# Patient Record
Sex: FEMALE | Race: BLACK OR AFRICAN AMERICAN | NOT HISPANIC OR LATINO | Employment: OTHER | URBAN - METROPOLITAN AREA
[De-identification: names, ages, dates, MRNs, and addresses within clinical notes are randomized per-mention and may not be internally consistent; named-entity substitution may affect disease eponyms.]

---

## 2023-04-05 ENCOUNTER — OFFICE VISIT (OUTPATIENT)
Dept: PRIMARY CARE | Facility: CLINIC | Age: 73
End: 2023-04-05
Payer: MEDICARE

## 2023-04-05 VITALS
OXYGEN SATURATION: 98 % | DIASTOLIC BLOOD PRESSURE: 75 MMHG | SYSTOLIC BLOOD PRESSURE: 126 MMHG | TEMPERATURE: 96.3 F | BODY MASS INDEX: 28.42 KG/M2 | HEART RATE: 84 BPM | WEIGHT: 150.4 LBS

## 2023-04-05 DIAGNOSIS — E11.9 TYPE 2 DIABETES MELLITUS WITHOUT COMPLICATION, WITHOUT LONG-TERM CURRENT USE OF INSULIN (MULTI): Primary | ICD-10-CM

## 2023-04-05 DIAGNOSIS — I10 BENIGN ESSENTIAL HYPERTENSION: ICD-10-CM

## 2023-04-05 DIAGNOSIS — I25.10 CAD S/P PERCUTANEOUS CORONARY ANGIOPLASTY: ICD-10-CM

## 2023-04-05 DIAGNOSIS — M25.562 ACUTE PAIN OF LEFT KNEE: ICD-10-CM

## 2023-04-05 DIAGNOSIS — Z98.61 CAD S/P PERCUTANEOUS CORONARY ANGIOPLASTY: ICD-10-CM

## 2023-04-05 PROBLEM — R49.0 DYSPHONIA: Status: ACTIVE | Noted: 2023-04-05

## 2023-04-05 PROBLEM — M81.0 POSTMENOPAUSAL BONE LOSS: Status: ACTIVE | Noted: 2023-04-05

## 2023-04-05 PROBLEM — R20.2 NUMBNESS AND TINGLING OF BOTH FEET: Status: ACTIVE | Noted: 2023-04-05

## 2023-04-05 PROBLEM — N95.2 POSTMENOPAUSAL ATROPHIC VAGINITIS: Status: ACTIVE | Noted: 2023-04-05

## 2023-04-05 PROBLEM — Z86.19 HEPATITIS C VIRUS INFECTION CURED AFTER ANTIVIRAL DRUG THERAPY: Status: ACTIVE | Noted: 2023-04-05

## 2023-04-05 PROBLEM — I73.00 RAYNAUDS PHENOMENON: Status: ACTIVE | Noted: 2023-04-05

## 2023-04-05 PROBLEM — H43.811 PVD (POSTERIOR VITREOUS DETACHMENT), RIGHT EYE: Status: ACTIVE | Noted: 2023-04-05

## 2023-04-05 PROBLEM — D68.61 ANTI-PHOSPHOLIPID ANTIBODY SYNDROME (MULTI): Status: ACTIVE | Noted: 2023-04-05

## 2023-04-05 PROBLEM — E78.5 HLD (HYPERLIPIDEMIA): Status: ACTIVE | Noted: 2023-04-05

## 2023-04-05 PROBLEM — M54.2 CERVICALGIA: Status: ACTIVE | Noted: 2023-04-05

## 2023-04-05 PROBLEM — H04.129 DRY EYE: Status: ACTIVE | Noted: 2023-04-05

## 2023-04-05 PROBLEM — J30.9 ALLERGIC RHINITIS: Status: ACTIVE | Noted: 2023-04-05

## 2023-04-05 PROBLEM — K76.9 CHRONIC LIVER DISEASE: Status: ACTIVE | Noted: 2023-04-05

## 2023-04-05 PROBLEM — I70.0 ATHEROSCLEROSIS OF AORTA (CMS-HCC): Status: ACTIVE | Noted: 2023-04-05

## 2023-04-05 PROBLEM — H25.10 NUCLEAR SCLEROSIS: Status: ACTIVE | Noted: 2023-04-05

## 2023-04-05 PROBLEM — K21.9 GERD (GASTROESOPHAGEAL REFLUX DISEASE): Status: ACTIVE | Noted: 2023-04-05

## 2023-04-05 PROBLEM — H52.4 PRESBYOPIA OF BOTH EYES: Status: ACTIVE | Noted: 2023-04-05

## 2023-04-05 PROBLEM — H25.812 COMBINED FORM OF AGE-RELATED CATARACT, LEFT EYE: Status: ACTIVE | Noted: 2023-04-05

## 2023-04-05 PROBLEM — H01.006 BLEPHARITIS OF BOTH EYES: Status: ACTIVE | Noted: 2023-04-05

## 2023-04-05 PROBLEM — H52.13 BILATERAL MYOPIA: Status: ACTIVE | Noted: 2023-04-05

## 2023-04-05 PROBLEM — R20.0 NUMBNESS AND TINGLING OF BOTH FEET: Status: ACTIVE | Noted: 2023-04-05

## 2023-04-05 PROBLEM — M05.79 RHEUMATOID ARTHRITIS INVOLVING MULTIPLE SITES WITH POSITIVE RHEUMATOID FACTOR (MULTI): Status: ACTIVE | Noted: 2023-04-05

## 2023-04-05 PROBLEM — K40.90 HERNIA, INGUINAL, RIGHT: Status: ACTIVE | Noted: 2023-04-05

## 2023-04-05 PROBLEM — M47.816 DEGENERATIVE ARTHRITIS OF LUMBAR SPINE: Status: ACTIVE | Noted: 2023-04-05

## 2023-04-05 PROBLEM — H01.003 BLEPHARITIS OF BOTH EYES: Status: ACTIVE | Noted: 2023-04-05

## 2023-04-05 PROBLEM — H53.30 BINOCULAR VISUAL DISTURBANCE: Status: ACTIVE | Noted: 2023-04-05

## 2023-04-05 PROBLEM — H40.1190 POAG (PRIMARY OPEN-ANGLE GLAUCOMA): Status: ACTIVE | Noted: 2023-04-05

## 2023-04-05 LAB — POC HEMOGLOBIN A1C: 6.6 % (ref 4.2–6.5)

## 2023-04-05 PROCEDURE — 1159F MED LIST DOCD IN RCRD: CPT | Performed by: NURSE PRACTITIONER

## 2023-04-05 PROCEDURE — 99214 OFFICE O/P EST MOD 30 MIN: CPT | Performed by: NURSE PRACTITIONER

## 2023-04-05 PROCEDURE — 1036F TOBACCO NON-USER: CPT | Performed by: NURSE PRACTITIONER

## 2023-04-05 PROCEDURE — 83036 HEMOGLOBIN GLYCOSYLATED A1C: CPT | Performed by: NURSE PRACTITIONER

## 2023-04-05 PROCEDURE — 3078F DIAST BP <80 MM HG: CPT | Performed by: NURSE PRACTITIONER

## 2023-04-05 PROCEDURE — 3074F SYST BP LT 130 MM HG: CPT | Performed by: NURSE PRACTITIONER

## 2023-04-05 RX ORDER — ASPIRIN 81 MG/1
81 TABLET ORAL DAILY
COMMUNITY

## 2023-04-05 RX ORDER — NITROGLYCERIN 0.4 MG/1
0.4 TABLET SUBLINGUAL EVERY 5 MIN PRN
COMMUNITY
Start: 2020-04-13

## 2023-04-05 RX ORDER — TRANDOLAPRIL 1 MG/1
1 TABLET ORAL DAILY
COMMUNITY
Start: 2023-02-08 | End: 2023-04-05 | Stop reason: ALTCHOICE

## 2023-04-05 RX ORDER — OMEPRAZOLE 20 MG/1
20 CAPSULE, DELAYED RELEASE ORAL
COMMUNITY
Start: 2023-03-16 | End: 2023-08-27

## 2023-04-05 RX ORDER — EZETIMIBE 10 MG/1
10 TABLET ORAL DAILY
COMMUNITY
Start: 2022-03-28 | End: 2023-10-13

## 2023-04-05 RX ORDER — METFORMIN HYDROCHLORIDE 500 MG/1
500 TABLET ORAL
COMMUNITY
Start: 2020-07-14 | End: 2023-04-24

## 2023-04-05 RX ORDER — BLOOD SUGAR DIAGNOSTIC
STRIP MISCELLANEOUS
COMMUNITY
Start: 2020-07-09 | End: 2023-07-05 | Stop reason: SDUPTHER

## 2023-04-05 RX ORDER — LATANOPROST 50 UG/ML
1 SOLUTION/ DROPS OPHTHALMIC NIGHTLY
COMMUNITY
Start: 2016-01-26 | End: 2023-10-30

## 2023-04-05 RX ORDER — QUINAPRIL 10 MG/1
10 TABLET ORAL DAILY
COMMUNITY
Start: 2020-06-30 | End: 2023-04-05 | Stop reason: ALTCHOICE

## 2023-04-05 RX ORDER — LIDOCAINE 50 MG/G
1 OINTMENT TOPICAL 3 TIMES DAILY
COMMUNITY
Start: 2019-10-11

## 2023-04-05 ASSESSMENT — ENCOUNTER SYMPTOMS
LOSS OF SENSATION IN FEET: 0
DEPRESSION: 0
OCCASIONAL FEELINGS OF UNSTEADINESS: 0

## 2023-04-05 NOTE — PROGRESS NOTES
Subjective   Patient ID: Taylor Richard is a 72 y.o. female who presents for a1c (fuv).    HPI     Reports left knee pain that has been has present for 2 weeks. Denies any injury or trauma. The pain only hurts with certain movements, such as bending down or getting into bed. Rates the pain 9/10. Has been applying a topical pain cream with relief.     T2DM  Last HgA1c: 6% on 10/25/22  Current meds: metformin 500 mg BID   Home glucose monitoring: every day, running 130s  Diet: fair but getting better, seeing a dietician   Exercise: going on walks daily  Statin: none, pravastatin caused chest pain - on Zetia  ACEI: none  ASA: yes, 81 mg QD  Last urine albumin: 1/4/23  Diabetic foot exam: declined   Vision exam: 2/6/23  Dental cleaning: Wears dentures   Pneumovax: Declined    Denies polyuria, polydipsia, vision changes, yeast infections, neuropathy, or hypoglycemic episodes.     HTN/CAD  Current meds: Zetia 10 mg every day, nitroglycerin prn  Home blood pressure monitoring: every day, running 120s/70s  Salt intake: limits   Caffeine intake: occasional tea  Diet: as noted above  Exercise: as noted above    Alcohol use: 1 pint of wine every other day  Tobacco use: none  Illicit drug use: none    Denies vision changes, headaches, dizziness, chest pain, palpitations, or edema.      Review of Systems  ROS negative except as noted above in HPI.       Objective   /75   Pulse 84   Temp 35.7 °C (96.3 °F)   Wt 68.2 kg (150 lb 6.4 oz)   SpO2 98%   BMI 28.42 kg/m²     Physical Exam  General: Alert and oriented, in no acute distress. Appears stated age, well-nourished, and well hydrated  HEENT:  - Head: Normocephalic and atraumatic   - Eyes: EOMI, PERRLA  - ENT: Hearing grossly intact  Heart: RRR. No murmurs, clicks, or rubs  Lungs: Unlabored breathing. CTAB with no crackles, wheezes, or rhonchi  Abdomen: Normal BS in all 4 quadrants. Soft, non-tender, non-distended, with no masses  Extremities: Warm and well perfused.  No edema. Normal peripheral pulses  Musculoskeletal: No TTP or pain with flexion and extension of L knee. Normal gait and station  Neurological: Alert and oriented. No gross neurological deficits  Psychological: Appropriate mood and affect  Skin: No rash, abnormal lesions, cyanosis, or erythema      Assessment/Plan     T2DM  - IO HgA1c: 6.6%  - Continue metformin 500 mg BID  - Continue to check glucose at home every day  - Urine albumin up to date  - PPSV23 up to date  - Monofilament exam  - Encouraged yearly retinal eye exams and twice yearly dental cleanings  - Counseled on lifestyle management    HTN/CAD  - BP well controlled, states quinapril was stopped by cardiologist  - Continue ASA 81 mg every day, Zetia 10 mg every day, and nitroglycerin prn  - Continue to check BP at home every day  - Counseled on lifestyle management    Acute left knee pain  - Continue topical pain cream  - Recommend ice application, wrapping  - Consider x-ray if no improvement    RTC in 4 months for Medicare Wellness Exam or sooner prn    Reviewed and approved by ELSY WASHINGTON on 4/5/23 at 11:46 AM.

## 2023-04-11 ENCOUNTER — TELEPHONE (OUTPATIENT)
Dept: PRIMARY CARE | Facility: CLINIC | Age: 73
End: 2023-04-11
Payer: MEDICARE

## 2023-04-11 DIAGNOSIS — E11.9 TYPE 2 DIABETES MELLITUS WITHOUT COMPLICATION, WITHOUT LONG-TERM CURRENT USE OF INSULIN (MULTI): Primary | ICD-10-CM

## 2023-04-11 NOTE — TELEPHONE ENCOUNTER
Pt called stating since her last appt with you, you mentioned changing her blood sugar medication.   Pt reported: 181 - 4/10 (morning)  221 - 4/10 (afternoon)  199 - 4/11 (morning)    Pt also stated she also now has had a headache for two days. She wants to know what should she do? She requested a call back . She can be reached at [phone]

## 2023-04-14 NOTE — TELEPHONE ENCOUNTER
Patient called yarelis is not covered under her insurance, she would for you to prescribe something that is.  She is being charged 47.00 for a 30 day day supply

## 2023-04-19 NOTE — PROGRESS NOTES
Rybelsus was $47/month so unable to afford. Blood sugars have been runnin  156  156  131  128  144  Will continue on metformin 500 mg BID only. Patient will keep me updated if blood sugars start to run higher.

## 2023-04-22 DIAGNOSIS — E11.9 TYPE 2 DIABETES MELLITUS WITHOUT COMPLICATION, WITHOUT LONG-TERM CURRENT USE OF INSULIN (MULTI): Primary | ICD-10-CM

## 2023-04-24 RX ORDER — METFORMIN HYDROCHLORIDE 500 MG/1
TABLET ORAL
Qty: 180 TABLET | Refills: 3 | Status: SHIPPED | OUTPATIENT
Start: 2023-04-24 | End: 2023-08-03 | Stop reason: SDUPTHER

## 2023-05-26 ENCOUNTER — OFFICE VISIT (OUTPATIENT)
Dept: PRIMARY CARE | Facility: CLINIC | Age: 73
End: 2023-05-26
Payer: MEDICARE

## 2023-05-26 VITALS
HEART RATE: 87 BPM | SYSTOLIC BLOOD PRESSURE: 139 MMHG | OXYGEN SATURATION: 99 % | DIASTOLIC BLOOD PRESSURE: 85 MMHG | BODY MASS INDEX: 28.89 KG/M2 | HEIGHT: 61 IN | WEIGHT: 153 LBS

## 2023-05-26 DIAGNOSIS — E78.5 HYPERLIPIDEMIA, UNSPECIFIED HYPERLIPIDEMIA TYPE: ICD-10-CM

## 2023-05-26 DIAGNOSIS — R39.9 UTI SYMPTOMS: ICD-10-CM

## 2023-05-26 DIAGNOSIS — Z12.31 ENCOUNTER FOR SCREENING MAMMOGRAM FOR BREAST CANCER: ICD-10-CM

## 2023-05-26 DIAGNOSIS — Z00.00 MEDICARE ANNUAL WELLNESS VISIT, SUBSEQUENT: Primary | ICD-10-CM

## 2023-05-26 DIAGNOSIS — Z00.00 ROUTINE GENERAL MEDICAL EXAMINATION AT HEALTH CARE FACILITY: ICD-10-CM

## 2023-05-26 LAB
POC APPEARANCE, URINE: CLEAR
POC BILIRUBIN, URINE: NEGATIVE
POC BLOOD, URINE: ABNORMAL
POC COLOR, URINE: YELLOW
POC GLUCOSE, URINE: NEGATIVE MG/DL
POC KETONES, URINE: NEGATIVE MG/DL
POC LEUKOCYTES, URINE: NEGATIVE
POC NITRITE,URINE: NEGATIVE
POC PH, URINE: 6 PH
POC PROTEIN, URINE: ABNORMAL MG/DL
POC SPECIFIC GRAVITY, URINE: >=1.03
POC UROBILINOGEN, URINE: 0.2 EU/DL

## 2023-05-26 PROCEDURE — G0439 PPPS, SUBSEQ VISIT: HCPCS | Performed by: NURSE PRACTITIONER

## 2023-05-26 PROCEDURE — 99212 OFFICE O/P EST SF 10 MIN: CPT | Performed by: NURSE PRACTITIONER

## 2023-05-26 PROCEDURE — 81003 URINALYSIS AUTO W/O SCOPE: CPT | Performed by: NURSE PRACTITIONER

## 2023-05-26 PROCEDURE — 1170F FXNL STATUS ASSESSED: CPT | Performed by: NURSE PRACTITIONER

## 2023-05-26 PROCEDURE — 3075F SYST BP GE 130 - 139MM HG: CPT | Performed by: NURSE PRACTITIONER

## 2023-05-26 PROCEDURE — 1036F TOBACCO NON-USER: CPT | Performed by: NURSE PRACTITIONER

## 2023-05-26 PROCEDURE — 1160F RVW MEDS BY RX/DR IN RCRD: CPT | Performed by: NURSE PRACTITIONER

## 2023-05-26 PROCEDURE — 3079F DIAST BP 80-89 MM HG: CPT | Performed by: NURSE PRACTITIONER

## 2023-05-26 PROCEDURE — 1159F MED LIST DOCD IN RCRD: CPT | Performed by: NURSE PRACTITIONER

## 2023-05-26 ASSESSMENT — ACTIVITIES OF DAILY LIVING (ADL)
DOING_HOUSEWORK: INDEPENDENT
DRESSING: INDEPENDENT
BATHING: INDEPENDENT
TAKING_MEDICATION: INDEPENDENT
GROCERY_SHOPPING: INDEPENDENT
MANAGING_FINANCES: INDEPENDENT

## 2023-05-26 ASSESSMENT — PATIENT HEALTH QUESTIONNAIRE - PHQ9
2. FEELING DOWN, DEPRESSED OR HOPELESS: NOT AT ALL
1. LITTLE INTEREST OR PLEASURE IN DOING THINGS: SEVERAL DAYS
10. IF YOU CHECKED OFF ANY PROBLEMS, HOW DIFFICULT HAVE THESE PROBLEMS MADE IT FOR YOU TO DO YOUR WORK, TAKE CARE OF THINGS AT HOME, OR GET ALONG WITH OTHER PEOPLE: NOT DIFFICULT AT ALL
1. LITTLE INTEREST OR PLEASURE IN DOING THINGS: SEVERAL DAYS
SUM OF ALL RESPONSES TO PHQ9 QUESTIONS 1 AND 2: 1
SUM OF ALL RESPONSES TO PHQ9 QUESTIONS 1 AND 2: 1
10. IF YOU CHECKED OFF ANY PROBLEMS, HOW DIFFICULT HAVE THESE PROBLEMS MADE IT FOR YOU TO DO YOUR WORK, TAKE CARE OF THINGS AT HOME, OR GET ALONG WITH OTHER PEOPLE: NOT DIFFICULT AT ALL
2. FEELING DOWN, DEPRESSED OR HOPELESS: NOT AT ALL

## 2023-05-26 ASSESSMENT — ENCOUNTER SYMPTOMS
LOSS OF SENSATION IN FEET: 0
DEPRESSION: 0
OCCASIONAL FEELINGS OF UNSTEADINESS: 0

## 2023-05-26 NOTE — PATIENT INSTRUCTIONS

## 2023-05-26 NOTE — PROGRESS NOTES
"Subjective   Reason for Visit: Taylor Richard is an 72 y.o. female here for a Medicare Wellness visit.     She reports urinary urgency and a slight odor with urinating for weeks. Has noticed that she is voiding more. Denies fevers, chills, body aches, pelvic pain, dysuria, hematuria, or incomplete bladder emptying.          Reviewed all medications by prescribing practitioner or clinical pharmacist (such as prescriptions, OTCs, herbal therapies and supplements) and documented in the medical record.    HPI    Patient Care Team:  MIGUEL ANGEL Rosales as PCP - General  MIGUEL ANGEL Rosales as PCP - [employer] PCP  Aung Blandon MD as Consulting Physician (Cardiology)  Deejay Terrell DPM as Consulting Physician (Podiatry)  Zehra Goldstein MD as Surgeon (Ophthalmology)     Review of Systems  ROS negative except as noted above in HPI.       Objective   Vitals:  /85   Pulse 87   Ht 1.549 m (5' 1\")   Wt 69.4 kg (153 lb)   SpO2 99%   BMI 28.91 kg/m²       Physical Exam  General: Alert and oriented, in no acute distress. Appears stated age, well-nourished, and well hydrated  HEENT:  - Head: Normocephalic and atraumatic   - Eyes: EOMI, PERRLA  - ENT: Hearing grossly intact  Heart: RRR. No murmurs, clicks, or rubs  Lungs: Unlabored breathing. CTAB with no crackles, wheezes, or rhonchi  Abdomen: Normal BS in all 4 quadrants. Soft, non-tender, non-distended, with no masses  Extremities: Warm and well perfused. No edema. Normal peripheral pulses  Musculoskeletal: Normal gait and station  Neurological: Alert and oriented. No gross neurological deficits  Psychological: Appropriate mood and affect  Skin: No rash, abnormal lesions, cyanosis, or erythema      Assessment/Plan   UTI symptoms  - IO UA not concerning for UTI    HLD  - Check lipid panel, BMP    Medicare Wellness Exam  - Mammogram ordered  - Up to date on colonoscopy  - Up to date on DEXA scan  - Due for Shingrix vaccine, advised to " go to pharmacy for this  - Up to date on other vaccines    RTC in 3 months or sooner prn    Reviewed and approved by ELSY WASHINGTON on 5/26/23 at 4:34 PM.            Patient was identified as a fall risk. Risk prevention instructions provided.

## 2023-05-30 ENCOUNTER — TELEPHONE (OUTPATIENT)
Dept: PRIMARY CARE | Facility: CLINIC | Age: 73
End: 2023-05-30

## 2023-05-30 ENCOUNTER — LAB (OUTPATIENT)
Dept: LAB | Facility: LAB | Age: 73
End: 2023-05-30
Payer: MEDICARE

## 2023-05-30 DIAGNOSIS — E78.5 HYPERLIPIDEMIA, UNSPECIFIED HYPERLIPIDEMIA TYPE: ICD-10-CM

## 2023-05-30 LAB
ANION GAP IN SER/PLAS: 14 MMOL/L (ref 10–20)
ANION GAP IN SER/PLAS: NORMAL
CALCIUM (MG/DL) IN SER/PLAS: 9.7 MG/DL (ref 8.6–10.3)
CALCIUM (MG/DL) IN SER/PLAS: NORMAL
CARBON DIOXIDE, TOTAL (MMOL/L) IN SER/PLAS: 27 MMOL/L (ref 21–32)
CARBON DIOXIDE, TOTAL (MMOL/L) IN SER/PLAS: NORMAL
CHLORIDE (MMOL/L) IN SER/PLAS: 103 MMOL/L (ref 98–107)
CHLORIDE (MMOL/L) IN SER/PLAS: NORMAL
CHOLESTEROL (MG/DL) IN SER/PLAS: 153 MG/DL (ref 0–199)
CHOLESTEROL (MG/DL) IN SER/PLAS: NORMAL
CHOLESTEROL IN HDL (MG/DL) IN SER/PLAS: 44.2 MG/DL
CHOLESTEROL IN HDL (MG/DL) IN SER/PLAS: NORMAL
CHOLESTEROL/HDL RATIO: 3.5
CHOLESTEROL/HDL RATIO: NORMAL
CREATININE (MG/DL) IN SER/PLAS: 0.77 MG/DL (ref 0.5–1.05)
CREATININE (MG/DL) IN SER/PLAS: NORMAL
GFR FEMALE: 82 ML/MIN/1.73M2
GFR FEMALE: NORMAL
GFR MALE: NORMAL
GLUCOSE (MG/DL) IN SER/PLAS: 122 MG/DL (ref 74–99)
GLUCOSE (MG/DL) IN SER/PLAS: NORMAL
LDL: 86 MG/DL (ref 0–99)
LDL: NORMAL
NON HDL CHOLESTEROL: NORMAL
POTASSIUM (MMOL/L) IN SER/PLAS: 4.4 MMOL/L (ref 3.5–5.3)
POTASSIUM (MMOL/L) IN SER/PLAS: NORMAL
SODIUM (MMOL/L) IN SER/PLAS: 140 MMOL/L (ref 136–145)
SODIUM (MMOL/L) IN SER/PLAS: NORMAL
TRIGLYCERIDE (MG/DL) IN SER/PLAS: 115 MG/DL (ref 0–149)
TRIGLYCERIDE (MG/DL) IN SER/PLAS: NORMAL
UREA NITROGEN (MG/DL) IN SER/PLAS: 12 MG/DL (ref 6–23)
UREA NITROGEN (MG/DL) IN SER/PLAS: NORMAL
VLDL: 23 MG/DL (ref 0–40)
VLDL: NORMAL

## 2023-07-05 DIAGNOSIS — E11.9 TYPE 2 DIABETES MELLITUS WITHOUT COMPLICATION, WITHOUT LONG-TERM CURRENT USE OF INSULIN (MULTI): Primary | ICD-10-CM

## 2023-07-05 RX ORDER — BLOOD SUGAR DIAGNOSTIC
STRIP MISCELLANEOUS
Qty: 200 STRIP | Refills: 3 | Status: SHIPPED | OUTPATIENT
Start: 2023-07-05 | End: 2023-11-20

## 2023-07-27 ENCOUNTER — TELEPHONE (OUTPATIENT)
Dept: PRIMARY CARE | Facility: CLINIC | Age: 73
End: 2023-07-27
Payer: MEDICARE

## 2023-07-28 PROBLEM — L65.9 HAIR THINNING: Status: ACTIVE | Noted: 2023-07-28

## 2023-07-28 PROBLEM — R07.9 CHEST PAIN: Status: ACTIVE | Noted: 2023-07-28

## 2023-07-28 PROBLEM — M75.01 ADHESIVE CAPSULITIS OF RIGHT SHOULDER: Status: ACTIVE | Noted: 2023-07-28

## 2023-07-28 PROBLEM — M25.551 RIGHT HIP PAIN: Status: ACTIVE | Noted: 2023-07-28

## 2023-07-28 PROBLEM — R20.9 SENSATION OF COLD IN LOWER EXTREMITY: Status: ACTIVE | Noted: 2023-07-28

## 2023-07-28 PROBLEM — H69.90 EUSTACHIAN TUBE DYSFUNCTION: Status: ACTIVE | Noted: 2023-07-28

## 2023-07-28 PROBLEM — M62.830 SPASM OF BACK MUSCLES: Status: ACTIVE | Noted: 2023-07-28

## 2023-07-28 PROBLEM — M25.511 ACUTE PAIN OF RIGHT SHOULDER: Status: ACTIVE | Noted: 2023-07-28

## 2023-07-28 PROBLEM — M79.672 LEFT FOOT PAIN: Status: ACTIVE | Noted: 2023-07-28

## 2023-07-28 PROBLEM — R79.89 ELEVATED SERUM CREATININE: Status: ACTIVE | Noted: 2023-07-28

## 2023-07-28 PROBLEM — D64.9 ANEMIA: Status: ACTIVE | Noted: 2023-07-28

## 2023-07-28 PROBLEM — E11.9 DIABETES MELLITUS (MULTI): Status: ACTIVE | Noted: 2023-07-28

## 2023-07-28 PROBLEM — M19.90 OSTEOARTHRITIS: Status: ACTIVE | Noted: 2023-07-28

## 2023-07-28 PROBLEM — K80.20 CHOLELITHIASIS: Status: ACTIVE | Noted: 2023-07-28

## 2023-07-28 PROBLEM — M54.50 ACUTE MIDLINE LOW BACK PAIN: Status: ACTIVE | Noted: 2023-07-28

## 2023-07-28 PROBLEM — R09.A2 GLOBUS SENSATION: Status: ACTIVE | Noted: 2023-07-28

## 2023-07-28 PROBLEM — H90.3 HEARING LOSS SENSORY, BILATERAL: Status: ACTIVE | Noted: 2023-07-28

## 2023-08-03 ENCOUNTER — OFFICE VISIT (OUTPATIENT)
Dept: PRIMARY CARE | Facility: CLINIC | Age: 73
End: 2023-08-03
Payer: MEDICARE

## 2023-08-03 ENCOUNTER — APPOINTMENT (OUTPATIENT)
Dept: PRIMARY CARE | Facility: CLINIC | Age: 73
End: 2023-08-03
Payer: MEDICARE

## 2023-08-03 VITALS
WEIGHT: 153.8 LBS | DIASTOLIC BLOOD PRESSURE: 72 MMHG | RESPIRATION RATE: 16 BRPM | OXYGEN SATURATION: 94 % | BODY MASS INDEX: 29.04 KG/M2 | HEART RATE: 75 BPM | SYSTOLIC BLOOD PRESSURE: 146 MMHG | HEIGHT: 61 IN

## 2023-08-03 DIAGNOSIS — R31.21 ASYMPTOMATIC MICROSCOPIC HEMATURIA: ICD-10-CM

## 2023-08-03 DIAGNOSIS — I25.10 CAD S/P PERCUTANEOUS CORONARY ANGIOPLASTY: ICD-10-CM

## 2023-08-03 DIAGNOSIS — Z98.61 CAD S/P PERCUTANEOUS CORONARY ANGIOPLASTY: ICD-10-CM

## 2023-08-03 DIAGNOSIS — I10 BENIGN ESSENTIAL HYPERTENSION: ICD-10-CM

## 2023-08-03 DIAGNOSIS — E11.9 TYPE 2 DIABETES MELLITUS WITHOUT COMPLICATION, WITHOUT LONG-TERM CURRENT USE OF INSULIN (MULTI): Primary | ICD-10-CM

## 2023-08-03 DIAGNOSIS — B37.31 YEAST VAGINITIS: ICD-10-CM

## 2023-08-03 LAB
POC APPEARANCE, URINE: CLEAR
POC BILIRUBIN, URINE: NEGATIVE
POC BLOOD, URINE: ABNORMAL
POC COLOR, URINE: YELLOW
POC GLUCOSE, URINE: NEGATIVE MG/DL
POC HEMOGLOBIN A1C: 6.9 % (ref 4.2–6.5)
POC KETONES, URINE: NEGATIVE MG/DL
POC LEUKOCYTES, URINE: ABNORMAL
POC NITRITE,URINE: NEGATIVE
POC PH, URINE: 6 PH
POC PROTEIN, URINE: ABNORMAL MG/DL
POC SPECIFIC GRAVITY, URINE: >=1.03
POC UROBILINOGEN, URINE: 0.2 EU/DL

## 2023-08-03 PROCEDURE — 83036 HEMOGLOBIN GLYCOSYLATED A1C: CPT | Performed by: NURSE PRACTITIONER

## 2023-08-03 PROCEDURE — 1126F AMNT PAIN NOTED NONE PRSNT: CPT | Performed by: NURSE PRACTITIONER

## 2023-08-03 PROCEDURE — 4010F ACE/ARB THERAPY RXD/TAKEN: CPT | Performed by: NURSE PRACTITIONER

## 2023-08-03 PROCEDURE — 1036F TOBACCO NON-USER: CPT | Performed by: NURSE PRACTITIONER

## 2023-08-03 PROCEDURE — 1160F RVW MEDS BY RX/DR IN RCRD: CPT | Performed by: NURSE PRACTITIONER

## 2023-08-03 PROCEDURE — 1159F MED LIST DOCD IN RCRD: CPT | Performed by: NURSE PRACTITIONER

## 2023-08-03 PROCEDURE — 3078F DIAST BP <80 MM HG: CPT | Performed by: NURSE PRACTITIONER

## 2023-08-03 PROCEDURE — 87086 URINE CULTURE/COLONY COUNT: CPT

## 2023-08-03 PROCEDURE — 3077F SYST BP >= 140 MM HG: CPT | Performed by: NURSE PRACTITIONER

## 2023-08-03 PROCEDURE — 99214 OFFICE O/P EST MOD 30 MIN: CPT | Performed by: NURSE PRACTITIONER

## 2023-08-03 PROCEDURE — 81003 URINALYSIS AUTO W/O SCOPE: CPT | Performed by: NURSE PRACTITIONER

## 2023-08-03 RX ORDER — METFORMIN HYDROCHLORIDE 500 MG/1
TABLET ORAL
Qty: 270 TABLET | Refills: 3 | Status: SHIPPED | OUTPATIENT
Start: 2023-08-03 | End: 2023-09-06 | Stop reason: SDUPTHER

## 2023-08-03 RX ORDER — QUINAPRIL 10 MG/1
10 TABLET ORAL NIGHTLY
Qty: 30 TABLET | Refills: 0 | Status: SHIPPED | OUTPATIENT
Start: 2023-08-03 | End: 2023-08-08 | Stop reason: WASHOUT

## 2023-08-03 RX ORDER — FLUCONAZOLE 150 MG/1
TABLET ORAL
Qty: 2 TABLET | Refills: 0 | Status: SHIPPED | OUTPATIENT
Start: 2023-08-03 | End: 2023-08-24 | Stop reason: ALTCHOICE

## 2023-08-03 NOTE — PROGRESS NOTES
"Subjective   Patient ID: Taylor Richard is a 72 y.o. female who presents for chronic care.    HPI     T2DM  Last HgA1c: 6.6% on 4/5/23  Current meds: metformin 500 mg BID   Home glucose monitoring: every day, 150s-180s; running up to 200s after eating   Diet: fair but getting better, seeing a dietician - next appt later this month   Exercise: going on walks daily  Statin: none, pravastatin caused chest pain - on Zetia  ACEI: none  ASA: yes, 81 mg QD  Last urine albumin: 1/4/23  Diabetic foot exam: declined   Vision exam: 2/6/23  Dental cleaning: Wears dentures   Pneumovax: Declined     Reports vaginal itching. Denies polyuria, polydipsia, vision changes, neuropathy, or hypoglycemic episodes.      HTN/CAD  Current meds: Zetia 10 mg every day, nitroglycerin prn  Home blood pressure monitoring: every day, running 130s/?  Salt intake: limits   Caffeine intake: occasional tea  Diet: as noted above  Exercise: as noted above     Alcohol use: 1 pint of wine every other day  Tobacco use: none  Illicit drug use: none     Denies vision changes, headaches, dizziness, chest pain, palpitations, or edema.    Her last UA showed trace blood. Denies any current UTI symptoms.     Review of Systems  ROS negative except as noted above in HPI.       Objective   /72   Pulse 75   Resp 16   Ht 1.549 m (5' 1\")   Wt 69.8 kg (153 lb 12.8 oz)   SpO2 94%   BMI 29.06 kg/m²     Physical Exam  General: Alert and oriented, in no acute distress. Appears stated age, well-nourished, and well hydrated  HEENT:  - Head: Normocephalic and atraumatic   - Eyes: EOMI, PERRLA  - ENT: Hearing grossly intact  Heart: RRR. No murmurs, clicks, or rubs  Lungs: Unlabored breathing. CTAB with no crackles, wheezes, or rhonchi  Abdomen: Normal BS in all 4 quadrants. Soft, non-tender, non-distended, with no masses  Extremities: Warm and well perfused. No edema. Normal peripheral pulses  Musculoskeletal: Normal gait and station  Neurological: Alert and " oriented. No gross neurological deficits  Psychological: Appropriate mood and affect  Skin: No rash, abnormal lesions, cyanosis, or erythema      Assessment/Plan   T2DM  - IO HgA1c: 6.9%  - Increase metformin to 1000 mg QAM and 500 mg QPM  - Counseled on diet, follow up with dietician  - Continue to check glucose at home every day  - Urine albumin up to date  - PPSV23 up to date  - Monofilament exam declined  - Encouraged yearly retinal eye exams and twice yearly dental cleanings  - Counseled on lifestyle management     HTN/CAD  - BP uncontrolled, no longer on quinapril but will restart at 10 mg every day   - Continue ASA 81 mg every day, Zetia 10 mg every day, and nitroglycerin prn  - Continue to check BP at home every day  - Counseled on lifestyle management    Yeast vaginitis  - RX fluconazole 150 mg once, repeat in 3 days if no better    Hematuria  - IO UA + trace blood, leuks  - Will check culture  - If culture WNL, will refer to nephrology    RTC in 1 month for hypertension or sooner prn    GISELA Crane-CNP  [facility]

## 2023-08-03 NOTE — PATIENT INSTRUCTIONS
"Diabetes and diet    The Basics  Written by the doctors and editors at [facility]  Why is diet important if I have diabetes? -- Diet is important because it is part of diabetes treatment. Many people need to change what they eat and how much they eat to help treat their diabetes. It is important for people to treat their diabetes so that they:  ?Keep their blood sugar at or near a normal level  ?Prevent long-term problems, such as heart or kidney problems, that can happen in people with diabetes  Changing your diet can also help treat obesity, high blood pressure, and high cholesterol. These conditions can affect people with diabetes and can lead to future problems, such as heart attacks or strokes.  Who will help me change my diet? -- Your doctor or nurse will work with you to make a food plan to change your diet. They might also recommend that you work with a \"dietitian.\" A dietitian is an expert on food and eating.  Do I need to eat at the same times every day? -- When and how often you should eat depends, in part, on the diabetes medicines you take. For example:  ?People who take about the same amount of insulin at the same time each day (called a \"fixed regimen\") should eat meals at the same times. This is also true for people who take pills that increase insulin levels, such as sulfonylureas. Eating meals at the same time every day helps prevent low blood sugar.  ?People who adjust the dose and timing of their insulin each day (called a \"flexible regimen\") do not always have to eat meals at the same time. That's because they can time their insulin dose for before they plan to eat, and also adjust the dose for how much they plan to eat.  ?People who take medicines that don't usually cause low blood sugar, such as metformin, don't have to eat meals at the same time every day  What do I need to think about when planning what to eat? -- Our bodies break down the food we eat into small pieces called carbohydrates, " "proteins, and fats.  When planning what to eat, people with diabetes need to think about:  ?Carbohydrates (or \"carbs\") - Carbohydrates are sugars that our bodies use for energy. They can raise a person's blood sugar level. Your doctor, nurse, or dietitian will tell you how many carbohydrates you should eat at each meal or snack. Foods that have carbohydrates include:  Bread, pasta, and rice  Vegetables and fruits  Dairy foods  Foods and drinks with added sugar  It is best to get your carbohydrates from fruits, vegetables, whole grains, and low-fat milk. It is best to avoid drinks with added sugar, like soda, juices, and sports drinks.  ?Protein - Your doctor, nurse, or dietitian will tell you how much protein you should eat each day. It is best to eat lean meats, fish, eggs, beans, peas, soy products, nuts, and seeds. Avoid or limit processed meats like brown, hot dogs, and sausages.  ?Fats - The type of fat you eat is more important than the amount of fat. \"Saturated\" and \"trans\" fats can increase your risk for heart problems, like a heart attack.  Foods that have saturated fats include meat, butter, cheese, and ice cream  Foods that have trans fats include processed food with \"partially hydrogenated oils\" on the ingredient list. This might include fried foods, store-bought cookies, muffins, pies, and cakes.  \"Monounsaturated\" and \"polyunsaturated\" fats are better for you. Foods with these types of fat include fish, avocado, olive oil, and nuts.  ?Calories - You need to eat a certain amount of calories each day to keep your weight the same. If you are overweight and want to lose weight, you need to eat fewer calories each day.  ?Fiber - Eating foods with a lot of fiber can help control your blood sugar level. Foods that have a lot of fiber include apples, green beans, peas, beans, lentils, nuts, oatmeal, and whole grains.  ?Salt - People who have high blood pressure should not eat foods that contain a lot of salt " (also called sodium). People with high blood pressure should also eat healthy foods, such as fruits, vegetables, and low-fat dairy foods.  ?Alcohol - Having more than 1 drink (for females) or 2 drinks (for males) a day can raise blood sugar levels. Also, drinks that have fruit juice or soda in them can raise blood sugar levels.  What can I do if I need to lose weight? -- If you need to lose weight, you can:  ?Exercise - Try to get at least 30 minutes of physical activity a day, most days of the week. Even gentle exercise, like walking, is good for your health. Some people with diabetes need to change their medicine dose before they exercise. They might also need to check their blood sugar levels before and after exercising.  ?Eat fewer calories - Your doctor, nurse, or dietitian can tell you how many calories you should eat each day in order to lose weight.  If you are worried about your weight, size, or shape, talk with your doctor, nurse, or dietitian. They can help you make changes to improve your health.  Can I eat the same foods as my family? -- Yes. You do not need to eat special foods if you have diabetes. You and your family can eat the same foods. Changing your diet is mostly about eating healthy foods and not eating too much.  What are the other parts of diabetes treatment? -- Besides changing your diet, the other parts of diabetes treatment are:  ?Exercise  ?Medicines  Some people with diabetes need to learn how to match their diet and exercise with their medicine dose. For example, people who use insulin might need to choose the dose of insulin they give themselves. To choose their dose, they need to think about:  ?What they plan to eat at the next meal  ?How much exercise they plan to do  ?What their blood sugar level is  If the diet and exercise do not match the medicine dose, a person's blood sugar level can get too low or too high. Blood sugar levels that are too low or too high can cause  problems.  All topics are updated as new evidence becomes available and our peer review process is complete.  This topic retrieved from [employer] on: Jul 25, 2023.  Topic 04115 Version 10.0  Release: 31.3.4 - C31.205  © 2023 [employer] and/or its affiliates. All rights reserved.

## 2023-08-04 LAB — URINE CULTURE: NORMAL

## 2023-08-07 DIAGNOSIS — B37.31 YEAST VAGINITIS: ICD-10-CM

## 2023-08-07 RX ORDER — FLUCONAZOLE 150 MG/1
TABLET ORAL
Qty: 2 TABLET | Refills: 0 | OUTPATIENT
Start: 2023-08-07

## 2023-08-08 DIAGNOSIS — R31.21 ASYMPTOMATIC MICROSCOPIC HEMATURIA: Primary | ICD-10-CM

## 2023-08-08 RX ORDER — LISINOPRIL 20 MG/1
20 TABLET ORAL DAILY
COMMUNITY
End: 2023-08-24 | Stop reason: SINTOL

## 2023-08-08 NOTE — RESULT ENCOUNTER NOTE
Please inform patient that her urine culture did not show a UTI. I placed a referral to a kidney specialist to try to determine why she is having a little bit of blood in her urine. She can call [phone] to schedule.

## 2023-08-18 NOTE — TELEPHONE ENCOUNTER
Spoke with patient     ----- Message from GISELA Rosales-CNP sent at 8/8/2023  1:40 PM EDT -----  Please inform patient that her urine culture did not show a UTI. I placed a referral to a kidney specialist to try to determine why she is having a little bit of blood in her urine. She can call [phone] to schedule.

## 2023-08-24 ENCOUNTER — OFFICE VISIT (OUTPATIENT)
Dept: PRIMARY CARE | Facility: CLINIC | Age: 73
End: 2023-08-24
Payer: MEDICARE

## 2023-08-24 VITALS
BODY MASS INDEX: 29.04 KG/M2 | WEIGHT: 153.8 LBS | HEART RATE: 69 BPM | DIASTOLIC BLOOD PRESSURE: 80 MMHG | OXYGEN SATURATION: 98 % | SYSTOLIC BLOOD PRESSURE: 133 MMHG | HEIGHT: 61 IN

## 2023-08-24 DIAGNOSIS — B37.31 YEAST VAGINITIS: ICD-10-CM

## 2023-08-24 DIAGNOSIS — E11.9 TYPE 2 DIABETES MELLITUS WITHOUT COMPLICATION, WITHOUT LONG-TERM CURRENT USE OF INSULIN (MULTI): ICD-10-CM

## 2023-08-24 DIAGNOSIS — I10 BENIGN ESSENTIAL HYPERTENSION: Primary | ICD-10-CM

## 2023-08-24 PROCEDURE — 3079F DIAST BP 80-89 MM HG: CPT | Performed by: NURSE PRACTITIONER

## 2023-08-24 PROCEDURE — 3075F SYST BP GE 130 - 139MM HG: CPT | Performed by: NURSE PRACTITIONER

## 2023-08-24 PROCEDURE — 1159F MED LIST DOCD IN RCRD: CPT | Performed by: NURSE PRACTITIONER

## 2023-08-24 PROCEDURE — 1036F TOBACCO NON-USER: CPT | Performed by: NURSE PRACTITIONER

## 2023-08-24 PROCEDURE — 1160F RVW MEDS BY RX/DR IN RCRD: CPT | Performed by: NURSE PRACTITIONER

## 2023-08-24 PROCEDURE — 1126F AMNT PAIN NOTED NONE PRSNT: CPT | Performed by: NURSE PRACTITIONER

## 2023-08-24 PROCEDURE — 99214 OFFICE O/P EST MOD 30 MIN: CPT | Performed by: NURSE PRACTITIONER

## 2023-08-24 RX ORDER — FLUCONAZOLE 150 MG/1
TABLET ORAL
Qty: 2 TABLET | Refills: 0 | Status: SHIPPED | OUTPATIENT
Start: 2023-08-24 | End: 2023-11-22 | Stop reason: ALTCHOICE

## 2023-08-24 NOTE — PROGRESS NOTES
"Subjective   Patient ID: Taylor Richard is a 72 y.o. female who presents for Hypertension (Pain in R shoulder from fall last year) and Blood Sugar Problem (Pt has concerns about metformin and Lisinopril. /She stated Lisinopril gave her a headache for 2 days and stopped taking it also due to the 24 side effects she's heard it can cause since she has heart problems. /Pt has a list of recent blood sugar levels.).    HPI     Took one dose of lisinopril and did not continue because it gave her a headache for 2 days, she developed a cough, and had chest pain. The chest pain resolved with nitroglycerin. She has been checking her blood pressure most days and it's been running in the 130s systolic.     Concerned that her blood sugar has been running high despite higher dose of metformin. Readings have been 130s-190s. Admits she does not know what she should be eating. She has been walking daily.     Still has a little vaginal itching after taking fluconazole for 2 doses. Requesting a refill.     Continues to have right shoulder pain. Does not want to do physical therapy due to the $40 copay each visit and not interested in a steroid injection.     Review of Systems  ROS negative except as noted above in HPI.       Objective   /80   Pulse 69   Ht 1.549 m (5' 1\")   Wt 69.8 kg (153 lb 12.8 oz)   SpO2 98%   BMI 29.06 kg/m²     Physical Exam  General: Alert and oriented, in no acute distress. Appears stated age, well-nourished, and well hydrated  HEENT:  - Head: Normocephalic and atraumatic   - Eyes: EOMI, PERRLA  - ENT: Hearing grossly intact  Heart: RRR. No murmurs, clicks, or rubs  Lungs: Unlabored breathing. CTAB with no crackles, wheezes, or rhonchi  Abdomen: Normal BS in all 4 quadrants. Soft, non-tender, non-distended, with no masses  Extremities: Warm and well perfused. No edema. Normal peripheral pulses  Musculoskeletal: TTP over R glenohumeral joint. Normal gait and station  Neurological: Alert and oriented. " No gross neurological deficits  Psychological: Appropriate mood and affect  Skin: No rash, abnormal lesions, cyanosis, or erythema      Assessment/Plan   Hypertension  - Well controlled without medication  - Continue to monitor at home  - Lifestyle management    T2DM  - Getting blood sugar readings 130s-190s despite increased metformin dose  - Discussed increasing metformin dose further or changing to a different medication; patient prefers to wait until after she sees the dietician on 8/28 to see if her sugars decrease    Yeast infection  - Rx fluconazole  - Will need pap if no better     R shoulder pain  - Declined cortisone injection by ortho or PT  - Tylenol prn    RTC in 3 months for chronic care or sooner prn    GISELA Crane-CNP  Formerly [facility]

## 2023-08-25 ENCOUNTER — TELEPHONE (OUTPATIENT)
Dept: PRIMARY CARE | Facility: CLINIC | Age: 73
End: 2023-08-25
Payer: MEDICARE

## 2023-09-06 DIAGNOSIS — E11.9 TYPE 2 DIABETES MELLITUS WITHOUT COMPLICATION, WITHOUT LONG-TERM CURRENT USE OF INSULIN (MULTI): ICD-10-CM

## 2023-09-06 RX ORDER — METFORMIN HYDROCHLORIDE 500 MG/1
TABLET ORAL
Qty: 270 TABLET | Refills: 3 | Status: SHIPPED | OUTPATIENT
Start: 2023-09-06

## 2023-10-09 ENCOUNTER — LAB (OUTPATIENT)
Dept: LAB | Facility: LAB | Age: 73
End: 2023-10-09
Payer: MEDICARE

## 2023-10-09 ENCOUNTER — OFFICE VISIT (OUTPATIENT)
Dept: NEPHROLOGY | Facility: CLINIC | Age: 73
End: 2023-10-09
Payer: MEDICARE

## 2023-10-09 VITALS
HEART RATE: 81 BPM | RESPIRATION RATE: 18 BRPM | DIASTOLIC BLOOD PRESSURE: 75 MMHG | TEMPERATURE: 97.6 F | WEIGHT: 156 LBS | BODY MASS INDEX: 29.45 KG/M2 | HEIGHT: 61 IN | SYSTOLIC BLOOD PRESSURE: 145 MMHG

## 2023-10-09 DIAGNOSIS — I10 BENIGN ESSENTIAL HYPERTENSION: ICD-10-CM

## 2023-10-09 DIAGNOSIS — I10 BENIGN ESSENTIAL HYPERTENSION: Primary | ICD-10-CM

## 2023-10-09 PROCEDURE — 3078F DIAST BP <80 MM HG: CPT | Performed by: INTERNAL MEDICINE

## 2023-10-09 PROCEDURE — 81001 URINALYSIS AUTO W/SCOPE: CPT

## 2023-10-09 PROCEDURE — 1036F TOBACCO NON-USER: CPT | Performed by: INTERNAL MEDICINE

## 2023-10-09 PROCEDURE — 3066F NEPHROPATHY DOC TX: CPT | Performed by: INTERNAL MEDICINE

## 2023-10-09 PROCEDURE — 3077F SYST BP >= 140 MM HG: CPT | Performed by: INTERNAL MEDICINE

## 2023-10-09 PROCEDURE — 1159F MED LIST DOCD IN RCRD: CPT | Performed by: INTERNAL MEDICINE

## 2023-10-09 PROCEDURE — 99203 OFFICE O/P NEW LOW 30 MIN: CPT | Performed by: INTERNAL MEDICINE

## 2023-10-09 PROCEDURE — 1160F RVW MEDS BY RX/DR IN RCRD: CPT | Performed by: INTERNAL MEDICINE

## 2023-10-09 PROCEDURE — 1126F AMNT PAIN NOTED NONE PRSNT: CPT | Performed by: INTERNAL MEDICINE

## 2023-10-09 NOTE — PROGRESS NOTES
Subjective   Patient ID: Taylor Richard is a 73 y.o. female who presents for New Patient Visit (Patient states that her primary doctor sent her because she had blood in her urine).  HPI  Was told that she has blood in the urine a few months ago, no h/o hematuria in childhood/youth  H/o DM since more than 10 years.No h/o retinopathy/neuropathy  No H/o htn   H/o PCI with stent in 2022, no h/o HFrEF  H/o Hep C in the past, treated with Harvoni, follows with hepatology  No h/o PAD/stroke/TIA  No h/o chronic diarrhea  No h/o kidney stones  No h/o regular NSAID use now was on meloxicam in the past, stopped many years  No h/o OTC supplements/ herbal medications use  No h/o gout  No h/o cancers  H/o RA not on any treatment  Denies h/o sickle cell disease  No h/o blood clots/spontaneous miscarriages  No h/o leg swelling/Franco/orthopnea/hematuria/cola colored/frothy urine  FH: No dialysis/kidney stones/kidney disease  Occupational history: retired   Smoking/alcohol/illicit drug use : quit 3 years ago, smoked for 40 years, 1-1.5 PPD, occasional alcohol use    Review of Systems  RoS negative for all other systems except as noted above.    Objective   Physical Exam  No distress  HEENT:  moist, no pallor  No edema cachorro LE  Breath sounds cachorro equal, clear  S1 S2 regular, normal, no rub or murmur  Abdomen soft, non tender, no CVA tenderness  AAO x3, non focal    Office Visit on 08/03/2023   Component Date Value Ref Range Status    POC HEMOGLOBIN A1c 08/03/2023 6.9 (A)  4.2 - 6.5 % Final    POC Color, Urine 08/03/2023 Yellow  Straw, Yellow, Light Yellow Final    POC Appearance, Urine 08/03/2023 Clear  Clear Final    POC Specific Gravity, Urine 08/03/2023 >=1.030  1.005 - 1.035 Final    POC PH, Urine 08/03/2023 6.0  No Reference Range Established PH Final    POC Protein, Urine 08/03/2023 30 (1+)  NEGATIVE, 30 (1+) mg/dl Final    POC Glucose, Urine 08/03/2023 NEGATIVE  NEGATIVE mg/dl Final    POC Blood, Urine 08/03/2023  TRACE-Lysed (A)  NEGATIVE Final    POC Ketones, Urine 08/03/2023 NEGATIVE (A)  NEGATIVE mg/dl Final    POC Bilirubin, Urine 08/03/2023 NEGATIVE  NEGATIVE Final    POC Urobilinogen, Urine 08/03/2023 0.2  0.2, 1.0 EU/DL Final    Poc Nitrate, Urine 08/03/2023 NEGATIVE  NEGATIVE Final    POC Leukocytes, Urine 08/03/2023 TRACE (A)  NEGATIVE Final    Urine Culture 08/03/2023 **Culture Comments - See Below   Final   Orders Only on 05/30/2023   Component Date Value Ref Range Status    Glucose 05/30/2023 CANCELED   Final-Edited    Result canceled by the ancillary.    Sodium 05/30/2023 CANCELED   Final-Edited    Result canceled by the ancillary.    Potassium 05/30/2023 CANCELED   Final-Edited    Result canceled by the ancillary.    Chloride 05/30/2023 CANCELED   Final-Edited    Result canceled by the ancillary.    Bicarbonate 05/30/2023 CANCELED   Final-Edited    Result canceled by the ancillary.    Anion Gap 05/30/2023 CANCELED   Final-Edited    Result canceled by the ancillary.    Urea Nitrogen 05/30/2023 CANCELED   Final-Edited    Result canceled by the ancillary.    Creatinine 05/30/2023 CANCELED   Final-Edited    Result canceled by the ancillary.    GFR Female 05/30/2023 CANCELED   Final-Edited    Comment:  CALCULATIONS OF ESTIMATED GFR ARE PERFORMED   USING THE 2021 CKD-EPI STUDY REFIT EQUATION   WITHOUT THE RACE VARIABLE FOR THE IDMS-TRACEABLE   CREATININE METHODS.    https://jasn.asnjournals.org/content/early/2021/09/22/ASN.[email or web address]    Result canceled by the ancillary.      GFR MALE 05/30/2023 CANCELED   Final-Edited    Comment:  CALCULATIONS OF ESTIMATED GFR ARE PERFORMED   USING THE 2021 CKD-EPI STUDY REFIT EQUATION   WITHOUT THE RACE VARIABLE FOR THE IDMS-TRACEABLE   CREATININE METHODS.    https://jasn.asnjournals.org/content/early/2021/09/22/[email or web address]    Result canceled by the ancillary.      Calcium 05/30/2023 CANCELED   Final-Edited    Result canceled by the ancillary.    Cholesterol 05/30/2023 CANCELED    Final-Edited    Comment: .      AGE      DESIRABLE   BORDERLINE HIGH   HIGH     0-19 Y     0 - 169       170 - 199     >/= 200    20-24 Y     0 - 189       190 - 224     >/= 225         >24 Y     0 - 199       200 - 239     >/= 240   **All ranges are based on fasting samples. Specific   therapeutic targets will vary based on patient-specific   cardiac risk.  .   Pediatric guidelines reference:Pediatrics 2011, 128(S5).   Adult guidelines reference: NCEP ATPIII Guidelines,     KHLOE 2001, 258:2486-97  .   Venipuncture immediately after or during the    administration of Metamizole may lead to falsely   low results. Testing should be performed immediately   prior to Metamizole dosing.    Result canceled by the ancillary.      HDL 05/30/2023 CANCELED   Final-Edited    Comment: .      AGE      VERY LOW   LOW     NORMAL    HIGH       0-19 Y       < 35   < 40     40-45     ----    20-24 Y       ----   < 40       >45     ----      >24 Y       ----   < 40     40-60      >60  .    Result canceled by the ancillary.      Cholesterol/HDL Ratio 05/30/2023 CANCELED   Final-Edited    Result canceled by the ancillary.    LDL 05/30/2023 CANCELED   Final-Edited    Comment: .                           NEAR      BORD      AGE      DESIRABLE  OPTIMAL    HIGH     HIGH     VERY HIGH     0-19 Y     0 - 109     ---    110-129   >/= 130     ----    20-24 Y     0 - 119     ---    120-159   >/= 160     ----      >24 Y     0 -  99   100-129  130-159   160-189     >/=190  .    Result canceled by the ancillary.      VLDL 05/30/2023 CANCELED   Final-Edited    Result canceled by the ancillary.    Triglycerides 05/30/2023 CANCELED   Final-Edited    Comment: .      AGE      DESIRABLE   BORDERLINE HIGH   HIGH     VERY HIGH   0 D-90 D    19 - 174         ----         ----        ----  91 D- 9 Y     0 -  74        75 -  99     >/= 100      ----    10-19 Y     0 -  89        90 - 129     >/= 130      ----    20-24 Y     0 - 114       115 - 149     >/= 150       ----         >24 Y     0 - 149       150 - 199    200- 499    >/= 500  .   Venipuncture immediately after or during the    administration of Metamizole may lead to falsely   low results. Testing should be performed immediately   prior to Metamizole dosing.    Result canceled by the ancillary.      Non HDL Cholesterol 05/30/2023 CANCELED   Final-Edited    Comment:     AGE      DESIRABLE   BORDERLINE HIGH   HIGH     VERY HIGH     0-19 Y     0 - 119       120 - 144     >/= 145    >/= 160    20-24 Y     0 - 149       150 - 189     >/= 190      ----         >24 Y    30 MG/DL ABOVE LDL CHOLESTEROL GOAL  .    Result canceled by the ancillary.      Cholesterol 05/30/2023 153  0 - 199 mg/dL Final    Comment: .      AGE      DESIRABLE   BORDERLINE HIGH   HIGH     0-19 Y     0 - 169       170 - 199     >/= 200    20-24 Y     0 - 189       190 - 224     >/= 225         >24 Y     0 - 199       200 - 239     >/= 240   **All ranges are based on fasting samples. Specific   therapeutic targets will vary based on patient-specific   cardiac risk.  .   Pediatric guidelines reference:Pediatrics 2011, 128(S5).   Adult guidelines reference: NCEP ATPIII Guidelines,     KHLOE 2001, 258:2486-97  .   Venipuncture immediately after or during the    administration of Metamizole may lead to falsely   low results. Testing should be performed immediately   prior to Metamizole dosing.      HDL 05/30/2023 44.2  mg/dL Final    Comment: .      AGE      VERY LOW   LOW     NORMAL    HIGH       0-19 Y       < 35   < 40     40-45     ----    20-24 Y       ----   < 40       >45     ----      >24 Y       ----   < 40     40-60      >60  .      Cholesterol/HDL Ratio 05/30/2023 3.5   Final    Comment: REF VALUES  DESIRABLE  < 3.4  HIGH RISK  > 5.0      LDL 05/30/2023 86  0 - 99 mg/dL Final    Comment: .                           NEAR      BORD      AGE      DESIRABLE  OPTIMAL    HIGH     HIGH     VERY HIGH     0-19 Y     0 - 109     ---    110-129   >/= 130      ----    20-24 Y     0 - 119     ---    120-159   >/= 160     ----      >24 Y     0 -  99   100-129  130-159   160-189     >/=190  .      VLDL 05/30/2023 23  0 - 40 mg/dL Final    Triglycerides 05/30/2023 115  0 - 149 mg/dL Final    Comment: .      AGE      DESIRABLE   BORDERLINE HIGH   HIGH     VERY HIGH   0 D-90 D    19 - 174         ----         ----        ----  91 D- 9 Y     0 -  74        75 -  99     >/= 100      ----    10-19 Y     0 -  89        90 - 129     >/= 130      ----    20-24 Y     0 - 114       115 - 149     >/= 150      ----         >24 Y     0 - 149       150 - 199    200- 499    >/= 500  .   Venipuncture immediately after or during the    administration of Metamizole may lead to falsely   low results. Testing should be performed immediately   prior to Metamizole dosing.      Glucose 05/30/2023 122 (H)  74 - 99 mg/dL Final    Sodium 05/30/2023 140  136 - 145 mmol/L Final    Potassium 05/30/2023 4.4  3.5 - 5.3 mmol/L Final    Chloride 05/30/2023 103  98 - 107 mmol/L Final    Bicarbonate 05/30/2023 27  21 - 32 mmol/L Final    Anion Gap 05/30/2023 14  10 - 20 mmol/L Final    Urea Nitrogen 05/30/2023 12  6 - 23 mg/dL Final    Creatinine 05/30/2023 0.77  0.50 - 1.05 mg/dL Final    GFR Female 05/30/2023 82  >90 mL/min/1.73m2 Final    Comment:  CALCULATIONS OF ESTIMATED GFR ARE PERFORMED   USING THE 2021 CKD-EPI STUDY REFIT EQUATION   WITHOUT THE RACE VARIABLE FOR THE IDMS-TRACEABLE   CREATININE METHODS.    https://jasn.asnjournals.org/content/early/2021/09/22/ASN.[email or web address]      Calcium 05/30/2023 9.7  8.6 - 10.3 mg/dL Final   Office Visit on 05/26/2023   Component Date Value Ref Range Status    POC Color, Urine 05/26/2023 Yellow  Straw, Yellow, Light Yellow Final    POC Appearance, Urine 05/26/2023 Clear  Clear Final    POC Specific Gravity, Urine 05/26/2023 >=1.030  1.005 - 1.035 Final    POC PH, Urine 05/26/2023 6.0  No Reference Range Established PH Final    POC Protein, Urine 05/26/2023 30  (1+)  NEGATIVE, 30 (1+) mg/dl Final    POC Glucose, Urine 05/26/2023 NEGATIVE  NEGATIVE mg/dl Final    POC Blood, Urine 05/26/2023 TRACE-Lysed (A)  NEGATIVE Final    POC Ketones, Urine 05/26/2023 NEGATIVE  NEGATIVE mg/dl Final    POC Bilirubin, Urine 05/26/2023 NEGATIVE  NEGATIVE Final    POC Urobilinogen, Urine 05/26/2023 0.2  0.2, 1.0 EU/DL Final    Poc Nitrate, Urine 05/26/2023 NEGATIVE  NEGATIVE Final    POC Leukocytes, Urine 05/26/2023 NEGATIVE  NEGATIVE Final   Office Visit on 04/05/2023   Component Date Value Ref Range Status    POC HEMOGLOBIN A1c 04/05/2023 6.6 (A)  4.2 - 6.5 % Final   [facility] Encounter on 01/04/2023   Component Date Value Ref Range Status    ALBUMIN (MG/L) IN URINE 01/04/2023 <7.0  Not Established mg/L Final    Albumin/Creatine Ratio 01/04/2023 SEE COMMENT  0.0 - 30.0 ug/mg crt Final    Comment: One or more analytes used in this calculation   is outside of the analytical measurement range.  Calculation cannot be performed.      Creatinine, Urine 01/04/2023 135.0  20.0 - 320.0 mg/dL Final        Current Outpatient Medications:     aspirin 81 mg EC tablet, Take 1 tablet (81 mg) by mouth once daily., Disp: , Rfl:     ezetimibe (Zetia) 10 mg tablet, Take 1 tablet (10 mg) by mouth once daily., Disp: , Rfl:     fluconazole (Diflucan) 150 mg tablet, Take 1 tablet once and 1 tablet in 3 days if no better, Disp: 2 tablet, Rfl: 0    latanoprost (Xalatan) 0.005 % ophthalmic solution, Administer 1 drop into both eyes once daily at bedtime., Disp: , Rfl:     lidocaine (Xylocaine) 5 % ointment, Apply 1 Application topically 3 times a day., Disp: , Rfl:     metFORMIN (Glucophage) 500 mg tablet, Take 2 tablets in the morning and 1 tablet in the evening, Disp: 270 tablet, Rfl: 3    nitroglycerin (Nitrostat) 0.4 mg SL tablet, Place 1 tablet (0.4 mg) under the tongue every 5 minutes if needed for chest pain., Disp: , Rfl:     OneTouch Delica Plus Lancet 33 gauge misc, TEST TWICE DAILY, Disp: 200 each, Rfl: 3     OneTouch Ultra Test strip, Use to check blood sugar 3 times daily., Disp: 200 strip, Rfl: 3     Assessment/Plan     73-year-old patient with longstanding history of diabetes referred for microscopic hematuria.  Denies history of gross hematuria.  No history of stones in self or family.  Reviewed prior UAs in the system, has occasionally had trace blood in the urine.  I suspect this is related to diabetic kidney disease.  Of note, her EGFR has been between 60-80 since 2017.  She does not have protein in the urine.  I discussed with her that she has CKD stage G2 A1 likely due to diabetic kidney disease and microscopic hematuria is also likely due to diabetes [30% of diabetic kidney disease patients have microscopic hematuria].  Will repeat UA today.  We will also check RFP and serum immunofixation electrophoresis.  We will check renal ultrasound for renal cysts or stones.  Advised patient to avoid NSAIDs, work with PCP for tight control of diabetes.    RTC: 1 year

## 2023-10-10 ENCOUNTER — LAB (OUTPATIENT)
Dept: LAB | Facility: LAB | Age: 73
End: 2023-10-10
Payer: MEDICARE

## 2023-10-10 DIAGNOSIS — I10 BENIGN ESSENTIAL HYPERTENSION: ICD-10-CM

## 2023-10-10 LAB
ALBUMIN SERPL BCP-MCNC: 4.7 G/DL (ref 3.4–5)
ANION GAP SERPL CALC-SCNC: 14 MMOL/L (ref 10–20)
APPEARANCE UR: CLEAR
BILIRUB UR STRIP.AUTO-MCNC: NEGATIVE MG/DL
BUN SERPL-MCNC: 18 MG/DL (ref 6–23)
CALCIUM SERPL-MCNC: 10.4 MG/DL (ref 8.6–10.3)
CHLORIDE SERPL-SCNC: 102 MMOL/L (ref 98–107)
CO2 SERPL-SCNC: 26 MMOL/L (ref 21–32)
COLOR UR: YELLOW
CREAT SERPL-MCNC: 0.89 MG/DL (ref 0.5–1.05)
GFR SERPL CREATININE-BSD FRML MDRD: 69 ML/MIN/1.73M*2
GLUCOSE SERPL-MCNC: 113 MG/DL (ref 74–99)
GLUCOSE UR STRIP.AUTO-MCNC: NEGATIVE MG/DL
KETONES UR STRIP.AUTO-MCNC: NEGATIVE MG/DL
LEUKOCYTE ESTERASE UR QL STRIP.AUTO: ABNORMAL
MUCOUS THREADS #/AREA URNS AUTO: NORMAL /LPF
NITRITE UR QL STRIP.AUTO: NEGATIVE
PH UR STRIP.AUTO: 5.5 [PH]
PHOSPHATE SERPL-MCNC: 3.1 MG/DL (ref 2.5–4.9)
POTASSIUM SERPL-SCNC: 4.3 MMOL/L (ref 3.5–5.3)
PROT SERPL-MCNC: 8.1 G/DL (ref 6.4–8.2)
PROT UR STRIP.AUTO-MCNC: NEGATIVE MG/DL
RBC # UR STRIP.AUTO: NEGATIVE /UL
RBC #/AREA URNS AUTO: NORMAL /HPF
SODIUM SERPL-SCNC: 138 MMOL/L (ref 136–145)
SP GR UR STRIP.AUTO: 1.02
SQUAMOUS #/AREA URNS AUTO: NORMAL /HPF
UROBILINOGEN UR STRIP.AUTO-MCNC: 2 MG/DL
WBC #/AREA URNS AUTO: NORMAL /HPF

## 2023-10-10 PROCEDURE — 36415 COLL VENOUS BLD VENIPUNCTURE: CPT

## 2023-10-10 PROCEDURE — 86320 SERUM IMMUNOELECTROPHORESIS: CPT | Performed by: INTERNAL MEDICINE

## 2023-10-10 PROCEDURE — 84155 ASSAY OF PROTEIN SERUM: CPT

## 2023-10-10 PROCEDURE — 84165 PROTEIN E-PHORESIS SERUM: CPT | Performed by: INTERNAL MEDICINE

## 2023-10-10 PROCEDURE — 84165 PROTEIN E-PHORESIS SERUM: CPT

## 2023-10-10 PROCEDURE — 86334 IMMUNOFIX E-PHORESIS SERUM: CPT

## 2023-10-10 PROCEDURE — 80069 RENAL FUNCTION PANEL: CPT

## 2023-10-11 ENCOUNTER — TELEPHONE (OUTPATIENT)
Dept: NEPHROLOGY | Facility: HOSPITAL | Age: 73
End: 2023-10-11
Payer: MEDICARE

## 2023-10-15 LAB
ALBUMIN: 4.4 G/DL (ref 3.4–5)
ALPHA 1 GLOBULIN: 0.3 G/DL (ref 0.2–0.6)
ALPHA 2 GLOBULIN: 1 G/DL (ref 0.4–1.1)
BETA GLOBULIN: 1.1 G/DL (ref 0.5–1.2)
GAMMA GLOBULIN: 1.3 G/DL (ref 0.5–1.4)
IMMUNOFIXATION COMMENT: NORMAL
PATH REVIEW - SERUM IMMUNOFIXATION: NORMAL
PATH REVIEW-SERUM PROTEIN ELECTROPHORESIS: NORMAL
PROTEIN ELECTROPHORESIS COMMENT: NORMAL

## 2023-10-29 DIAGNOSIS — H40.003 GLAUCOMA SUSPECT OF BOTH EYES: Primary | ICD-10-CM

## 2023-10-30 RX ORDER — LATANOPROST 50 UG/ML
1 SOLUTION/ DROPS OPHTHALMIC NIGHTLY
Qty: 7.5 ML | Refills: 3 | Status: SHIPPED | OUTPATIENT
Start: 2023-10-30

## 2023-11-01 ENCOUNTER — HOSPITAL ENCOUNTER (OUTPATIENT)
Dept: RADIOLOGY | Facility: HOSPITAL | Age: 73
Discharge: HOME | End: 2023-11-01
Payer: MEDICARE

## 2023-11-01 DIAGNOSIS — I10 ESSENTIAL (PRIMARY) HYPERTENSION: ICD-10-CM

## 2023-11-01 PROCEDURE — 76770 US EXAM ABDO BACK WALL COMP: CPT

## 2023-11-01 PROCEDURE — 76770 US EXAM ABDO BACK WALL COMP: CPT | Performed by: RADIOLOGY

## 2023-11-07 ENCOUNTER — OFFICE VISIT (OUTPATIENT)
Dept: PRIMARY CARE | Facility: CLINIC | Age: 73
End: 2023-11-07
Payer: MEDICARE

## 2023-11-07 VITALS
BODY MASS INDEX: 28.51 KG/M2 | HEART RATE: 72 BPM | WEIGHT: 151 LBS | DIASTOLIC BLOOD PRESSURE: 77 MMHG | HEIGHT: 61 IN | SYSTOLIC BLOOD PRESSURE: 143 MMHG

## 2023-11-07 DIAGNOSIS — M54.50 LUMBAR PAIN: Primary | ICD-10-CM

## 2023-11-07 PROCEDURE — 3078F DIAST BP <80 MM HG: CPT | Performed by: FAMILY MEDICINE

## 2023-11-07 PROCEDURE — 1159F MED LIST DOCD IN RCRD: CPT | Performed by: FAMILY MEDICINE

## 2023-11-07 PROCEDURE — 1036F TOBACCO NON-USER: CPT | Performed by: FAMILY MEDICINE

## 2023-11-07 PROCEDURE — 1160F RVW MEDS BY RX/DR IN RCRD: CPT | Performed by: FAMILY MEDICINE

## 2023-11-07 PROCEDURE — 3066F NEPHROPATHY DOC TX: CPT | Performed by: FAMILY MEDICINE

## 2023-11-07 PROCEDURE — 1126F AMNT PAIN NOTED NONE PRSNT: CPT | Performed by: FAMILY MEDICINE

## 2023-11-07 PROCEDURE — 99213 OFFICE O/P EST LOW 20 MIN: CPT | Performed by: FAMILY MEDICINE

## 2023-11-07 PROCEDURE — 3077F SYST BP >= 140 MM HG: CPT | Performed by: FAMILY MEDICINE

## 2023-11-07 PROCEDURE — 96372 THER/PROPH/DIAG INJ SC/IM: CPT | Performed by: FAMILY MEDICINE

## 2023-11-07 RX ORDER — OMEPRAZOLE 20 MG/1
CAPSULE, DELAYED RELEASE ORAL
COMMUNITY
Start: 2023-11-07 | End: 2024-04-15

## 2023-11-07 RX ORDER — KETOROLAC TROMETHAMINE 30 MG/ML
60 INJECTION, SOLUTION INTRAMUSCULAR; INTRAVENOUS ONCE
Status: COMPLETED | OUTPATIENT
Start: 2023-11-07 | End: 2023-11-07

## 2023-11-07 RX ADMIN — KETOROLAC TROMETHAMINE 60 MG: 30 INJECTION, SOLUTION INTRAMUSCULAR; INTRAVENOUS at 09:42

## 2023-11-07 NOTE — PROGRESS NOTES
"Pt complains of lower back muscle spasms that started 11/5/23.  Pt has not tried OTC meds.     Chief Complaint:   No chief complaint on file.     History Of Present Illness:    Taylor Richard is a 73 y.o. female presenting to clinic for back pain.       Back Pain: Started incidentally 2 days ago, no trauma, localized to the right sided low back and radiates up and down her spine. Pain is constant, with episodes of sharp shooting pain and muscle tightness upon movement. Has had similar symptoms in the past with instant relief from toradol injection in the past, last given 06/2022. She does not tolerate muscle relaxers. Denies recent imaging. Saw pain management physician about 6 months ago, but did not want to continue care. Last bowel movement was last night. Denies blood in stool, hematuria, incontinence, saddle anesthesia, or abdominal pain. Pt received toradol injection in the left glute today, reports relief and no adverse effects.   PMH: DJD, OA, RA  .       Review of Systems:    Negative  Constitutional: fever, chills, night sweats, unexpected weight change, changes in sleep  Eyes: loss of vision, double vision, floaters  Ear/Nose/Throat/Mouth: sore throat, hearing changes, sinus congestion  Cardiovascular: chest pain, chest heaviness, palpitations, swelling in ankles  Endocrine: excessive thirst, feeling too hot, feeling too cold, feeling tired, fatigue  Respiratory: shortness of breath, difficulty breathing, frequent cough, wheezing  Neurological: loss of consciousness, tremors, dizzy spells, numbness, tingling.  Sexual: sexual health concerns      Positive  Psychological: feeling generally happy, feeling safe at home      Last Recorded Vitals:  Vitals:    11/07/23 0859   BP: 143/77   Pulse: 72   Weight: 68.5 kg (151 lb)   Height: 1.549 m (5' 1\")       Past Medical History:  She has a past medical history of Acute candidiasis of vulva and vagina (01/06/2017), Age-related nuclear cataract, left eye " (05/21/2018), Age-related nuclear cataract, right eye (01/10/2018), Cellulitis of left lower limb (05/27/2021), Chronic viral hepatitis C (CMS/HCC) (08/14/2017), Combined forms of age-related cataract, bilateral (12/13/2017), Contusion of right eyelid and periocular area, initial encounter (01/10/2018), Cortical age-related cataract, left eye (05/21/2018), Cortical age-related cataract, right eye (01/10/2018), Edema of larynx (12/30/2016), Encounter for immunization (05/26/2021), Hypertensive retinopathy, bilateral (12/19/2022), Meibomian gland dysfunction of unspecified eye, unspecified eyelid (12/19/2022), Myopia, bilateral (12/19/2022), Ocular hypertension, unspecified eye (01/15/2016), Ocular pain, right eye (01/10/2018), Other conditions influencing health status (05/21/2018), Other pulmonary collapse (06/28/2016), Other specified noninflammatory disorders of vulva and perineum (12/15/2014), Pain in right shoulder (04/06/2016), Pain in unspecified ankle and joints of unspecified foot (10/27/2015), Personal history of nicotine dependence (01/04/2023), Personal history of other diseases of the digestive system (08/17/2017), Personal history of other diseases of the respiratory system (05/03/2018), Personal history of other infectious and parasitic diseases (09/15/2021), Personal history of other infectious and parasitic diseases, Personal history of other specified conditions (08/14/2017), Personal history of other specified conditions (03/27/2018), Prediabetes (12/19/2022), Preglaucoma, unspecified, bilateral (12/19/2022), Presence of intraocular lens (02/15/2018), Presence of intraocular lens (12/19/2022), Right upper quadrant pain (10/14/2021), Right upper quadrant pain (10/14/2021), Strain of unspecified muscle, fascia and tendon at shoulder and upper arm level, right arm, initial encounter (04/06/2016), Systemic lupus erythematosus, unspecified (CMS/[facility]) (12/19/2022), Systemic lupus erythematosus,  unspecified (CMS/[facility]) (11/29/2017), Type 2 diabetes mellitus without complications (CMS/[facility]) (01/04/2023), Type 2 diabetes mellitus without complications (CMS/[facility]) (12/13/2017), Type 2 diabetes mellitus without complications (CMS/[facility]) (10/31/2017), Unspecified symptoms and signs involving the genitourinary system (09/15/2021), and Unspecified visual field defects (04/27/2016).    Past Surgical History:  She has a past surgical history that includes Total abdominal hysterectomy w/ bilateral salpingoophorectomy (11/07/2014); Other surgical history (10/14/2021); Other surgical history (10/14/2021); Other surgical history (10/14/2021); Other surgical history (03/17/2022); and CT angio coronary art with heartflow if score >30% (2/18/2019).      Social History:  She reports that she has never smoked. She has never used smokeless tobacco. She reports current alcohol use. She reports that she does not use drugs.    Family History:  Family History   Problem Relation Name Age of Onset    COPD Mother      Colon cancer Mother      Prostate cancer Father      Multiple myeloma Sister      Hodgkin's lymphoma Brother      Breast cancer Daughter          Allergies:  Acetaminophen, Isosorbide, Other, Pravastatin, Codeine, Losartan, and Penicillins    Outpatient Medications:  Current Outpatient Medications   Medication Instructions    aspirin 81 mg, oral, Daily    ezetimibe (ZETIA) 10 mg, oral, Daily    fluconazole (Diflucan) 150 mg tablet Take 1 tablet once and 1 tablet in 3 days if no better    latanoprost (Xalatan) 0.005 % ophthalmic solution 1 drop, Both Eyes, Nightly    lidocaine (Xylocaine) 5 % ointment 1 Application, Topical, 3 times daily    metFORMIN (Glucophage) 500 mg tablet Take 2 tablets in the morning and 1 tablet in the evening    nitroglycerin (NITROSTAT) 0.4 mg, sublingual, Every 5 min PRN    omeprazole (PriLOSEC) 20 mg DR capsule     OneTouch Delica Plus Lancet 33 gauge misc TEST TWICE DAILY    OneTouch Ultra Test  strip Use to check blood sugar 3 times daily.       Physical Exam:  GENERAL: Well developed, well nourished, alert and cooperative, and appears to be in no acute distress.  PSYCH: mood pleasant and appropriate  HEAD: normocephalic  EYES: PERRL, EOMI. vision is grossly intact.   NECK: Neck supple, non-tender. No masses, no thyromegaly. No anterior cervical lymphadenopathy.  LUNGS: Clear to auscultation without rales, rhonchi, wheezing.  ABD: soft, nontender, no masses palpated. No HSM. No R/G. No CVA tenderness to palpation b/l  GAIT: steady after injection  BACK: no cervical spine ttp. L-spine nontender. Right paraspinal mm l-spine ttp. Neg seated slump test b/l  NEUROLOGICAL: CN II-XII grossly intact. Strength and sensation symmetric and intact throughout all 4 extremities.        Last Labs:  CBC -  Lab Results   Component Value Date    WBC 9.3 10/27/2022    HGB 11.1 (L) 10/27/2022    HCT 32.5 (L) 10/27/2022    MCV 75 (L) 10/27/2022     10/27/2022       CMP -  Lab Results   Component Value Date    CALCIUM 10.4 (H) 10/10/2023    PHOS 3.1 10/10/2023    PROT 8.1 10/10/2023    ALBUMIN 4.7 10/10/2023    AST 14 10/27/2022    ALT 6 (L) 10/27/2022    ALKPHOS 70 10/27/2022    BILITOT 0.2 10/27/2022       LIPID PANEL -   Lab Results   Component Value Date    CHOL 153 05/30/2023    TRIG 115 05/30/2023    HDL 44.2 05/30/2023    CHHDL 3.5 05/30/2023    LDLF 86 05/30/2023    VLDL 23 05/30/2023    NHDL CANCELED 05/30/2023         Lab Results   Component Value Date    BNP 29 03/18/2022    HGBA1C 6.9 (A) 08/03/2023       Assessment/Plan   Problem List Items Addressed This Visit             ICD-10-CM    Lumbar pain - Primary M54.50    Relevant Medications    ketorolac (Toradol) injection 60 mg (Completed)     Pt immediately improved after toradol left buttock injection. SW PGY-1 in room for disposition. Pt had no dizziness or SOB or trouble breathing after the injection.    Follow up as needed    Julio Gray, DO

## 2023-11-08 ENCOUNTER — ANCILLARY PROCEDURE (OUTPATIENT)
Dept: RADIOLOGY | Facility: CLINIC | Age: 73
End: 2023-11-08
Payer: MEDICARE

## 2023-11-08 DIAGNOSIS — S29.011A STRAIN OF MUSCLE AND TENDON OF FRONT WALL OF THORAX, INITIAL ENCOUNTER: ICD-10-CM

## 2023-11-08 PROCEDURE — 71101 X-RAY EXAM UNILAT RIBS/CHEST: CPT | Mod: RIGHT SIDE | Performed by: RADIOLOGY

## 2023-11-08 PROCEDURE — 71101 X-RAY EXAM UNILAT RIBS/CHEST: CPT | Mod: RT,FY

## 2023-11-09 ENCOUNTER — APPOINTMENT (OUTPATIENT)
Dept: PRIMARY CARE | Facility: CLINIC | Age: 73
End: 2023-11-09
Payer: MEDICARE

## 2023-11-13 ENCOUNTER — APPOINTMENT (OUTPATIENT)
Dept: NUTRITION | Facility: CLINIC | Age: 73
End: 2023-11-13
Payer: MEDICARE

## 2023-11-17 DIAGNOSIS — E11.9 TYPE 2 DIABETES MELLITUS WITHOUT COMPLICATION, WITHOUT LONG-TERM CURRENT USE OF INSULIN (MULTI): ICD-10-CM

## 2023-11-20 RX ORDER — BLOOD SUGAR DIAGNOSTIC
STRIP MISCELLANEOUS
Qty: 300 STRIP | Refills: 2 | Status: SHIPPED | OUTPATIENT
Start: 2023-11-20

## 2023-11-22 ENCOUNTER — OFFICE VISIT (OUTPATIENT)
Dept: PRIMARY CARE | Facility: CLINIC | Age: 73
End: 2023-11-22
Payer: MEDICARE

## 2023-11-22 VITALS
SYSTOLIC BLOOD PRESSURE: 130 MMHG | BODY MASS INDEX: 28.58 KG/M2 | OXYGEN SATURATION: 98 % | HEART RATE: 74 BPM | WEIGHT: 151.4 LBS | DIASTOLIC BLOOD PRESSURE: 71 MMHG | HEIGHT: 61 IN

## 2023-11-22 DIAGNOSIS — I25.10 CAD S/P PERCUTANEOUS CORONARY ANGIOPLASTY: ICD-10-CM

## 2023-11-22 DIAGNOSIS — B37.31 YEAST VAGINITIS: ICD-10-CM

## 2023-11-22 DIAGNOSIS — E11.9 TYPE 2 DIABETES MELLITUS WITHOUT COMPLICATION, WITHOUT LONG-TERM CURRENT USE OF INSULIN (MULTI): Primary | ICD-10-CM

## 2023-11-22 DIAGNOSIS — Z98.61 CAD S/P PERCUTANEOUS CORONARY ANGIOPLASTY: ICD-10-CM

## 2023-11-22 DIAGNOSIS — I10 BENIGN ESSENTIAL HYPERTENSION: ICD-10-CM

## 2023-11-22 LAB — POC HEMOGLOBIN A1C: 7.1 % (ref 4.2–6.5)

## 2023-11-22 PROCEDURE — 1160F RVW MEDS BY RX/DR IN RCRD: CPT | Performed by: NURSE PRACTITIONER

## 2023-11-22 PROCEDURE — 1126F AMNT PAIN NOTED NONE PRSNT: CPT | Performed by: NURSE PRACTITIONER

## 2023-11-22 PROCEDURE — 3075F SYST BP GE 130 - 139MM HG: CPT | Performed by: NURSE PRACTITIONER

## 2023-11-22 PROCEDURE — 83036 HEMOGLOBIN GLYCOSYLATED A1C: CPT | Performed by: NURSE PRACTITIONER

## 2023-11-22 PROCEDURE — 1036F TOBACCO NON-USER: CPT | Performed by: NURSE PRACTITIONER

## 2023-11-22 PROCEDURE — 3066F NEPHROPATHY DOC TX: CPT | Performed by: NURSE PRACTITIONER

## 2023-11-22 PROCEDURE — 1159F MED LIST DOCD IN RCRD: CPT | Performed by: NURSE PRACTITIONER

## 2023-11-22 PROCEDURE — 99214 OFFICE O/P EST MOD 30 MIN: CPT | Performed by: NURSE PRACTITIONER

## 2023-11-22 PROCEDURE — 3078F DIAST BP <80 MM HG: CPT | Performed by: NURSE PRACTITIONER

## 2023-11-22 RX ORDER — FLUCONAZOLE 150 MG/1
TABLET ORAL
Qty: 2 TABLET | Refills: 0 | Status: SHIPPED | OUTPATIENT
Start: 2023-11-22 | End: 2024-02-01 | Stop reason: WASHOUT

## 2023-11-22 NOTE — PROGRESS NOTES
"Subjective   Patient ID: Taylor Richard is a 73 y.o. female who presents for chronic care.    HPI     T2DM  Last HgA1c: 6.9% on 8/3/23  Current meds: metformin 1000 mg QAM and 500 mg QPM  Home glucose monitoring: every day, has been running 110s over the last 3 weeks   Diet: fair, has followed with nutritionist  Exercise: occasional walking  Statin: none, pravastatin caused chest pain - on Zetia  ACEI: none  ASA: yes, 81 mg QD  Last urine albumin: 1/4/23  Diabetic foot exam:   Vision exam: 2/6/23  Dental cleaning: Wears dentures   Pneumovax: 2020     Denies polyuria, polydipsia, vision changes, neuropathy, recurrent yeast infections, or hypoglycemic episodes.      HTN/CAD  Current meds: Zetia 10 mg every day, nitroglycerin prn  Home blood pressure monitoring: every day, running 130s/70-80  Salt intake: limits   Caffeine intake: occasional tea  Diet: as noted above  Exercise: as noted above  Alcohol use: 1 pint of wine every other day  Tobacco use: none  Illicit drug use: none     Denies vision changes, headaches, dizziness, chest pain, palpitations, or edema.    Developed vaginal itching and discharge a couple days ago. Admits she has been using Summer's Aury and Black Soap to wash.     Review of Systems  ROS negative except as noted above in HPI.       Objective   /71   Pulse 74   Ht 1.549 m (5' 1\")   Wt 68.7 kg (151 lb 6.4 oz)   SpO2 98%   BMI 28.61 kg/m²     Physical Exam  General: Alert and oriented, in no acute distress. Appears stated age, well-nourished, and well hydrated  HEENT:  - Head: Normocephalic and atraumatic   - Eyes: EOMI, PERRLA  - ENT: Hearing grossly intact  Heart: RRR. No murmurs, clicks, or rubs  Lungs: Unlabored breathing. CTAB with no crackles, wheezes, or rhonchi  Abdomen: Normal BS in all 4 quadrants. Soft, non-tender, non-distended, with no masses  Extremities: Warm and well perfused. No edema. Normal peripheral pulses  Musculoskeletal: Normal gait and station  Neurological: " Alert and oriented. No gross neurological deficits  Psychological: Appropriate mood and affect  Skin: No rash, abnormal lesions, cyanosis, or erythema  Monofilament exam: Normal sensation throughout both feet      Assessment/Plan   T2DM  - IO HgA1c: 7.1%  - Continue metformin 1000 mg QAM and 500 mg QPM  - Counseled on diet, follow up with dietician  - Continue to check glucose at home every day  - Urine albumin up to date  - PPSV23 up to date  - Monofilament exam done and normal   - Encouraged yearly retinal eye exams and twice yearly dental cleanings  - Counseled on lifestyle management     HTN/CAD  - BP controlled  - Continue ASA 81 mg every day, Zetia 10 mg every day, and nitroglycerin prn  - Continue to check BP at home every day  - Counseled on lifestyle management  - Follow up with cardiology as scheduled on 1/29/23     Yeast vaginitis  - RX fluconazole 150 mg once, repeat in 3 days if no better  - Advised to avoid vaginal hygiene products and wash with just Dove soap or water    RTC in 4 months or sooner prn    GISELA Crane-CNP  [facility]

## 2023-12-04 ENCOUNTER — TELEPHONE (OUTPATIENT)
Dept: CARDIOLOGY | Facility: HOSPITAL | Age: 73
End: 2023-12-04
Payer: MEDICARE

## 2023-12-05 DIAGNOSIS — R07.9 CHEST PAIN, UNSPECIFIED TYPE: ICD-10-CM

## 2023-12-05 DIAGNOSIS — Z98.61 CAD S/P PERCUTANEOUS CORONARY ANGIOPLASTY: Primary | ICD-10-CM

## 2023-12-05 DIAGNOSIS — I25.10 CAD S/P PERCUTANEOUS CORONARY ANGIOPLASTY: Primary | ICD-10-CM

## 2023-12-12 ENCOUNTER — HOSPITAL ENCOUNTER (OUTPATIENT)
Dept: CARDIOLOGY | Facility: CLINIC | Age: 73
Discharge: HOME | End: 2023-12-12
Payer: MEDICARE

## 2023-12-12 DIAGNOSIS — I25.10 CAD S/P PERCUTANEOUS CORONARY ANGIOPLASTY: ICD-10-CM

## 2023-12-12 DIAGNOSIS — Z98.61 CAD S/P PERCUTANEOUS CORONARY ANGIOPLASTY: ICD-10-CM

## 2023-12-12 DIAGNOSIS — R07.9 CHEST PAIN, UNSPECIFIED TYPE: ICD-10-CM

## 2023-12-12 PROCEDURE — 93017 CV STRESS TEST TRACING ONLY: CPT

## 2023-12-12 PROCEDURE — 93350 STRESS TTE ONLY: CPT | Performed by: INTERNAL MEDICINE

## 2023-12-12 PROCEDURE — 93018 CV STRESS TEST I&R ONLY: CPT | Performed by: INTERNAL MEDICINE

## 2023-12-12 PROCEDURE — 93016 CV STRESS TEST SUPVJ ONLY: CPT | Performed by: INTERNAL MEDICINE

## 2023-12-15 ENCOUNTER — APPOINTMENT (OUTPATIENT)
Dept: CARDIOLOGY | Facility: CLINIC | Age: 73
End: 2023-12-15
Payer: MEDICARE

## 2024-01-03 ENCOUNTER — APPOINTMENT (OUTPATIENT)
Dept: PRIMARY CARE | Facility: CLINIC | Age: 74
End: 2024-01-03
Payer: MEDICARE

## 2024-01-08 ENCOUNTER — TELEPHONE (OUTPATIENT)
Dept: PRIMARY CARE | Facility: CLINIC | Age: 74
End: 2024-01-08
Payer: MEDICARE

## 2024-01-09 DIAGNOSIS — Z87.891 FORMER CIGARETTE SMOKER: Primary | ICD-10-CM

## 2024-01-15 ENCOUNTER — ANCILLARY PROCEDURE (OUTPATIENT)
Dept: RADIOLOGY | Facility: CLINIC | Age: 74
End: 2024-01-15
Payer: MEDICARE

## 2024-01-15 DIAGNOSIS — Z87.891 FORMER CIGARETTE SMOKER: ICD-10-CM

## 2024-01-15 PROCEDURE — 71271 CT THORAX LUNG CANCER SCR C-: CPT

## 2024-01-15 PROCEDURE — 71271 CT THORAX LUNG CANCER SCR C-: CPT | Performed by: RADIOLOGY

## 2024-01-29 ENCOUNTER — APPOINTMENT (OUTPATIENT)
Dept: CARDIOLOGY | Facility: CLINIC | Age: 74
End: 2024-01-29
Payer: MEDICARE

## 2024-01-29 DIAGNOSIS — E11.9 TYPE 2 DIABETES MELLITUS WITHOUT COMPLICATION, WITHOUT LONG-TERM CURRENT USE OF INSULIN (MULTI): ICD-10-CM

## 2024-01-30 ENCOUNTER — APPOINTMENT (OUTPATIENT)
Dept: PRIMARY CARE | Facility: CLINIC | Age: 74
End: 2024-01-30
Payer: MEDICARE

## 2024-01-30 RX ORDER — LANCETS 33 GAUGE
EACH MISCELLANEOUS
Qty: 200 EACH | Refills: 2 | Status: SHIPPED | OUTPATIENT
Start: 2024-01-30 | End: 2024-02-01 | Stop reason: WASHOUT

## 2024-01-31 RX ORDER — PRAVASTATIN SODIUM 10 MG/1
TABLET ORAL
COMMUNITY
Start: 2020-05-29 | End: 2024-02-01 | Stop reason: WASHOUT

## 2024-01-31 RX ORDER — ONDANSETRON 4 MG/1
TABLET, ORALLY DISINTEGRATING ORAL EVERY 8 HOURS
COMMUNITY
Start: 2020-09-24 | End: 2024-02-01 | Stop reason: WASHOUT

## 2024-01-31 RX ORDER — METHYLPREDNISOLONE 4 MG/1
TABLET ORAL
COMMUNITY
Start: 2019-08-02 | End: 2024-02-01 | Stop reason: WASHOUT

## 2024-01-31 RX ORDER — PANTOPRAZOLE SODIUM 40 MG/1
TABLET, DELAYED RELEASE ORAL
COMMUNITY
Start: 2020-09-24 | End: 2024-02-01 | Stop reason: WASHOUT

## 2024-01-31 RX ORDER — ISOSORBIDE MONONITRATE 30 MG/1
TABLET, EXTENDED RELEASE ORAL
COMMUNITY
Start: 2021-01-29 | End: 2024-02-01 | Stop reason: WASHOUT

## 2024-02-01 ENCOUNTER — OFFICE VISIT (OUTPATIENT)
Dept: GASTROENTEROLOGY | Facility: CLINIC | Age: 74
End: 2024-02-01
Payer: MEDICARE

## 2024-02-01 VITALS
WEIGHT: 151 LBS | DIASTOLIC BLOOD PRESSURE: 72 MMHG | SYSTOLIC BLOOD PRESSURE: 145 MMHG | BODY MASS INDEX: 28.51 KG/M2 | HEART RATE: 65 BPM | TEMPERATURE: 97 F | HEIGHT: 61 IN

## 2024-02-01 DIAGNOSIS — Z86.19 HEPATITIS C VIRUS INFECTION CURED AFTER ANTIVIRAL DRUG THERAPY: Primary | ICD-10-CM

## 2024-02-01 DIAGNOSIS — Z71.41 ALCOHOL ABUSE COUNSELING AND SURVEILLANCE: ICD-10-CM

## 2024-02-01 DIAGNOSIS — K21.9 GERD WITHOUT ESOPHAGITIS: ICD-10-CM

## 2024-02-01 PROCEDURE — 3078F DIAST BP <80 MM HG: CPT | Performed by: INTERNAL MEDICINE

## 2024-02-01 PROCEDURE — 99213 OFFICE O/P EST LOW 20 MIN: CPT | Performed by: INTERNAL MEDICINE

## 2024-02-01 PROCEDURE — 1036F TOBACCO NON-USER: CPT | Performed by: INTERNAL MEDICINE

## 2024-02-01 PROCEDURE — 1126F AMNT PAIN NOTED NONE PRSNT: CPT | Performed by: INTERNAL MEDICINE

## 2024-02-01 PROCEDURE — 1159F MED LIST DOCD IN RCRD: CPT | Performed by: INTERNAL MEDICINE

## 2024-02-01 PROCEDURE — 3077F SYST BP >= 140 MM HG: CPT | Performed by: INTERNAL MEDICINE

## 2024-02-01 PROCEDURE — 1160F RVW MEDS BY RX/DR IN RCRD: CPT | Performed by: INTERNAL MEDICINE

## 2024-02-01 ASSESSMENT — PAIN SCALES - GENERAL: PAINLEVEL: 0-NO PAIN

## 2024-02-01 NOTE — PATIENT INSTRUCTIONS
Welcome to Dr[facility].  Dr. Yeh sees patients at the following sites:  [place] [address] [facility] at [facility] [address], [address] at [facility] at [facility], [facility] [address]    Dr. Yeh's hepatology care coordinator, Ama HUERTA, can be reached at [phone].  Dr. Yeh's , Toshia John, can be reached at [phone].    Breath test was negative for bacterial overgrowth.    Continue omeprazole.    We discussed cutting back alcohol consumption.    Recommend checking a liver panel.    Annual follow up

## 2024-02-01 NOTE — PROGRESS NOTES
"Subjective     History of Present Illness:   Taylor Richard is a 73 y.o. female who presents to GI/Liver clinic for follow up of HCV infection (cured), GERD, dyspepsia.    Summary:     In 2009, a liver biopsy demonstrated stage 1-2 fibrosis. She was initially treated with peg IFN and RBV and was a non-responder. In 2014, she was retreated with Harvoni and had a sustained response. She has been cured from her HCV infection. Follow-up Fibroscan last year with low LSM c/w very mild liver disease.     There has been no h/o hepatic decompensation.     In the past she had reported some moderate alcohol consumption - I have advised her in the past to stop alcohol and minimize consumption. She continues to drink fairly regularly, \"out of boredom.\"     The past few visits she has had a number of upper GI symptoms including epigastric discomfort and an occasional RUQ pain. She did a RUQ and liver ultrasound. She then had an upper endoscopy.   She has issues with bloating and distension.   H2 breath test for SIBO (9/2023): negative.     She has had severe heartburn - better on omeprazole, under control.    Does drink white wine - a pint every day or two.      Review of Systems  Review of Systems   All other systems reviewed and are negative.      Past Medical History   has a past medical history of Acute candidiasis of vulva and vagina (01/06/2017), Age-related nuclear cataract, left eye (05/21/2018), Age-related nuclear cataract, right eye (01/10/2018), Cellulitis of left lower limb (05/27/2021), Chronic viral hepatitis C (CMS/HCC) (08/14/2017), Combined forms of age-related cataract, bilateral (12/13/2017), Contusion of right eyelid and periocular area, initial encounter (01/10/2018), Cortical age-related cataract, left eye (05/21/2018), Cortical age-related cataract, right eye (01/10/2018), Edema of larynx (12/30/2016), Encounter for immunization (05/26/2021), Hypertensive retinopathy, bilateral (12/19/2022), Meibomian " gland dysfunction of unspecified eye, unspecified eyelid (12/19/2022), Myopia, bilateral (12/19/2022), Ocular hypertension, unspecified eye (01/15/2016), Ocular pain, right eye (01/10/2018), Other conditions influencing health status (05/21/2018), Other pulmonary collapse (06/28/2016), Other specified noninflammatory disorders of vulva and perineum (12/15/2014), Pain in right shoulder (04/06/2016), Pain in unspecified ankle and joints of unspecified foot (10/27/2015), Personal history of nicotine dependence (01/04/2023), Personal history of other diseases of the digestive system (08/17/2017), Personal history of other diseases of the respiratory system (05/03/2018), Personal history of other infectious and parasitic diseases (09/15/2021), Personal history of other infectious and parasitic diseases, Personal history of other specified conditions (08/14/2017), Personal history of other specified conditions (03/27/2018), Prediabetes (12/19/2022), Preglaucoma, unspecified, bilateral (12/19/2022), Presence of intraocular lens (02/15/2018), Presence of intraocular lens (12/19/2022), Right upper quadrant pain (10/14/2021), Right upper quadrant pain (10/14/2021), Strain of unspecified muscle, fascia and tendon at shoulder and upper arm level, right arm, initial encounter (04/06/2016), Systemic lupus erythematosus, unspecified (CMS/[facility]) (12/19/2022), Systemic lupus erythematosus, unspecified (CMS/HCC) (11/29/2017), Type 2 diabetes mellitus without complications (CMS/HCC) (01/04/2023), Type 2 diabetes mellitus without complications (CMS/HCC) (12/13/2017), Type 2 diabetes mellitus without complications (CMS/HCC) (10/31/2017), Unspecified symptoms and signs involving the genitourinary system (09/15/2021), and Unspecified visual field defects (04/27/2016).     Social History   reports that she has never smoked. She has never used smokeless tobacco. She reports current alcohol use. She reports that she does not use drugs.  "    Family History  family history includes Breast cancer in her daughter; COPD in her mother; Colon cancer in her mother; Hodgkin's lymphoma in her brother; Multiple myeloma in her sister; Prostate cancer in her father.     Allergies  Allergies   Allergen Reactions    Acetaminophen Other    Isosorbide Unknown    Other Unknown    Pravastatin Unknown and Nausea Only    Codeine Unknown and Rash    Losartan Rash    Penicillins Unknown and Rash       Medications  Current Outpatient Medications   Medication Instructions    aspirin 81 mg, oral, Daily    ezetimibe (ZETIA) 10 mg, oral, Daily    latanoprost (Xalatan) 0.005 % ophthalmic solution 1 drop, Both Eyes, Nightly    lidocaine (Xylocaine) 5 % ointment 1 Application, Topical, 3 times daily    metFORMIN (Glucophage) 500 mg tablet Take 2 tablets in the morning and 1 tablet in the evening    nitroglycerin (NITROSTAT) 0.4 mg, sublingual, Every 5 min PRN    omeprazole (PriLOSEC) 20 mg DR capsule     OneTouch Ultra Test strip USE TO CHECK BLOOD SUGAR 3 TIMES DAILY        Objective   Visit Vitals  /72 (BP Location: Right arm, Patient Position: Sitting)   Pulse 65   Temp 36.1 °C (97 °F)          8/3/2023    11:11 AM 8/24/2023    10:53 AM 8/28/2023     3:44 PM 10/9/2023     1:15 PM 11/7/2023     8:59 AM 11/22/2023    10:42 AM 2/1/2024     9:02 AM   Vitals   Systolic 146 133  145 143 130 145   Diastolic 72 80  75 77 71 72   Heart Rate 75 69  81 72 74 65   Temp    36.4 °C (97.6 °F)   36.1 °C (97 °F)   Resp 16   18      Height (in) 1.549 m (5' 1\") 1.549 m (5' 1\") 1.549 m (5' 1\") 1.549 m (5' 1\") 1.549 m (5' 1\") 1.549 m (5' 1\") 1.549 m (5' 1\")   Weight (lb) 153.8 153.8 151 156 151 151.4 151   BMI 29.06 kg/m2 29.06 kg/m2 28.53 kg/m2 29.48 kg/m2 28.53 kg/m2 28.61 kg/m2 28.53 kg/m2   BSA (m2) 1.73 m2 1.73 m2 1.72 m2 1.75 m2 1.72 m2 1.72 m2 1.72 m2   Visit Report Report Report  Report Report Report Report     Physical Exam  Vitals reviewed.   Constitutional:       General: She is " awake.      Appearance: Normal appearance.   HENT:      Head: Normocephalic and atraumatic.      Nose: Nose normal.      Mouth/Throat:      Mouth: Mucous membranes are moist.   Eyes:      Pupils: Pupils are equal, round, and reactive to light.   Cardiovascular:      Rate and Rhythm: Normal rate.   Pulmonary:      Effort: Pulmonary effort is normal.   Neurological:      Mental Status: She is alert and oriented to person, place, and time. Mental status is at baseline.   Psychiatric:         Attention and Perception: Attention and perception normal.         Mood and Affect: Mood normal.         Behavior: Behavior normal.         Labs    WBC   Date/Time Value Ref Range Status   10/27/2022 01:37 PM 9.3 4.4 - 11.3 x10E9/L Final     Hemoglobin   Date/Time Value Ref Range Status   10/27/2022 01:37 PM 11.1 (L) 12.0 - 16.0 g/dL Final     Hematocrit   Date/Time Value Ref Range Status   10/27/2022 01:37 PM 32.5 (L) 36.0 - 46.0 % Final     MCV   Date/Time Value Ref Range Status   10/27/2022 01:37 PM 75 (L) 80 - 100 fL Final     Platelets   Date/Time Value Ref Range Status   10/27/2022 01:37  150 - 450 x10E9/L Final        Total Protein   Date/Time Value Ref Range Status   10/10/2023 11:25 AM 8.1 6.4 - 8.2 g/dL Final     Albumin   Date/Time Value Ref Range Status   10/10/2023 11:25 AM 4.7 3.4 - 5.0 g/dL Final     AST   Date/Time Value Ref Range Status   10/27/2022 01:37 PM 14 9 - 39 U/L Final     ALT (SGPT)   Date/Time Value Ref Range Status   10/27/2022 01:37 PM 6 (L) 7 - 45 U/L Final     Comment:      Patients treated with Sulfasalazine may generate    falsely decreased results for ALT.       Alkaline Phosphatase   Date/Time Value Ref Range Status   10/27/2022 01:37 PM 70 33 - 136 U/L Final     Total Bilirubin   Date/Time Value Ref Range Status   10/27/2022 01:37 PM 0.2 0.0 - 1.2 mg/dL Final     Bilirubin, Direct   Date/Time Value Ref Range Status   10/14/2021 02:41 PM 0.1 0.0 - 0.3 mg/dL Final        No results found  "for: \"VITD25\", \"VITAMINA\", \"VTAI\", \"VITEALPHA\", \"VITEGAMMA\"     AST   Date/Time Value Ref Range Status   10/27/2022 01:37 PM 14 9 - 39 U/L Final     ALT (SGPT)   Date/Time Value Ref Range Status   10/27/2022 01:37 PM 6 (L) 7 - 45 U/L Final     Comment:      Patients treated with Sulfasalazine may generate    falsely decreased results for ALT.       Alkaline Phosphatase   Date/Time Value Ref Range Status   10/27/2022 01:37 PM 70 33 - 136 U/L Final     Total Bilirubin   Date/Time Value Ref Range Status   10/27/2022 01:37 PM 0.2 0.0 - 1.2 mg/dL Final     Bilirubin, Direct   Date/Time Value Ref Range Status   10/14/2021 02:41 PM 0.1 0.0 - 0.3 mg/dL Final     Albumin   Date/Time Value Ref Range Status   10/10/2023 11:25 AM 4.7 3.4 - 5.0 g/dL Final     Total Protein   Date/Time Value Ref Range Status   10/10/2023 11:25 AM 8.1 6.4 - 8.2 g/dL Final        Protime   Date/Time Value Ref Range Status   01/26/2022 10:58 AM 10.7 9.8 - 13.4 sec Final     Comment:     Note new reference range as of 11/30/2021 at 10:00am.     INR   Date/Time Value Ref Range Status   01/26/2022 10:58 AM 0.9 0.9 - 1.1 Final       Assessment/Plan   Taylor Richard is a 73 y.o. female who presents to GI/LIver clinic for GERD, and non ulcer dyspepsia. She has a history of HCV infection, treated and cured.    Impression:  Chronic HCV infection, treated with Harvoni with a sustained response. She has been cured from her HCV infection many years ago and has mild liver fibrosis.     She continue to have episodic and regular alcohol consumption. I counselled her regarding harms of alcohol abuse especially given her prior HCV infection.     She does have epigastric discmofort, heartburn, abdominal bloating and distension.      Plan:     Zofran tablets for nausea: not taking and discontinued today.     omeprazole 20 mg daily - will continue, controlled.     We discussed avoiding eating in the late afternoon.     Hydrogen breath test for SIBO : neg (9/23)   "   Discussed alcohol consumption - provided counseling.    Instructions      Seth Yeh MD

## 2024-02-02 ENCOUNTER — LAB (OUTPATIENT)
Dept: LAB | Facility: LAB | Age: 74
End: 2024-02-02
Payer: MEDICARE

## 2024-02-02 DIAGNOSIS — Z86.19 HEPATITIS C VIRUS INFECTION CURED AFTER ANTIVIRAL DRUG THERAPY: ICD-10-CM

## 2024-02-02 DIAGNOSIS — K21.9 GERD WITHOUT ESOPHAGITIS: ICD-10-CM

## 2024-02-02 DIAGNOSIS — Z71.41 ALCOHOL ABUSE COUNSELING AND SURVEILLANCE: ICD-10-CM

## 2024-02-02 LAB
ALBUMIN SERPL BCP-MCNC: 4 G/DL (ref 3.4–5)
ALP SERPL-CCNC: 86 U/L (ref 33–136)
ALT SERPL W P-5'-P-CCNC: 6 U/L (ref 7–45)
AST SERPL W P-5'-P-CCNC: 13 U/L (ref 9–39)
BILIRUB DIRECT SERPL-MCNC: 0 MG/DL (ref 0–0.3)
BILIRUB SERPL-MCNC: 0.2 MG/DL (ref 0–1.2)
PROT SERPL-MCNC: 7.3 G/DL (ref 6.4–8.2)

## 2024-02-02 PROCEDURE — 80076 HEPATIC FUNCTION PANEL: CPT

## 2024-02-02 PROCEDURE — 36415 COLL VENOUS BLD VENIPUNCTURE: CPT

## 2024-02-07 ENCOUNTER — TELEPHONE (OUTPATIENT)
Dept: GASTROENTEROLOGY | Facility: HOSPITAL | Age: 74
End: 2024-02-07
Payer: MEDICARE

## 2024-02-07 DIAGNOSIS — Z86.19 HEPATITIS C VIRUS INFECTION CURED AFTER ANTIVIRAL DRUG THERAPY: ICD-10-CM

## 2024-02-08 ENCOUNTER — OFFICE VISIT (OUTPATIENT)
Dept: PRIMARY CARE | Facility: CLINIC | Age: 74
End: 2024-02-08
Payer: MEDICARE

## 2024-02-08 VITALS — BODY MASS INDEX: 28.13 KG/M2 | HEIGHT: 61 IN | WEIGHT: 149 LBS

## 2024-02-08 DIAGNOSIS — R09.89 RUNNY NOSE: ICD-10-CM

## 2024-02-08 DIAGNOSIS — R31.29 MICROSCOPIC HEMATURIA: ICD-10-CM

## 2024-02-08 DIAGNOSIS — N89.8 VAGINAL ITCHING: Primary | ICD-10-CM

## 2024-02-08 DIAGNOSIS — R82.90 ABNORMAL URINALYSIS: ICD-10-CM

## 2024-02-08 DIAGNOSIS — M75.01 ADHESIVE CAPSULITIS OF RIGHT SHOULDER: ICD-10-CM

## 2024-02-08 DIAGNOSIS — J43.2 CENTRILOBULAR EMPHYSEMA (MULTI): ICD-10-CM

## 2024-02-08 PROBLEM — M25.579 ARTHRALGIA OF ANKLE: Status: ACTIVE | Noted: 2024-02-08

## 2024-02-08 PROBLEM — R11.2 NAUSEA AND VOMITING: Status: ACTIVE | Noted: 2024-02-08

## 2024-02-08 PROBLEM — J38.4 EDEMA OF GLOTTIS: Status: ACTIVE | Noted: 2024-02-08

## 2024-02-08 PROBLEM — K59.00 CONSTIPATION: Status: ACTIVE | Noted: 2024-02-08

## 2024-02-08 PROBLEM — J01.90 ACUTE SINUSITIS: Status: RESOLVED | Noted: 2024-02-08 | Resolved: 2024-02-08

## 2024-02-08 PROBLEM — K40.90 HERNIA, INGUINAL, RIGHT: Status: RESOLVED | Noted: 2023-04-05 | Resolved: 2024-02-08

## 2024-02-08 PROBLEM — N39.0 URINARY TRACT INFECTION: Status: ACTIVE | Noted: 2024-02-08

## 2024-02-08 PROBLEM — M84.30XA STRESS FRACTURE: Status: ACTIVE | Noted: 2024-02-08

## 2024-02-08 PROBLEM — R73.03 PREDIABETES: Status: RESOLVED | Noted: 2024-02-08 | Resolved: 2024-02-08

## 2024-02-08 PROBLEM — M25.562 ACUTE PAIN OF LEFT KNEE: Status: RESOLVED | Noted: 2023-04-05 | Resolved: 2024-02-08

## 2024-02-08 PROBLEM — Z20.822 CONTACT WITH AND (SUSPECTED) EXPOSURE TO COVID-19: Status: RESOLVED | Noted: 2022-10-27 | Resolved: 2024-02-08

## 2024-02-08 PROBLEM — J31.0 CHRONIC RHINITIS: Status: ACTIVE | Noted: 2023-04-05

## 2024-02-08 PROBLEM — R05.9 COUGH: Status: ACTIVE | Noted: 2024-02-08

## 2024-02-08 PROBLEM — D68.61 ANTIPHOSPHOLIPID SYNDROME (MULTI): Status: ACTIVE | Noted: 2022-10-27

## 2024-02-08 PROBLEM — R20.9 COLD EXTREMITIES: Status: ACTIVE | Noted: 2024-02-08

## 2024-02-08 PROBLEM — I71.20 THORACIC AORTIC ANEURYSM (TAA) (CMS-HCC): Status: ACTIVE | Noted: 2024-02-08

## 2024-02-08 PROBLEM — H01.009 BLEPHARITIS: Status: ACTIVE | Noted: 2023-04-05

## 2024-02-08 PROBLEM — N39.0 URINARY TRACT INFECTION: Status: RESOLVED | Noted: 2024-02-08 | Resolved: 2024-02-08

## 2024-02-08 PROBLEM — M25.511 ACUTE PAIN OF RIGHT SHOULDER: Status: RESOLVED | Noted: 2023-07-28 | Resolved: 2024-02-08

## 2024-02-08 PROBLEM — R73.03 PREDIABETES: Status: ACTIVE | Noted: 2024-02-08

## 2024-02-08 PROBLEM — S39.91XA INJURY OF GROIN: Status: RESOLVED | Noted: 2024-02-08 | Resolved: 2024-02-08
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
## 2024-02-08 PROBLEM — R05.9 COUGH: Status: RESOLVED | Noted: 2024-02-08 | Resolved: 2024-02-08

## 2024-02-08 PROBLEM — M79.10 MUSCLE PAIN: Status: ACTIVE | Noted: 2024-02-08

## 2024-02-08 PROBLEM — M79.10 MUSCLE PAIN: Status: RESOLVED | Noted: 2024-02-08 | Resolved: 2024-02-08

## 2024-02-08 PROBLEM — R51.9 DAILY HEADACHE: Status: ACTIVE | Noted: 2024-02-08

## 2024-02-08 PROBLEM — Z20.822 CONTACT WITH AND (SUSPECTED) EXPOSURE TO COVID-19: Status: ACTIVE | Noted: 2022-10-27

## 2024-02-08 PROBLEM — H02.889 MEIBOMIAN GLAND DYSFUNCTION (MGD): Status: ACTIVE | Noted: 2024-02-08

## 2024-02-08 PROBLEM — S39.91XA INJURY OF GROIN: Status: ACTIVE | Noted: 2024-02-08

## 2024-02-08 PROBLEM — M84.30XA STRESS FRACTURE: Status: RESOLVED | Noted: 2024-02-08 | Resolved: 2024-02-08

## 2024-02-08 PROBLEM — R51.9 DAILY HEADACHE: Status: RESOLVED | Noted: 2024-02-08 | Resolved: 2024-02-08

## 2024-02-08 PROBLEM — R11.2 NAUSEA AND VOMITING: Status: RESOLVED | Noted: 2024-02-08 | Resolved: 2024-02-08

## 2024-02-08 PROBLEM — M75.00 ADHESIVE CAPSULITIS OF SHOULDER: Status: ACTIVE | Noted: 2023-07-28

## 2024-02-08 PROBLEM — R06.09 DYSPNEA ON EXERTION: Status: ACTIVE | Noted: 2024-02-08

## 2024-02-08 PROBLEM — J98.4 DISORDER OF LUNG: Status: ACTIVE | Noted: 2024-02-08

## 2024-02-08 PROBLEM — I27.20 PULMONARY HYPERTENSION (MULTI): Status: ACTIVE | Noted: 2022-10-27
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
## 2024-02-08 PROBLEM — I25.10 ARTERIOSCLEROSIS OF CORONARY ARTERY: Status: ACTIVE | Noted: 2023-01-16

## 2024-02-08 PROBLEM — H35.039 HYPERTENSIVE RETINOPATHY: Status: ACTIVE | Noted: 2024-02-08

## 2024-02-08 PROBLEM — J01.90 ACUTE SINUSITIS: Status: ACTIVE | Noted: 2024-02-08

## 2024-02-08 PROBLEM — F10.10 NONDEPENDENT ALCOHOL ABUSE, EPISODIC DRINKING BEHAVIOR: Status: ACTIVE | Noted: 2024-02-08

## 2024-02-08 LAB
POC APPEARANCE, URINE: CLEAR
POC BILIRUBIN, URINE: NEGATIVE
POC BLOOD, URINE: ABNORMAL
POC COLOR, URINE: YELLOW
POC GLUCOSE, URINE: NEGATIVE MG/DL
POC KETONES, URINE: NEGATIVE MG/DL
POC LEUKOCYTES, URINE: ABNORMAL
POC NITRITE,URINE: NEGATIVE
POC PH, URINE: 5.5 PH
POC PROTEIN, URINE: ABNORMAL MG/DL
POC SPECIFIC GRAVITY, URINE: >=1.03
POC UROBILINOGEN, URINE: 0.2 EU/DL

## 2024-02-08 PROCEDURE — 1036F TOBACCO NON-USER: CPT | Performed by: NURSE PRACTITIONER

## 2024-02-08 PROCEDURE — 1126F AMNT PAIN NOTED NONE PRSNT: CPT | Performed by: NURSE PRACTITIONER

## 2024-02-08 PROCEDURE — 81003 URINALYSIS AUTO W/O SCOPE: CPT | Performed by: NURSE PRACTITIONER

## 2024-02-08 PROCEDURE — 1159F MED LIST DOCD IN RCRD: CPT | Performed by: NURSE PRACTITIONER

## 2024-02-08 PROCEDURE — 99214 OFFICE O/P EST MOD 30 MIN: CPT | Performed by: NURSE PRACTITIONER

## 2024-02-08 RX ORDER — CLOTRIMAZOLE AND BETAMETHASONE DIPROPIONATE 10; .64 MG/G; MG/G
1 CREAM TOPICAL 2 TIMES DAILY
Qty: 15 G | Refills: 0 | Status: SHIPPED | OUTPATIENT
Start: 2024-02-08 | End: 2024-04-08

## 2024-02-08 RX ORDER — ALBUTEROL SULFATE 90 UG/1
2 AEROSOL, METERED RESPIRATORY (INHALATION) EVERY 4 HOURS PRN
Qty: 6.7 G | Refills: 5 | Status: SHIPPED | OUTPATIENT
Start: 2024-02-08 | End: 2025-02-07

## 2024-02-08 ASSESSMENT — ENCOUNTER SYMPTOMS
LOSS OF SENSATION IN FEET: 0
DEPRESSION: 0
OCCASIONAL FEELINGS OF UNSTEADINESS: 0

## 2024-02-08 NOTE — TELEPHONE ENCOUNTER
Hepatology Nurse Coordinator Note  Reviewed patient's lab results with Dr. Yeh. Per MD, liver function tests normal and look okay. Patient will need to repeat LFTs in 1 year, with a follow up visit after. Called patient to review. No answer. Left a voicemail message requesting a call back.

## 2024-02-08 NOTE — PROGRESS NOTES
RDPt is here for arm pain, pt has concerns of vaginal itching, ear pain , sinus concern and shoulder pain. Pt requests lipid panel done. Pt would like to discuss lung scan results

## 2024-02-08 NOTE — PROGRESS NOTES
"Subjective   Patient ID: Taylor Richard is a 73 y.o. female who presents for multiple concerns.     HPI     1. Vaginal itching  Continues to have vaginal itching for at least 3 months  Did not seem to get much better with fluconazole  Notices it sometimes when she voids   Has not been using any vaginal hygiene products    2. Ear and sinus concerns  Last month, had a runny nose for a week but had no other symptoms   Took a COVID test and it was negative  Was taking Sudafed  Symptoms have resolved but she wants her nose and ears looked at    3. Shoulder pain  Continues to have right shoulder pain, seems to be worsening  Has refused physical therapy or cortisone injection by orthopedics   She has been doing exercises provided by orthopedics but it hasn't gotten better    4.  Mild emphysema  Concerned because her low dose CT showed mild emphysema and wants this treated  Feels short of breath if she exerts herself or coughing too much  Occurs a handful of times per month     Wanted her lipid and A1c checked but it's too early    Review of Systems  ROS negative except as noted above in HPI.     Objective   Ht 1.549 m (5' 1\")   Wt 67.6 kg (149 lb)   BMI 28.15 kg/m²     Physical Exam  General: Alert and oriented, in no acute distress. Appears stated age, well-nourished, and well hydrated  HEENT:  - Head: Normocephalic and atraumatic   - Eyes: EOMI, PERRLA  - ENT: Hearing grossly intact. Mucus membranes pink and moist without lesions. Tonsils present without swelling or exudates. Good dentition. TMs gray  Heart: RRR. No murmurs, clicks, or rubs  Lungs: Unlabored breathing. CTAB with no crackles, wheezes, or rhonchi  Abdomen: Normal BS in all 4 quadrants. Soft, non-tender, non-distended, with no masses  Extremities: Warm and well perfused. No edema. Normal peripheral pulses  Musculoskeletal: TTP over right glenohumeral joint. ROM intact. Strength 5/5 in BUE and BLE. No joint swelling. Normal gait and station  Neurological: " Alert and oriented. No gross neurological deficits  Psychological: Appropriate mood and affect  Skin: No rash, abnormal lesions, cyanosis, or erythema     Assessment/Plan   1. Vaginal itching  - Did not have much improvement with fluconazole  - Declined pelvic exam  - Prescription sent for lotrisone cream BID  - Advised she will need a pelvic exam if no improvement  - IO UA trace leuks, will send for culture    2. Runny nose  - Had for 1 week last month, symptoms resolved    3. R shoulder pain  - Seems to be getting worse  - Has declined PT and cortisone injections in the past but willing to try PT now    4. Mild emphysema  - As seen on low dose CT   - Has some very occasional shortness of breath  - Prescription sent for albuterol prn    RTC in month as previously scheduled or sooner prn    GISELA Crane-CNP  [facility]

## 2024-02-09 NOTE — TELEPHONE ENCOUNTER
Hepatology Nurse Coordinator Note  Called patient to review their most recent results and recommendations from Dr. Yeh. Patient is aware her liver function tests look ok.    Reviewed the following recommendations with patient from Dr. Yeh.  -Get repeat blood work in 1 year to monitor liver function.  -Follow up in clinic in 1 year, after blood test completed. Patient aware to call around October to schedule her visit.    Patient verbalized understanding of results and recommendations.  Patient has RN coordinators contact information should they have any additional questions, or concerns.

## 2024-02-10 ASSESSMENT — CUP TO DISC RATIO
OD_RATIO: 0.25
OS_RATIO: 0.25

## 2024-02-10 NOTE — PROGRESS NOTES
Glaucoma suspect of both eyesH40.003  -On latanoprost OU QHS - started Jan 2016  -Pachymetry (10/31/17) - 510/520  -OCT RNFL (8/14/23) - SS: 7/10 OD and 6/10 OS. OD: Thin S/I, bord T. OS: Thin S/T. bord I. 62/63. Possible progression OD compared to 2/1/21, but stable from 9/26/22.   -HVF 24-2 (2/12/24) - OD: 13/14FL 3%FP. Scattered nasal, but WNL. OS: 5/16FL 1%FN. Inferonasal depression. Similar to 5/21/18.   -Patient had stopped latanoprost 9/2022 due to foreign body sensation, burning, irritation. Had been taking latanoprost OU since 2016 and not experienced symptoms before. Recommended changing to timolol 0.5% OU qAM, but patient declined to take due to potential side effects (cardiac). Restarted latanoprost OU QHS 12/2022.  -Plan: Discussed importance of compliance with drops and office visits due to risk of permanent vision loss with glaucoma.  Patient has restarted latanoprost OU nightly without issue - reports good compliance.  F/u 6 months - refraction, dilation, OCT RNFL (post dilation).    Combined form of age-related cataract, left eyeH25.812  -Mildly visually significant, but no impact on ADL`s. Monitor for now. F/u 6 months - refraction, dilation, OCT RNFL (post dilation). Plan for Lenstar/Pentacam/OCT macula only if cataract(s) visually significant and if patient would like to proceed with cataract surgery.      Meibomian gland dysfunction (MGD)H02.89  Blepharitis of both eyesH01.003  -FBS, irritation, redness, itching OS>OD  -History of eyelids and eyebrows itchy like there are mites in them - likely some dryness and blepharitis -use erythromycin ophthalmic ointment.  -Continue warm compresses 1-2x/day, baby shampoo lid wash daily or iVizia wipes (samples given), artificial tears 2-4x/day. Can consider xdemvy as needed.     PVD (posterior vitreous detachment), right eyeH43.811  - Stable continue to monitor.     Pseudophakia of right eyeZ96.1 - 12/7/17  -Doing well. Good vision. IOP good.      Pre-[id number]  Hypertensive retinopathy of both eyesH35.033  -OCT macula (8/14/23) - SS: 9/10 OD and 7/10 OS. Normal thickness and contour OU. Intact IS-OS OU. No edema OU. 255/251. Stable from 12/19/22.   -HbA1c= 7.1 (11/22/23 POCT). On Metformin. No diabetic retinopathy seen. Continue close monitoring of blood glucose, blood pressure, and cholesterol. Plan for annual dilated eye exam. Advised smoking cessation.     Myopia of both eyes with astigmatism and [id number]  -New Rx given per patient request. May have limited improvement secondary to cataracts, however OS cataract is not yet visually significant enough for cataract surgery.  Myopic shift may continue as cataract worsens, but will hold off on cataract surgery for now.

## 2024-02-12 ENCOUNTER — OFFICE VISIT (OUTPATIENT)
Dept: OPHTHALMOLOGY | Facility: CLINIC | Age: 74
End: 2024-02-12
Payer: MEDICARE

## 2024-02-12 DIAGNOSIS — H25.812 COMBINED FORM OF AGE-RELATED CATARACT, LEFT EYE: ICD-10-CM

## 2024-02-12 DIAGNOSIS — Z96.1 PSEUDOPHAKIA: ICD-10-CM

## 2024-02-12 DIAGNOSIS — R73.03 PREDIABETES: ICD-10-CM

## 2024-02-12 DIAGNOSIS — H43.811 PVD (POSTERIOR VITREOUS DETACHMENT), RIGHT EYE: ICD-10-CM

## 2024-02-12 DIAGNOSIS — H35.033 HYPERTENSIVE RETINOPATHY OF BOTH EYES: ICD-10-CM

## 2024-02-12 DIAGNOSIS — H01.004 BLEPHARITIS OF UPPER EYELIDS OF BOTH EYES, UNSPECIFIED TYPE: ICD-10-CM

## 2024-02-12 DIAGNOSIS — H52.4 PRESBYOPIA: ICD-10-CM

## 2024-02-12 DIAGNOSIS — H52.203 ASTIGMATISM OF BOTH EYES, UNSPECIFIED TYPE: ICD-10-CM

## 2024-02-12 DIAGNOSIS — H02.889 MEIBOMIAN GLAND DYSFUNCTION: ICD-10-CM

## 2024-02-12 DIAGNOSIS — H52.13 MYOPIA OF BOTH EYES: ICD-10-CM

## 2024-02-12 DIAGNOSIS — H01.001 BLEPHARITIS OF UPPER EYELIDS OF BOTH EYES, UNSPECIFIED TYPE: ICD-10-CM

## 2024-02-12 DIAGNOSIS — H40.003 GLAUCOMA SUSPECT OF BOTH EYES: Primary | ICD-10-CM

## 2024-02-12 PROCEDURE — 92015 DETERMINE REFRACTIVE STATE: CPT | Performed by: OPHTHALMOLOGY

## 2024-02-12 PROCEDURE — 99213 OFFICE O/P EST LOW 20 MIN: CPT | Performed by: OPHTHALMOLOGY

## 2024-02-12 PROCEDURE — 92083 EXTENDED VISUAL FIELD XM: CPT | Performed by: OPHTHALMOLOGY

## 2024-02-12 ASSESSMENT — VISUAL ACUITY
OD_CC: 20/20
OS_BAT_MED: 20/30
OS_CC: 20/25
METHOD: SNELLEN - LINEAR

## 2024-02-12 ASSESSMENT — REFRACTION_MANIFEST
OS_CYLINDER: -1.50
OD_SPHERE: -2.25
OS_ADD: +2.50
OD_ADD: +2.50
OD_CYLINDER: -0.50
OS_AXIS: 085
OS_SPHERE: -3.00
OD_AXIS: 090

## 2024-02-12 ASSESSMENT — PACHYMETRY
OS_CT(UM): 520
OD_CT(UM): 510

## 2024-02-12 ASSESSMENT — REFRACTION_WEARINGRX
OD_SPHERE: -2.50
OS_AXIS: 085
OD_ADD: +2.50
OS_ADD: +2.50
OS_SPHERE: -3.00
OD_CYLINDER: -0.50
OD_AXIS: 090
OS_CYLINDER: -1.50

## 2024-02-12 ASSESSMENT — TONOMETRY
OS_IOP_MMHG: 18
IOP_METHOD: GOLDMANN APPLANATION
OD_IOP_MMHG: 19
OS_IOP_MMHG: 13
OD_IOP_MMHG: 13
IOP_METHOD: GOLDMANN APPLANATION

## 2024-02-12 ASSESSMENT — ENCOUNTER SYMPTOMS: EYES NEGATIVE: 1

## 2024-02-14 ENCOUNTER — TELEPHONE (OUTPATIENT)
Dept: PRIMARY CARE | Facility: CLINIC | Age: 74
End: 2024-02-14
Payer: MEDICARE

## 2024-02-21 ENCOUNTER — CLINICAL SUPPORT (OUTPATIENT)
Dept: AUDIOLOGY | Facility: CLINIC | Age: 74
End: 2024-02-21
Payer: MEDICARE

## 2024-02-21 DIAGNOSIS — H90.3 SENSORINEURAL HEARING LOSS (SNHL) OF BOTH EARS: Primary | ICD-10-CM

## 2024-02-21 PROCEDURE — 92557 COMPREHENSIVE HEARING TEST: CPT | Performed by: AUDIOLOGIST

## 2024-02-21 PROCEDURE — 92550 TYMPANOMETRY & REFLEX THRESH: CPT | Performed by: AUDIOLOGIST

## 2024-02-21 ASSESSMENT — PAIN - FUNCTIONAL ASSESSMENT: PAIN_FUNCTIONAL_ASSESSMENT: 0-10

## 2024-02-21 ASSESSMENT — PAIN SCALES - GENERAL: PAINLEVEL_OUTOF10: 0 - NO PAIN

## 2024-02-21 NOTE — PROGRESS NOTES
"HISTORY:  Annual hearing evaluation.    She feels that her hearing is worse since her previous hearing test on 2/17/2023  She states that her ears are very itchy \"way down in there\".    No sinus or allergy problems.    No pain  No aural fullness  No tinnitus  No vertigo  No falls.      RESULTS:  Otoscopic inspection showed clear ear canals bilaterally.    Immittance testing showed normal tympanograms bilaterally.   Ipsilateral acoustic reflexes were tested at 500-4000Hz in both ears.  They were present at 500-2000Hz in both ears.    Pure tone air and bone conduction testing showed a mild hearing loss at 125-1000Hz sloping to a severe sensorineural eharing loss at 8000Hz in both ears.    Word discrimination scores were excellent bilaterally.    IMPRESSIONS:  Mrs. Richard has a mild to severe sensorineural hearing loss with excellent word discrimination scores.  She is a candidate for hearing aids in both ears.  We are not in network with her insurance for hearing aids.  She was given a copy of her hearing test today to take with her for the purchase of hearing aids.  She said she is aware of an outside facility that she can go to.  She was seen there last year but ultimately did not purchase hearing aids at that time.     Treatment Plan:   Bilateral hearing aids.   Annual hearing evaluations.      TIME:  [phone]            "

## 2024-02-22 PROBLEM — J43.2 CENTRILOBULAR EMPHYSEMA (MULTI): Status: ACTIVE | Noted: 2024-02-22

## 2024-02-22 PROBLEM — J34.89 NASAL DISCHARGE: Status: ACTIVE | Noted: 2024-02-22

## 2024-02-23 ENCOUNTER — OFFICE VISIT (OUTPATIENT)
Dept: CARDIOLOGY | Facility: CLINIC | Age: 74
End: 2024-02-23
Payer: MEDICARE

## 2024-02-23 VITALS
DIASTOLIC BLOOD PRESSURE: 77 MMHG | BODY MASS INDEX: 28.01 KG/M2 | HEIGHT: 61 IN | WEIGHT: 148.38 LBS | HEART RATE: 66 BPM | SYSTOLIC BLOOD PRESSURE: 120 MMHG | OXYGEN SATURATION: 95 %

## 2024-02-23 DIAGNOSIS — E78.5 HYPERLIPIDEMIA, UNSPECIFIED HYPERLIPIDEMIA TYPE: ICD-10-CM

## 2024-02-23 DIAGNOSIS — I10 BENIGN ESSENTIAL HYPERTENSION: ICD-10-CM

## 2024-02-23 DIAGNOSIS — Z98.61 CAD S/P PERCUTANEOUS CORONARY ANGIOPLASTY: ICD-10-CM

## 2024-02-23 DIAGNOSIS — I70.0 ATHEROSCLEROSIS OF AORTA (CMS-HCC): Primary | ICD-10-CM

## 2024-02-23 DIAGNOSIS — I25.10 CAD S/P PERCUTANEOUS CORONARY ANGIOPLASTY: ICD-10-CM

## 2024-02-23 PROBLEM — R06.09 DYSPNEA ON EXERTION: Status: RESOLVED | Noted: 2024-02-08 | Resolved: 2024-02-23

## 2024-02-23 PROBLEM — I27.20 PULMONARY HYPERTENSION (MULTI): Status: RESOLVED | Noted: 2022-10-27 | Resolved: 2024-02-23

## 2024-02-23 PROBLEM — I71.20 THORACIC AORTIC ANEURYSM (TAA) (CMS-HCC): Status: RESOLVED | Noted: 2024-02-08 | Resolved: 2024-02-23

## 2024-02-23 PROBLEM — I73.00 RAYNAUDS PHENOMENON: Status: RESOLVED | Noted: 2023-04-05 | Resolved: 2024-02-23

## 2024-02-23 PROCEDURE — 1126F AMNT PAIN NOTED NONE PRSNT: CPT | Performed by: INTERNAL MEDICINE

## 2024-02-23 PROCEDURE — 1160F RVW MEDS BY RX/DR IN RCRD: CPT | Performed by: INTERNAL MEDICINE

## 2024-02-23 PROCEDURE — 3078F DIAST BP <80 MM HG: CPT | Performed by: INTERNAL MEDICINE

## 2024-02-23 PROCEDURE — 3074F SYST BP LT 130 MM HG: CPT | Performed by: INTERNAL MEDICINE

## 2024-02-23 PROCEDURE — 99214 OFFICE O/P EST MOD 30 MIN: CPT | Performed by: INTERNAL MEDICINE

## 2024-02-23 PROCEDURE — 1036F TOBACCO NON-USER: CPT | Performed by: INTERNAL MEDICINE

## 2024-02-23 PROCEDURE — 1159F MED LIST DOCD IN RCRD: CPT | Performed by: INTERNAL MEDICINE

## 2024-02-23 RX ORDER — EZETIMIBE 10 MG/1
10 TABLET ORAL DAILY
Qty: 90 TABLET | Refills: 3 | Status: SHIPPED | OUTPATIENT
Start: 2024-02-23 | End: 2025-02-22

## 2024-02-23 ASSESSMENT — ENCOUNTER SYMPTOMS
FEVER: 0
OCCASIONAL FEELINGS OF UNSTEADINESS: 0
CONSTIPATION: 0
ABDOMINAL PAIN: 0
DEPRESSION: 0
MEMORY LOSS: 0
WHEEZING: 0
COUGH: 0
LOSS OF SENSATION IN FEET: 0
BLOATING: 0
ALTERED MENTAL STATUS: 0
NAUSEA: 0
CHILLS: 0
DIARRHEA: 0
HEADACHES: 0
VOMITING: 0
MYALGIAS: 0
FALLS: 0
HEMATURIA: 0
DYSURIA: 0
HEMOPTYSIS: 0

## 2024-02-23 ASSESSMENT — PAIN SCALES - GENERAL: PAINLEVEL: 0-NO PAIN

## 2024-02-23 ASSESSMENT — PATIENT HEALTH QUESTIONNAIRE - PHQ9
2. FEELING DOWN, DEPRESSED OR HOPELESS: NOT AT ALL
SUM OF ALL RESPONSES TO PHQ9 QUESTIONS 1 AND 2: 0
1. LITTLE INTEREST OR PLEASURE IN DOING THINGS: NOT AT ALL

## 2024-02-23 ASSESSMENT — COLUMBIA-SUICIDE SEVERITY RATING SCALE - C-SSRS
6. HAVE YOU EVER DONE ANYTHING, STARTED TO DO ANYTHING, OR PREPARED TO DO ANYTHING TO END YOUR LIFE?: NO
2. HAVE YOU ACTUALLY HAD ANY THOUGHTS OF KILLING YOURSELF?: NO
1. IN THE PAST MONTH, HAVE YOU WISHED YOU WERE DEAD OR WISHED YOU COULD GO TO SLEEP AND NOT WAKE UP?: NO

## 2024-02-23 NOTE — PROGRESS NOTES
Chief complaint:  FU     HPI  74 yo BF w/ h/o CAD s/p OM stent 2/22, athero of Ao, HTN, DM, GERD, TOB (quit) now on here for cardiology f/u. No recent CP (prior rare mid CP at rest that resolves with SLTNG in ~5 minutes, no radiation, occ mild assoc dyspnea).  No dyspnea at rest. No HAHN. No orthopnea/PND. No palps. No further LH/dizziness (off Quinapril). No syncope. No edema. No claudication. +occ cough.  3/18/22 ED for CP at rest x hours (no radiation, no assoc symptoms); no change w/ SLTNG; gone with Toradol -> ECG/TPN neg.   10/22 ED for CP -> LUE at rest x 2 hrs -> ECG/TPN neg  ECG 4/20: SR (66), nonsp ST changes  ECG 7/21: SR (95), minor nonsp ST changes  ECG 2/22: SR (74), minor nonsp ST-T changes  ECG 3/22: SR (73), normal  ECG 10/22: SR (71)  ECG 11/22: SR (61)  ECG 12/23: SR, minor nonsp ST-T changes  Echo 4/20: EF 55%, DD, valves ok, PASP 26  WINSOME 5/18: no ischemia, EF 55-60% -> 65-70%  Nuc 4/20: no ischemia/scar, EF >65%  WINSOME 11/22: no ischemia, EF 60-65% -> 75%  WINSOME 12/23: no ischemia, EF 60% -> 70%, mild chest pressure  CTA cors 2/19: LM plaque, pLAD 50% (FFR 0.8), m-dLAD 25%, D1 25%, mLCx 50-70% (FFR neg), OM1 50%, mRCA 50-70% (FFR 0.79), mild-mod athero of Ao  Cath 2/19: LM ok, p-mLAD 10-30%, mLAD 30%, mLCx 50%, mRCA 60%, dRCA 40%, LVEDP 23, no AS  Cath 2/22: LM ok, p-mLAD 40%, p-mOM1 95% (Synergy NISHA), p-mOM2 60%, m-dRCA 60%, dRCA 60%, EF 60%, no AS, no MR  CXR 4/20: no acute abnl  CXR 1/22: no acute abnl  PFT 5/19: no obst, no rest, mild red DLCO  CT chest 1/21: mod-sev 3v cor calc, no chamb enlarge, no peric eff, no aneurysm, mild athero of Ao, mean PA 3.2cm  CT chest 1/22: sev cor calc, nl heart size, no peric eff, athero of Ao, no aneurysm, nl PA  CT chest 1/24: sev CAC, nl heart size, no peric eff, mild AA, no aneurysm, nl PA  US Ao 7/19: no AAA  SHAWANDA 2/19: R PT 1.09 DP 0.97, L PT 1.14 DP 0.94     Review of Systems   Constitutional: Negative for chills, fever and malaise/fatigue.   HENT:   Negative for hearing loss.    Eyes:  Negative for visual disturbance.   Respiratory:  Negative for cough, hemoptysis and wheezing.    Skin:  Negative for rash.   Musculoskeletal:  Negative for falls and myalgias.   Gastrointestinal:  Negative for bloating, abdominal pain, constipation, diarrhea, dysphagia, nausea and vomiting.   Genitourinary:  Negative for dysuria and hematuria.   Neurological:  Negative for headaches.   Psychiatric/Behavioral:  Negative for altered mental status, depression and memory loss.       Social History     Tobacco Use    Smoking status: Never    Smokeless tobacco: Never   Substance Use Topics    Alcohol use: Yes     Comment: once every other day: wine      Family History   Problem Relation Name Age of Onset    COPD Mother      Colon cancer Mother      Prostate cancer Father      Multiple myeloma Sister      Hodgkin's lymphoma Brother      Breast cancer Daughter        Allergies   Allergen Reactions    Acetaminophen Other    Isosorbide Unknown    Other Unknown    Pravastatin Unknown and Nausea Only    Codeine Unknown and Rash    Losartan Rash    Penicillins Unknown and Rash      Current Outpatient Medications   Medication Instructions    albuterol (Proventil HFA) 90 mcg/actuation inhaler 2 puffs, inhalation, Every 4 hours PRN    aspirin 81 mg, oral, Daily    clotrimazole-betamethasone (Lotrisone) cream 1 Application, Topical, 2 times daily    ezetimibe (ZETIA) 10 mg, oral, Daily    latanoprost (Xalatan) 0.005 % ophthalmic solution 1 drop, Both Eyes, Nightly    lidocaine (Xylocaine) 5 % ointment 1 Application, Topical, 3 times daily    metFORMIN (Glucophage) 500 mg tablet Take 2 tablets in the morning and 1 tablet in the evening    nitroglycerin (NITROSTAT) 0.4 mg, sublingual, Every 5 min PRN    omeprazole (PriLOSEC) 20 mg DR capsule     OneTouch Ultra Test strip USE TO CHECK BLOOD SUGAR 3 TIMES DAILY      Vitals:    02/23/24 0917   BP: 120/77   Pulse: 66   SpO2: 95%      Physical  Exam  Constitutional:       Appearance: Normal appearance.   HENT:      Head: Normocephalic and atraumatic.      Nose: Nose normal.   Neck:      Vascular: No carotid bruit.   Cardiovascular:      Rate and Rhythm: Normal rate and regular rhythm.      Heart sounds: No murmur heard.  Pulmonary:      Effort: Pulmonary effort is normal.      Breath sounds: Normal breath sounds.   Abdominal:      Palpations: Abdomen is soft.      Tenderness: There is no abdominal tenderness.   Musculoskeletal:      Right lower leg: No edema.      Left lower leg: No edema.   Skin:     General: Skin is warm and dry.   Neurological:      General: No focal deficit present.      Mental Status: She is alert.   Psychiatric:         Mood and Affect: Mood normal.         Judgment: Judgment normal.        Results/Data  10/23 Cr 0.89, K 4.3  5/23 Cr 0.77, K 4.4, LDL 86, HDL 44, , Chol 153  10/22 Cr 0.94, K 4.4, LFT nl, HGB 11.1, , hgba1c 6.0, TPN neg  8/22 Cr 0.79, K 4.6, LFT nl, LDL 67, HDL 43, , Chol 148, HGB 11.2,   6/22 hgba1c 5.8  3/22 Cr 0.9, K 6.4 (hemolyzed), LFT nl, HGB 9.7, , TPN neg, BNP 29  1/22 Cr 0.94, K 4.5, HGB 10.6,   12/21 Cr 0.95, K 4.8, LFT nl, , HDL 40, TG 96, Chol 163, HGB 10.4, , hgba1c 5.6  5/21 Cr 0.96, K 4.6, LFT nl, , HDL 43, , Chol 174, hgba1c 6.0  10/20 Cr 1.01, K 4.5, LFT nl, hgba1c 5.8, TSH 2.05  4/20 LDL 84, HDL 40, , Chol 144, HGB 10.6,      Assessment/Plan   74 yo BF w/ h/o CAD s/p OM stent 2/22, athero of Ao, HTN, DM, GERD, TOB (quit). Doing well. Prior rare CP of unclear etiology. WINSOME 12/23 normal.   She cannot tolerate statins (constipation/nausea), Imdur (hives), nor BB (fatigue/hair loss).  -continue ASA 81 qd (with food)  -continue Zetia 10 qd (constipation/nausea on Atorva 10; she claims cannot tolerate any statin and will not retry; may need PCSK [she is reluctant]; Cholestyramine was too expensive) -> goal LDL <70, TG <150  -she  is a very difficult stick for blood draws  -f/u 9 months (earlier if needed)     Aung Blandon MD

## 2024-03-13 ENCOUNTER — TELEPHONE (OUTPATIENT)
Dept: GASTROENTEROLOGY | Facility: HOSPITAL | Age: 74
End: 2024-03-13
Payer: MEDICARE

## 2024-03-21 ENCOUNTER — OFFICE VISIT (OUTPATIENT)
Dept: PRIMARY CARE | Facility: CLINIC | Age: 74
End: 2024-03-21
Payer: MEDICARE

## 2024-03-21 VITALS
SYSTOLIC BLOOD PRESSURE: 130 MMHG | HEIGHT: 61 IN | WEIGHT: 150 LBS | BODY MASS INDEX: 28.32 KG/M2 | DIASTOLIC BLOOD PRESSURE: 64 MMHG | HEART RATE: 80 BPM

## 2024-03-21 DIAGNOSIS — R53.83 FATIGUE, UNSPECIFIED TYPE: Primary | ICD-10-CM

## 2024-03-21 DIAGNOSIS — I25.10 CAD S/P PERCUTANEOUS CORONARY ANGIOPLASTY: ICD-10-CM

## 2024-03-21 DIAGNOSIS — J43.2 CENTRILOBULAR EMPHYSEMA (MULTI): ICD-10-CM

## 2024-03-21 DIAGNOSIS — R10.2 PELVIC PAIN: ICD-10-CM

## 2024-03-21 DIAGNOSIS — E11.9 TYPE 2 DIABETES MELLITUS WITHOUT COMPLICATION, WITHOUT LONG-TERM CURRENT USE OF INSULIN (MULTI): ICD-10-CM

## 2024-03-21 DIAGNOSIS — E83.52 HYPERCALCEMIA: ICD-10-CM

## 2024-03-21 DIAGNOSIS — D64.9 ANEMIA, UNSPECIFIED TYPE: ICD-10-CM

## 2024-03-21 DIAGNOSIS — R31.9 HEMATURIA, UNSPECIFIED TYPE: ICD-10-CM

## 2024-03-21 DIAGNOSIS — M25.572 ACUTE LEFT ANKLE PAIN: ICD-10-CM

## 2024-03-21 DIAGNOSIS — Z98.61 CAD S/P PERCUTANEOUS CORONARY ANGIOPLASTY: ICD-10-CM

## 2024-03-21 DIAGNOSIS — I10 BENIGN ESSENTIAL HYPERTENSION: ICD-10-CM

## 2024-03-21 PROBLEM — R73.03 PREDIABETES: Status: RESOLVED | Noted: 2024-02-08 | Resolved: 2024-03-21

## 2024-03-21 LAB
BASOPHILS # BLD AUTO: 0.08 X10*3/UL (ref 0–0.1)
BASOPHILS NFR BLD AUTO: 0.8 %
CREAT UR-MCNC: 162.3 MG/DL (ref 20–320)
EOSINOPHIL # BLD AUTO: 0.3 X10*3/UL (ref 0–0.4)
EOSINOPHIL NFR BLD AUTO: 3.1 %
ERYTHROCYTE [DISTWIDTH] IN BLOOD BY AUTOMATED COUNT: 15 % (ref 11.5–14.5)
HCT VFR BLD AUTO: 34.5 % (ref 36–46)
HGB BLD-MCNC: 11.1 G/DL (ref 12–16)
IMM GRANULOCYTES # BLD AUTO: 0.02 X10*3/UL (ref 0–0.5)
IMM GRANULOCYTES NFR BLD AUTO: 0.2 % (ref 0–0.9)
LYMPHOCYTES # BLD AUTO: 4.3 X10*3/UL (ref 0.8–3)
LYMPHOCYTES NFR BLD AUTO: 45.1 %
MCH RBC QN AUTO: 24.7 PG (ref 26–34)
MCHC RBC AUTO-ENTMCNC: 32.2 G/DL (ref 32–36)
MCV RBC AUTO: 77 FL (ref 80–100)
MICROALBUMIN UR-MCNC: 42.7 MG/L
MICROALBUMIN/CREAT UR: 26.3 UG/MG CREAT
MONOCYTES # BLD AUTO: 0.46 X10*3/UL (ref 0.05–0.8)
MONOCYTES NFR BLD AUTO: 4.8 %
NEUTROPHILS # BLD AUTO: 4.38 X10*3/UL (ref 1.6–5.5)
NEUTROPHILS NFR BLD AUTO: 46 %
NRBC BLD-RTO: 0 /100 WBCS (ref 0–0)
PLATELET # BLD AUTO: 207 X10*3/UL (ref 150–450)
POC APPEARANCE, URINE: CLEAR
POC BILIRUBIN, URINE: NEGATIVE
POC BLOOD, URINE: NEGATIVE
POC COLOR, URINE: YELLOW
POC GLUCOSE, URINE: NEGATIVE MG/DL
POC HEMOGLOBIN A1C: 6.5 % (ref 4.2–6.5)
POC KETONES, URINE: NEGATIVE MG/DL
POC LEUKOCYTES, URINE: NEGATIVE
POC NITRITE,URINE: NEGATIVE
POC PH, URINE: 5 PH
POC PROTEIN, URINE: NEGATIVE MG/DL
POC SPECIFIC GRAVITY, URINE: 1.02
POC UROBILINOGEN, URINE: 0.2 EU/DL
RBC # BLD AUTO: 4.49 X10*6/UL (ref 4–5.2)
WBC # BLD AUTO: 9.5 X10*3/UL (ref 4.4–11.3)

## 2024-03-21 PROCEDURE — 84443 ASSAY THYROID STIM HORMONE: CPT

## 2024-03-21 PROCEDURE — 82043 UR ALBUMIN QUANTITATIVE: CPT

## 2024-03-21 PROCEDURE — 82728 ASSAY OF FERRITIN: CPT

## 2024-03-21 PROCEDURE — 36415 COLL VENOUS BLD VENIPUNCTURE: CPT

## 2024-03-21 PROCEDURE — 80053 COMPREHEN METABOLIC PANEL: CPT

## 2024-03-21 PROCEDURE — 82570 ASSAY OF URINE CREATININE: CPT

## 2024-03-21 PROCEDURE — 82306 VITAMIN D 25 HYDROXY: CPT

## 2024-03-21 PROCEDURE — 83550 IRON BINDING TEST: CPT

## 2024-03-21 PROCEDURE — 1159F MED LIST DOCD IN RCRD: CPT | Performed by: NURSE PRACTITIONER

## 2024-03-21 PROCEDURE — 3075F SYST BP GE 130 - 139MM HG: CPT | Performed by: NURSE PRACTITIONER

## 2024-03-21 PROCEDURE — 83540 ASSAY OF IRON: CPT

## 2024-03-21 PROCEDURE — 1036F TOBACCO NON-USER: CPT | Performed by: NURSE PRACTITIONER

## 2024-03-21 PROCEDURE — 99214 OFFICE O/P EST MOD 30 MIN: CPT | Performed by: NURSE PRACTITIONER

## 2024-03-21 PROCEDURE — 85025 COMPLETE CBC W/AUTO DIFF WBC: CPT

## 2024-03-21 PROCEDURE — 3078F DIAST BP <80 MM HG: CPT | Performed by: NURSE PRACTITIONER

## 2024-03-21 PROCEDURE — 83036 HEMOGLOBIN GLYCOSYLATED A1C: CPT | Performed by: NURSE PRACTITIONER

## 2024-03-21 PROCEDURE — 1160F RVW MEDS BY RX/DR IN RCRD: CPT | Performed by: NURSE PRACTITIONER

## 2024-03-21 PROCEDURE — 81003 URINALYSIS AUTO W/O SCOPE: CPT | Performed by: NURSE PRACTITIONER

## 2024-03-21 PROCEDURE — 87086 URINE CULTURE/COLONY COUNT: CPT

## 2024-03-21 ASSESSMENT — ENCOUNTER SYMPTOMS
OCCASIONAL FEELINGS OF UNSTEADINESS: 0
LOSS OF SENSATION IN FEET: 0
DEPRESSION: 0

## 2024-03-21 NOTE — PROGRESS NOTES
Subjective   Patient ID: Taylor Richard is a 73 y.o. female who presents for Follow-up.    HPI     1. T2DM  Last HgA1c: 7.1% on 8/3/23  Current meds: metformin 1000 mg QAM and 500 mg QPM  Home glucose monitoring: every morning, running 110s-140s   Diet: fair, has followed with nutritionist  Exercise: occasional walking  Statin: none, pravastatin caused chest pain - on Zetia  ACEI: none  ASA: yes, 81 mg QD  Last urine albumin: 1/4/23  Diabetic foot exam: 11/22/23  Vision exam: 2/12/24  Dental cleaning: Wears dentures   Pneumovax: 2020     Denies polyuria, polydipsia, vision changes, neuropathy, recurrent yeast infections, or hypoglycemic episodes.      2. Hypertension  3. CAD  Current meds: Zetia 10 mg every day, nitroglycerin prn  Home blood pressure monitoring: every day, running 130s/70-80  Salt intake: limits   Caffeine intake: occasional tea  Diet: as noted above  Exercise: as noted above  Alcohol use: 1 pint of wine every other day  Tobacco use: none  Illicit drug use: none     Denies vision changes, headaches, dizziness, chest pain, palpitations, or edema    4. Emphysema  Using albuterol about 2-3 times per week when she feels she's not getting enough air     5. Pelvic pain  Has been present for about a week   Getting the pain every night   Describes the pain as tight   Feels she's been urinating more frequently, voids 4-5 times per day  Denies incomplete bladder emptying, dysuria, hesitancy  She does not some urinary incontinence with coughing or sneezing but this has been going on for about 6-8 months     6. Fatigue  Has been tired for the last 2-3 weeks  Falls asleep easily on the couch in the afternoon     7. L ankle pain  Reports 2 week history of L ankle pain  Denies any injury or trauma but noticed the pain when she stepped out of the car  Has taken old meloxicam and it helped, states she didn't want to take an NSAID, didn't realized this was an NSAID  Has not tried ice or heat   Pain seems to be  improving     Review of Systems  ROS negative except as noted above in HPI.     Objective   There were no vitals taken for this visit.    Physical Exam  General: Alert and oriented, in no acute distress. Appears stated age, well-nourished, and well hydrated  HEENT:  - Head: Normocephalic and atraumatic   - Eyes: EOMI, PERRLA  - ENT: Hearing grossly intact  Heart: RRR. No murmurs, clicks, or rubs  Lungs: Unlabored breathing. CTAB with no crackles, wheezes, or rhonchi  Abdomen: Normal BS in all 4 quadrants. Soft, non-tender, non-distended, with no masses  Extremities: Warm and well perfused. No edema. Normal peripheral pulses  Musculoskeletal: TTP of lateral aspect of L ankle. No redness or swelling. Normal gait and station  Neurological: Alert and oriented. No gross neurological deficits  Psychological: Appropriate mood and affect  Skin: No rash, abnormal lesions, cyanosis, or erythema      Assessment/Plan   1. T2DM  - IO HgA1c: 6.5%  - Continue metformin 1000 mg QAM and 500 mg QPM  - Counseled on diet, follow up with dietician  - Continue to check glucose at home every day  - Urine albumin collected  - PPSV23 up to date  - Monofilament exam up to date  - Encouraged yearly retinal eye exams and twice yearly dental cleanings  - Counseled on lifestyle management     2. Hypertension  3. CAD  - BP controlled  - Continue ASA 81 mg every day, Zetia 10 mg every day, and nitroglycerin prn  - Continue to check BP at home every day  - Counseled on lifestyle management    4. Emphysema  - Well controlled  - Continue albuterol prn, using 2-3 times per week    5. Pelvic pain  - IO UA negative but will send for urine culture    6. Fatigue  - Check CBCd, CMP, TSH, vit D    7.  L ankle pain  - Likely sprain  - RICE therapy    RTC in 3 months for chronic care or sooner prn    GISELA Crane-CNP  [facility]

## 2024-03-22 LAB
25(OH)D3 SERPL-MCNC: 11 NG/ML (ref 30–100)
ALBUMIN SERPL BCP-MCNC: 4.3 G/DL (ref 3.4–5)
ALP SERPL-CCNC: 84 U/L (ref 33–136)
ALT SERPL W P-5'-P-CCNC: 7 U/L (ref 7–45)
ANION GAP SERPL CALC-SCNC: 17 MMOL/L (ref 10–20)
AST SERPL W P-5'-P-CCNC: 14 U/L (ref 9–39)
BILIRUB SERPL-MCNC: 0.2 MG/DL (ref 0–1.2)
BUN SERPL-MCNC: 14 MG/DL (ref 6–23)
CALCIUM SERPL-MCNC: 9.8 MG/DL (ref 8.6–10.6)
CHLORIDE SERPL-SCNC: 104 MMOL/L (ref 98–107)
CO2 SERPL-SCNC: 24 MMOL/L (ref 21–32)
CREAT SERPL-MCNC: 0.85 MG/DL (ref 0.5–1.05)
EGFRCR SERPLBLD CKD-EPI 2021: 72 ML/MIN/1.73M*2
GLUCOSE SERPL-MCNC: 128 MG/DL (ref 74–99)
POTASSIUM SERPL-SCNC: 4.5 MMOL/L (ref 3.5–5.3)
PROT SERPL-MCNC: 7.4 G/DL (ref 6.4–8.2)
SODIUM SERPL-SCNC: 140 MMOL/L (ref 136–145)
TSH SERPL-ACNC: 1.06 MIU/L (ref 0.44–3.98)

## 2024-03-23 LAB — BACTERIA UR CULT: NO GROWTH

## 2024-03-26 DIAGNOSIS — D64.9 ANEMIA, UNSPECIFIED TYPE: Primary | ICD-10-CM

## 2024-03-26 DIAGNOSIS — R79.89 ABNORMAL CBC: Primary | ICD-10-CM

## 2024-03-26 DIAGNOSIS — E55.9 VITAMIN D DEFICIENCY: ICD-10-CM

## 2024-03-26 LAB
FERRITIN SERPL-MCNC: 49 NG/ML (ref 8–150)
IRON SATN MFR SERPL: 15 % (ref 25–45)
IRON SERPL-MCNC: 53 UG/DL (ref 35–150)
TIBC SERPL-MCNC: 362 UG/DL (ref 240–445)
UIBC SERPL-MCNC: 309 UG/DL (ref 110–370)

## 2024-03-26 RX ORDER — ERGOCALCIFEROL 1.25 MG/1
50000 CAPSULE ORAL
Qty: 12 CAPSULE | Refills: 0 | Status: SHIPPED | OUTPATIENT
Start: 2024-03-26 | End: 2024-06-18

## 2024-03-28 DIAGNOSIS — D50.9 IRON DEFICIENCY ANEMIA, UNSPECIFIED IRON DEFICIENCY ANEMIA TYPE: Primary | ICD-10-CM

## 2024-03-28 DIAGNOSIS — E61.1 IRON DEFICIENCY: Primary | ICD-10-CM

## 2024-03-28 RX ORDER — FERROUS GLUCONATE 325 MG
TABLET ORAL
Qty: 84 TABLET | Refills: 3 | Status: SHIPPED | OUTPATIENT
Start: 2024-03-28

## 2024-04-04 ENCOUNTER — APPOINTMENT (OUTPATIENT)
Dept: GASTROENTEROLOGY | Facility: CLINIC | Age: 74
End: 2024-04-04
Payer: MEDICARE

## 2024-04-11 DIAGNOSIS — K21.9 GERD WITHOUT ESOPHAGITIS: Primary | ICD-10-CM

## 2024-04-15 RX ORDER — OMEPRAZOLE 20 MG/1
20 CAPSULE, DELAYED RELEASE ORAL DAILY
Qty: 100 CAPSULE | Refills: 2 | Status: SHIPPED | OUTPATIENT
Start: 2024-04-15

## 2024-04-16 ENCOUNTER — TELEPHONE (OUTPATIENT)
Dept: PRIMARY CARE | Facility: CLINIC | Age: 74
End: 2024-04-16

## 2024-04-16 NOTE — TELEPHONE ENCOUNTER
Pt states either Vit D or Ferrous Gluconate is causing sob and other breathing issues, requesting advice of what to do.

## 2024-05-03 ENCOUNTER — TELEPHONE (OUTPATIENT)
Dept: PRIMARY CARE | Facility: CLINIC | Age: 74
End: 2024-05-03

## 2024-05-03 DIAGNOSIS — J43.2 CENTRILOBULAR EMPHYSEMA (MULTI): ICD-10-CM

## 2024-05-03 DIAGNOSIS — Z98.61 CAD S/P PERCUTANEOUS CORONARY ANGIOPLASTY: Primary | ICD-10-CM

## 2024-05-03 DIAGNOSIS — I25.10 CAD S/P PERCUTANEOUS CORONARY ANGIOPLASTY: Primary | ICD-10-CM

## 2024-05-03 RX ORDER — FLUTICASONE FUROATE, UMECLIDINIUM BROMIDE AND VILANTEROL TRIFENATATE 100; 62.5; 25 UG/1; UG/1; UG/1
1 POWDER RESPIRATORY (INHALATION) DAILY
Qty: 60 EACH | Refills: 0 | Status: SHIPPED | OUTPATIENT
Start: 2024-05-03 | End: 2024-05-16

## 2024-05-03 NOTE — PROGRESS NOTES
Patient states she's using albuterol about twice per day 3 times per week and she feels like she's not getting enough air after she uses it. She feels better if she doesn't. Will rx Trelegy to take daily.

## 2024-05-13 NOTE — TELEPHONE ENCOUNTER
Pt called and stated the trelegy has too many side effects and that she is not going to take it. Pt wanted to see what else she can take?

## 2024-05-16 NOTE — PROGRESS NOTES
Patient states she doesn't want to take Trelegy because it has too many potential side effects. She prefers to try something else but doesn't want anything with a steroid. Offered Anoro but she would like to look it up before I send in a script.

## 2024-05-17 DIAGNOSIS — J43.2 CENTRILOBULAR EMPHYSEMA (MULTI): Primary | ICD-10-CM

## 2024-05-17 RX ORDER — UMECLIDINIUM BROMIDE AND VILANTEROL TRIFENATATE 62.5; 25 UG/1; UG/1
1 POWDER RESPIRATORY (INHALATION) DAILY
Qty: 60 EACH | Refills: 3 | Status: SHIPPED | OUTPATIENT
Start: 2024-05-17

## 2024-05-31 ENCOUNTER — TELEPHONE (OUTPATIENT)
Dept: HEMATOLOGY/ONCOLOGY | Facility: HOSPITAL | Age: 74
End: 2024-05-31

## 2024-06-03 DIAGNOSIS — D72.820 LYMPHOCYTOSIS: Primary | ICD-10-CM

## 2024-06-03 NOTE — PROGRESS NOTES
Patient scheduled to see hematology on 7/23/24 for lymphocytosis. Dr. Pandya reached out and asked me to order flow cytometry for patient to get done prior to her appt. Order placed.

## 2024-06-06 ENCOUNTER — LAB (OUTPATIENT)
Dept: LAB | Facility: LAB | Age: 74
End: 2024-06-06
Payer: MEDICARE

## 2024-06-06 DIAGNOSIS — D50.9 IRON DEFICIENCY ANEMIA, UNSPECIFIED IRON DEFICIENCY ANEMIA TYPE: ICD-10-CM

## 2024-06-06 DIAGNOSIS — D72.820 LYMPHOCYTOSIS: ICD-10-CM

## 2024-06-06 LAB
BASOPHILS # BLD AUTO: 0.07 X10*3/UL (ref 0–0.1)
BASOPHILS NFR BLD AUTO: 0.8 %
EOSINOPHIL # BLD AUTO: 0.24 X10*3/UL (ref 0–0.4)
EOSINOPHIL NFR BLD AUTO: 2.8 %
ERYTHROCYTE [DISTWIDTH] IN BLOOD BY AUTOMATED COUNT: 15 % (ref 11.5–14.5)
HCT VFR BLD AUTO: 35.6 % (ref 36–46)
HGB BLD-MCNC: 11.8 G/DL (ref 12–16)
IMM GRANULOCYTES # BLD AUTO: 0.02 X10*3/UL (ref 0–0.5)
IMM GRANULOCYTES NFR BLD AUTO: 0.2 % (ref 0–0.9)
IRON SATN MFR SERPL: 16 % (ref 25–45)
IRON SERPL-MCNC: 67 UG/DL (ref 35–150)
LYMPHOCYTES # BLD AUTO: 4.19 X10*3/UL (ref 0.8–3)
LYMPHOCYTES NFR BLD AUTO: 48.9 %
MCH RBC QN AUTO: 24.4 PG (ref 26–34)
MCHC RBC AUTO-ENTMCNC: 33.1 G/DL (ref 32–36)
MCV RBC AUTO: 74 FL (ref 80–100)
MONOCYTES # BLD AUTO: 0.54 X10*3/UL (ref 0.05–0.8)
MONOCYTES NFR BLD AUTO: 6.3 %
NEUTROPHILS # BLD AUTO: 3.5 X10*3/UL (ref 1.6–5.5)
NEUTROPHILS NFR BLD AUTO: 41 %
NRBC BLD-RTO: 0 /100 WBCS (ref 0–0)
PLATELET # BLD AUTO: 245 X10*3/UL (ref 150–450)
RBC # BLD AUTO: 4.84 X10*6/UL (ref 4–5.2)
TIBC SERPL-MCNC: 415 UG/DL (ref 240–445)
UIBC SERPL-MCNC: 348 UG/DL (ref 110–370)
WBC # BLD AUTO: 8.6 X10*3/UL (ref 4.4–11.3)

## 2024-06-06 PROCEDURE — 85025 COMPLETE CBC W/AUTO DIFF WBC: CPT

## 2024-06-06 PROCEDURE — 36415 COLL VENOUS BLD VENIPUNCTURE: CPT

## 2024-06-06 PROCEDURE — 83540 ASSAY OF IRON: CPT

## 2024-06-06 PROCEDURE — 83550 IRON BINDING TEST: CPT

## 2024-06-07 ENCOUNTER — TELEPHONE (OUTPATIENT)
Dept: PRIMARY CARE | Facility: CLINIC | Age: 74
End: 2024-06-07
Payer: MEDICARE

## 2024-06-07 LAB
CELL COUNT (BLOOD): 8.6 X10*3/UL
CELL POPULATIONS: NORMAL
DIAGNOSIS: NORMAL
FLOW DIFFERENTIAL: NORMAL
FLOW TEST ORDERED: NORMAL
LAB TEST METHOD: NORMAL
NUMBER OF CELLS COLLECTED: NORMAL PER TUBE
PATH REPORT.TOTAL CANCER: NORMAL
SIGNATURE COMMENT: NORMAL
SPECIMEN VIABILITY: NORMAL

## 2024-06-11 DIAGNOSIS — J43.2 CENTRILOBULAR EMPHYSEMA (MULTI): Primary | ICD-10-CM

## 2024-06-11 NOTE — PROGRESS NOTES
Patient states she stopped using Anoro because it was giving her a headache and made her dizzy. She has been using albuterol 1-2 times per day. Advised that this means that her COPD is not well controlled. Recommend that she sees pulmonology. Order placed.

## 2024-06-11 NOTE — TELEPHONE ENCOUNTER
Pt needs a call back from you about her lab results. PT also states I hung up on her but there was no response on the opposite end when I picked up, she's highly upset. Thank you

## 2024-06-20 ENCOUNTER — LAB (OUTPATIENT)
Dept: LAB | Facility: LAB | Age: 74
End: 2024-06-20
Payer: MEDICARE

## 2024-06-20 ENCOUNTER — OFFICE VISIT (OUTPATIENT)
Dept: GASTROENTEROLOGY | Facility: CLINIC | Age: 74
End: 2024-06-20
Payer: MEDICARE

## 2024-06-20 VITALS
BODY MASS INDEX: 29.1 KG/M2 | OXYGEN SATURATION: 99 % | SYSTOLIC BLOOD PRESSURE: 145 MMHG | HEART RATE: 89 BPM | WEIGHT: 154 LBS | TEMPERATURE: 95 F | DIASTOLIC BLOOD PRESSURE: 74 MMHG

## 2024-06-20 DIAGNOSIS — K80.20 CALCULUS OF GALLBLADDER WITHOUT CHOLECYSTITIS WITHOUT OBSTRUCTION: ICD-10-CM

## 2024-06-20 DIAGNOSIS — R10.11 RUQ ABDOMINAL PAIN: ICD-10-CM

## 2024-06-20 DIAGNOSIS — Z86.19 HEPATITIS C VIRUS INFECTION CURED AFTER ANTIVIRAL DRUG THERAPY: ICD-10-CM

## 2024-06-20 DIAGNOSIS — Z86.19 HEPATITIS C VIRUS INFECTION CURED AFTER ANTIVIRAL DRUG THERAPY: Primary | ICD-10-CM

## 2024-06-20 DIAGNOSIS — Z71.41 ALCOHOL ABUSE COUNSELING AND SURVEILLANCE: ICD-10-CM

## 2024-06-20 LAB
APPEARANCE UR: CLEAR
BILIRUB UR STRIP.AUTO-MCNC: NEGATIVE MG/DL
COLOR UR: ABNORMAL
GLUCOSE UR STRIP.AUTO-MCNC: NORMAL MG/DL
KETONES UR STRIP.AUTO-MCNC: NEGATIVE MG/DL
LEUKOCYTE ESTERASE UR QL STRIP.AUTO: ABNORMAL
NITRITE UR QL STRIP.AUTO: NEGATIVE
PH UR STRIP.AUTO: 5 [PH]
PROT UR STRIP.AUTO-MCNC: NEGATIVE MG/DL
RBC # UR STRIP.AUTO: ABNORMAL /UL
RBC #/AREA URNS AUTO: ABNORMAL /HPF
SP GR UR STRIP.AUTO: 1.02
SQUAMOUS #/AREA URNS AUTO: ABNORMAL /HPF
UROBILINOGEN UR STRIP.AUTO-MCNC: NORMAL MG/DL
WBC #/AREA URNS AUTO: ABNORMAL /HPF

## 2024-06-20 PROCEDURE — 99213 OFFICE O/P EST LOW 20 MIN: CPT | Performed by: INTERNAL MEDICINE

## 2024-06-20 PROCEDURE — 1125F AMNT PAIN NOTED PAIN PRSNT: CPT | Performed by: INTERNAL MEDICINE

## 2024-06-20 PROCEDURE — 3077F SYST BP >= 140 MM HG: CPT | Performed by: INTERNAL MEDICINE

## 2024-06-20 PROCEDURE — 81001 URINALYSIS AUTO W/SCOPE: CPT

## 2024-06-20 PROCEDURE — 3078F DIAST BP <80 MM HG: CPT | Performed by: INTERNAL MEDICINE

## 2024-06-20 PROCEDURE — 3060F POS MICROALBUMINURIA REV: CPT | Performed by: INTERNAL MEDICINE

## 2024-06-20 PROCEDURE — 1159F MED LIST DOCD IN RCRD: CPT | Performed by: INTERNAL MEDICINE

## 2024-06-20 ASSESSMENT — PAIN SCALES - GENERAL: PAINLEVEL: 3

## 2024-06-20 NOTE — LETTER
"June 25, 2024     MIGUEL ANGEL Rosales  [address] [place] [address] [address]  [address]  [facility] [address]    Patient: Taylor Richard   YOB: 1950   Date of Visit: 6/20/2024       Dear GISELA Dickson-CNP:    Thank you for referring Taylor Richard to me for evaluation. Below are my notes for this consultation.  If you have questions, please do not hesitate to call me. I look forward to following your patient along with you.       Sincerely,     Seth Yeh MD      CC: No Recipients  ______________________________________________________________________________________    Subjective     Hepatology clinic follow up appointment.     History of Present Illness:   Taylor Richard is a 73 y.o. female who presents to GI/Liver clinic for follow up of HCV infection (cured), GERD, dyspepsia.    Recently developed some right sided abdominal pain that radiates towards her flank and right mid back. It comes and goes. Began a few days ago. Intensity: high intensity that lasts for few seconds then subsides. She believes movement and torque exacerbates the pain. For example when turning around to look backwards, or bending over forward.    Alcohol use: white wine - 2 glasses per day. Drinks out of boredom.     Summary:     In 2009, a liver biopsy demonstrated stage 1-2 fibrosis. She was initially treated with peg IFN and RBV and was a non-responder. In 2014, she was retreated with Harvoni and had a sustained response. She has been cured from her HCV infection. Follow-up Fibroscan last year with low LSM c/w very mild liver disease.     There has been no h/o hepatic decompensation.     In the past she had reported some moderate alcohol consumption - I have advised her in the past to stop alcohol and minimize consumption. She continues to drink fairly regularly, \"out of boredom.\"     The past few visits she has had a number of upper GI symptoms including epigastric discomfort and an occasional RUQ pain. She did a RUQ and liver " ultrasound. She then had an upper endoscopy.   She has issues with bloating and distension.   H2 breath test for SIBO (9/2023): negative.     She has had severe heartburn - better on omeprazole, under control.    Does drink white wine - a pint every day or two.      Review of Systems  Review of Systems   All other systems reviewed and are negative.      Past Medical History   has a past medical history of Acute candidiasis of vulva and vagina (01/06/2017), Age-related nuclear cataract, left eye (05/21/2018), Age-related nuclear cataract, right eye (01/10/2018), Cellulitis of left lower limb (05/27/2021), Chronic viral hepatitis C (Multi) (08/14/2017), Combined forms of age-related cataract, bilateral (12/13/2017), Contusion of right eyelid and periocular area, initial encounter (01/10/2018), Cortical age-related cataract, left eye (05/21/2018), Cortical age-related cataract, right eye (01/10/2018), Edema of larynx (12/30/2016), Encounter for immunization (05/26/2021), Hypertensive retinopathy, bilateral (12/19/2022), Meibomian gland dysfunction of unspecified eye, unspecified eyelid (12/19/2022), Myopia, bilateral (12/19/2022), Ocular hypertension, unspecified eye (01/15/2016), Ocular pain, right eye (01/10/2018), Other conditions influencing health status (05/21/2018), Other pulmonary collapse (06/28/2016), Other specified noninflammatory disorders of vulva and perineum (12/15/2014), Pain in right shoulder (04/06/2016), Pain in unspecified ankle and joints of unspecified foot (10/27/2015), Personal history of nicotine dependence (01/04/2023), Personal history of other diseases of the digestive system (08/17/2017), Personal history of other diseases of the respiratory system (05/03/2018), Personal history of other infectious and parasitic diseases (09/15/2021), Personal history of other infectious and parasitic diseases, Personal history of other specified conditions (08/14/2017), Personal history of other specified  conditions (03/27/2018), Prediabetes (12/19/2022), Preglaucoma, unspecified, bilateral (12/19/2022), Presence of intraocular lens (02/15/2018), Presence of intraocular lens (12/19/2022), Right upper quadrant pain (10/14/2021), Right upper quadrant pain (10/14/2021), Strain of unspecified muscle, fascia and tendon at shoulder and upper arm level, right arm, initial encounter (04/06/2016), Systemic lupus erythematosus, unspecified (Multi) (12/19/2022), Systemic lupus erythematosus, unspecified (Multi) (11/29/2017), Type 2 diabetes mellitus without complications (Multi) (01/04/2023), Type 2 diabetes mellitus without complications (Multi) (12/13/2017), Type 2 diabetes mellitus without complications (Multi) (10/31/2017), Unspecified symptoms and signs involving the genitourinary system (09/15/2021), and Unspecified visual field defects (04/27/2016).     Social History   reports that she has never smoked. She has never used smokeless tobacco. She reports current alcohol use. She reports that she does not use drugs.     Family History  family history includes Breast cancer in her daughter; COPD in her mother; Colon cancer in her mother; Hodgkin's lymphoma in her brother; Multiple myeloma in her sister; Prostate cancer in her father.     Allergies  Allergies   Allergen Reactions   • Acetaminophen Other   • Isosorbide Unknown   • Other Unknown   • Pravastatin Unknown and Nausea Only   • Codeine Unknown and Rash   • Losartan Rash   • Penicillins Unknown and Rash       Medications  Current Outpatient Medications   Medication Instructions   • albuterol (Proventil HFA) 90 mcg/actuation inhaler 2 puffs, inhalation, Every 4 hours PRN   • aspirin 81 mg, oral, Daily   • ezetimibe (ZETIA) 10 mg, oral, Daily   • ferrous gluconate (Fergon) 324 (38 Fe) mg tablet Take 1 tablet by mouth with breakfast 3 times per week   • latanoprost (Xalatan) 0.005 % ophthalmic solution 1 drop, Both Eyes, Nightly   • lidocaine (Xylocaine) 5 % ointment 1  "Application, Topical, 3 times daily   • metFORMIN (Glucophage) 500 mg tablet Take 2 tablets in the morning and 1 tablet in the evening   • nitroglycerin (NITROSTAT) 0.4 mg, sublingual, Every 5 min PRN   • omeprazole (PRILOSEC) 20 mg, oral, Daily   • OneTouch Ultra Test strip USE TO CHECK BLOOD SUGAR 3 TIMES DAILY   • umeclidinium-vilanteroL (Anoro Ellipta) 62.5-25 mcg/actuation blister with device 1 puff, inhalation, Daily        Objective   Visit Vitals  /74   Pulse 89   Temp 35 °C (95 °F)          11/7/2023     8:59 AM 11/22/2023    10:42 AM 2/1/2024     9:02 AM 2/8/2024    10:36 AM 2/23/2024     9:17 AM 3/21/2024    10:53 AM 6/20/2024     2:04 PM   Vitals   Systolic 143 130 145  120 130 145   Diastolic 77 71 72  77 64 74   Heart Rate 72 74 65  66 80 89   Temp   36.1 °C (97 °F)    35 °C (95 °F)   Height (in) 1.549 m (5' 1\") 1.549 m (5' 1\") 1.549 m (5' 1\") 1.549 m (5' 1\") 1.549 m (5' 1\") 1.549 m (5' 1\")    Weight (lb) 151 151.4 151 149 148.38 150 154   BMI 28.53 kg/m2 28.61 kg/m2 28.53 kg/m2 28.15 kg/m2 28.04 kg/m2 28.34 kg/m2 29.1 kg/m2   BSA (m2) 1.72 m2 1.72 m2 1.72 m2 1.71 m2 1.7 m2 1.71 m2 1.73 m2   Visit Report Report Report Report Report Report Report Report     Physical Exam  Vitals reviewed.   Constitutional:       General: She is awake.      Appearance: Normal appearance.   HENT:      Head: Normocephalic and atraumatic.      Nose: Nose normal.      Mouth/Throat:      Mouth: Mucous membranes are moist.   Eyes:      Pupils: Pupils are equal, round, and reactive to light.   Cardiovascular:      Rate and Rhythm: Normal rate.   Pulmonary:      Effort: Pulmonary effort is normal.   Neurological:      Mental Status: She is alert and oriented to person, place, and time. Mental status is at baseline.   Psychiatric:         Attention and Perception: Attention and perception normal.         Mood and Affect: Mood normal.         Behavior: Behavior normal.       Labs    WBC   Date/Time Value Ref Range Status "   06/06/2024 08:42 AM 8.6 4.4 - 11.3 x10*3/uL Final     Hemoglobin   Date/Time Value Ref Range Status   06/06/2024 08:42 AM 11.8 (L) 12.0 - 16.0 g/dL Final     Hematocrit   Date/Time Value Ref Range Status   06/06/2024 08:42 AM 35.6 (L) 36.0 - 46.0 % Final     MCV   Date/Time Value Ref Range Status   06/06/2024 08:42 AM 74 (L) 80 - 100 fL Final     Platelets   Date/Time Value Ref Range Status   06/06/2024 08:42  150 - 450 x10*3/uL Final        Total Protein   Date/Time Value Ref Range Status   03/21/2024 11:20 AM 7.4 6.4 - 8.2 g/dL Final     Albumin   Date/Time Value Ref Range Status   03/21/2024 11:20 AM 4.3 3.4 - 5.0 g/dL Final     AST   Date/Time Value Ref Range Status   03/21/2024 11:20 AM 14 9 - 39 U/L Final     ALT   Date/Time Value Ref Range Status   03/21/2024 11:20 AM 7 7 - 45 U/L Final     Comment:     Patients treated with Sulfasalazine may generate falsely decreased results for ALT.     Alkaline Phosphatase   Date/Time Value Ref Range Status   03/21/2024 11:20 AM 84 33 - 136 U/L Final     Bilirubin, Total   Date/Time Value Ref Range Status   03/21/2024 11:20 AM 0.2 0.0 - 1.2 mg/dL Final     Bilirubin, Direct   Date/Time Value Ref Range Status   02/02/2024 08:50 AM 0.0 0.0 - 0.3 mg/dL Final        Vitamin D, 25-Hydroxy, Total   Date/Time Value Ref Range Status   03/21/2024 11:20 AM 11 (L) 30 - 100 ng/mL Final        AST   Date/Time Value Ref Range Status   03/21/2024 11:20 AM 14 9 - 39 U/L Final     ALT   Date/Time Value Ref Range Status   03/21/2024 11:20 AM 7 7 - 45 U/L Final     Comment:     Patients treated with Sulfasalazine may generate falsely decreased results for ALT.     Alkaline Phosphatase   Date/Time Value Ref Range Status   03/21/2024 11:20 AM 84 33 - 136 U/L Final     Bilirubin, Total   Date/Time Value Ref Range Status   03/21/2024 11:20 AM 0.2 0.0 - 1.2 mg/dL Final     Bilirubin, Direct   Date/Time Value Ref Range Status   02/02/2024 08:50 AM 0.0 0.0 - 0.3 mg/dL Final     Albumin    Date/Time Value Ref Range Status   03/21/2024 11:20 AM 4.3 3.4 - 5.0 g/dL Final     Total Protein   Date/Time Value Ref Range Status   03/21/2024 11:20 AM 7.4 6.4 - 8.2 g/dL Final        Protime   Date/Time Value Ref Range Status   01/26/2022 10:58 AM 10.7 9.8 - 13.4 sec Final     Comment:     Note new reference range as of 11/30/2021 at 10:00am.     INR   Date/Time Value Ref Range Status   01/26/2022 10:58 AM 0.9 0.9 - 1.1 Final     Imaging  10/2021  Ultrasound:  LIVER:  The liver measures 18.1 cm and is grossly unremarkable and free of  any focal lesions.     GALLBLADDER:  The gallbladder is nondistended, and demonstrates no evidence of  wall thickening or surrounding fluid. There is a shadowing echogenic  focus consistent with a gallstone measuring 2.5 cm x 1.9 cm x 1.0 cm.  Sonographic Barker's sign is negative.     BILIARY SYSTEM:  No evidence of intra or extrahepatic biliary dilatation is  identified; the common bile duct measures 4-5 mm.     PANCREAS:  The pancreas is poorly visualized due to overlying bowel gas.     RIGHT KIDNEY:  The right kidney measures 11.5 cm in length. No hydronephrosis or  renal calculi are seen.     PERITONEUM AND RETROPERITONEUM:  There is no free or loculated fluid seen in the abdomen.     IMPRESSION:  Cholelithiasis without sonographic evidence of acute cholecystitis.  No biliary dilatation identified.    Assessment/Plan   Taylor Richard is a 73 y.o. female who presents to GI/LIver clinic for GERD, and non ulcer dyspepsia. She has a history of HCV infection, treated and cured.    Impression:  Chronic HCV infection, treated with Harvoni with a sustained response. She has been cured from her HCV infection many years ago and has mild liver fibrosis.     She continue to have episodic and regular alcohol consumption. I counselled her regarding harms of alcohol abuse especially given her prior HCV infection.     She does have epigastric discmofort, heartburn, abdominal bloating and  distension.     Today we discussed her right upper quadrant and flank discomfort. I don think this is liver or gallstone related - will repeat imaging to exclude this possibility. Will check lipase to rule out pancreatitis.    Will also send U/A to evaluate for UTI, kidney stones as other conditions on the differential.     Plan:    Lab work up - lipase, liver enzymes.  Urinalysis with microscopic.  Liver US/RUQ US.     As before:  Zofran tablets for nausea: not taking and discontinued today.     omeprazole 20 mg daily - will continue, controlled.     We discussed avoiding eating in the late afternoon.     Hydrogen breath test for SIBO : neg (9/23)     Discussed alcohol consumption - provided counseling.    Instructions      Seth Yeh MD

## 2024-06-20 NOTE — PATIENT INSTRUCTIONS
Welcome to Dr. [facility].  Dr. Yeh sees patients at the following sites:  [place] [address] [facility] at [facility], [address] at [facility] at [facility], [facility] [address]    Dr. Yeh's hepatology care coordinator, Ama HUERTA, can be reached at [phone].  Dr. Yeh's , Toshia John, can be reached at [phone].    You are scheduled to see Flash Cao NP for your GI issues. Please keep this appointment.     Get blood work and urine studies today.    Get a Liver Ultrasound now.   Do not eat or drink anything 6 hours prior to your exam.  Call [phone] to schedule.    You will need to be seen again in 1 year.

## 2024-06-20 NOTE — PROGRESS NOTES
"Subjective     Hepatology clinic follow up appointment.     History of Present Illness:   Taylor Richard is a 73 y.o. female who presents to GI/Liver clinic for follow up of HCV infection (cured), GERD, dyspepsia.    Recently developed some right sided abdominal pain that radiates towards her flank and right mid back. It comes and goes. Began a few days ago. Intensity: high intensity that lasts for few seconds then subsides. She believes movement and torque exacerbates the pain. For example when turning around to look backwards, or bending over forward.    Alcohol use: white wine - 2 glasses per day. Drinks out of boredom.     Summary:     In 2009, a liver biopsy demonstrated stage 1-2 fibrosis. She was initially treated with peg IFN and RBV and was a non-responder. In 2014, she was retreated with Harvoni and had a sustained response. She has been cured from her HCV infection. Follow-up Fibroscan last year with low LSM c/w very mild liver disease.     There has been no h/o hepatic decompensation.     In the past she had reported some moderate alcohol consumption - I have advised her in the past to stop alcohol and minimize consumption. She continues to drink fairly regularly, \"out of boredom.\"     The past few visits she has had a number of upper GI symptoms including epigastric discomfort and an occasional RUQ pain. She did a RUQ and liver ultrasound. She then had an upper endoscopy.   She has issues with bloating and distension.   H2 breath test for SIBO (9/2023): negative.     She has had severe heartburn - better on omeprazole, under control.    Does drink white wine - a pint every day or two.      Review of Systems  Review of Systems   All other systems reviewed and are negative.      Past Medical History   has a past medical history of Acute candidiasis of vulva and vagina (01/06/2017), Age-related nuclear cataract, left eye (05/21/2018), Age-related nuclear cataract, right eye (01/10/2018), Cellulitis of " left lower limb (05/27/2021), Chronic viral hepatitis C (Multi) (08/14/2017), Combined forms of age-related cataract, bilateral (12/13/2017), Contusion of right eyelid and periocular area, initial encounter (01/10/2018), Cortical age-related cataract, left eye (05/21/2018), Cortical age-related cataract, right eye (01/10/2018), Edema of larynx (12/30/2016), Encounter for immunization (05/26/2021), Hypertensive retinopathy, bilateral (12/19/2022), Meibomian gland dysfunction of unspecified eye, unspecified eyelid (12/19/2022), Myopia, bilateral (12/19/2022), Ocular hypertension, unspecified eye (01/15/2016), Ocular pain, right eye (01/10/2018), Other conditions influencing health status (05/21/2018), Other pulmonary collapse (06/28/2016), Other specified noninflammatory disorders of vulva and perineum (12/15/2014), Pain in right shoulder (04/06/2016), Pain in unspecified ankle and joints of unspecified foot (10/27/2015), Personal history of nicotine dependence (01/04/2023), Personal history of other diseases of the digestive system (08/17/2017), Personal history of other diseases of the respiratory system (05/03/2018), Personal history of other infectious and parasitic diseases (09/15/2021), Personal history of other infectious and parasitic diseases, Personal history of other specified conditions (08/14/2017), Personal history of other specified conditions (03/27/2018), Prediabetes (12/19/2022), Preglaucoma, unspecified, bilateral (12/19/2022), Presence of intraocular lens (02/15/2018), Presence of intraocular lens (12/19/2022), Right upper quadrant pain (10/14/2021), Right upper quadrant pain (10/14/2021), Strain of unspecified muscle, fascia and tendon at shoulder and upper arm level, right arm, initial encounter (04/06/2016), Systemic lupus erythematosus, unspecified (Multi) (12/19/2022), Systemic lupus erythematosus, unspecified (Multi) (11/29/2017), Type 2 diabetes mellitus without complications (Multi)  (01/04/2023), Type 2 diabetes mellitus without complications (Multi) (12/13/2017), Type 2 diabetes mellitus without complications (Multi) (10/31/2017), Unspecified symptoms and signs involving the genitourinary system (09/15/2021), and Unspecified visual field defects (04/27/2016).     Social History   reports that she has never smoked. She has never used smokeless tobacco. She reports current alcohol use. She reports that she does not use drugs.     Family History  family history includes Breast cancer in her daughter; COPD in her mother; Colon cancer in her mother; Hodgkin's lymphoma in her brother; Multiple myeloma in her sister; Prostate cancer in her father.     Allergies  Allergies   Allergen Reactions    Acetaminophen Other    Isosorbide Unknown    Other Unknown    Pravastatin Unknown and Nausea Only    Codeine Unknown and Rash    Losartan Rash    Penicillins Unknown and Rash       Medications  Current Outpatient Medications   Medication Instructions    albuterol (Proventil HFA) 90 mcg/actuation inhaler 2 puffs, inhalation, Every 4 hours PRN    aspirin 81 mg, oral, Daily    ezetimibe (ZETIA) 10 mg, oral, Daily    ferrous gluconate (Fergon) 324 (38 Fe) mg tablet Take 1 tablet by mouth with breakfast 3 times per week    latanoprost (Xalatan) 0.005 % ophthalmic solution 1 drop, Both Eyes, Nightly    lidocaine (Xylocaine) 5 % ointment 1 Application, Topical, 3 times daily    metFORMIN (Glucophage) 500 mg tablet Take 2 tablets in the morning and 1 tablet in the evening    nitroglycerin (NITROSTAT) 0.4 mg, sublingual, Every 5 min PRN    omeprazole (PRILOSEC) 20 mg, oral, Daily    OneTouch Ultra Test strip USE TO CHECK BLOOD SUGAR 3 TIMES DAILY    umeclidinium-vilanteroL (Anoro Ellipta) 62.5-25 mcg/actuation blister with device 1 puff, inhalation, Daily        Objective   Visit Vitals  /74   Pulse 89   Temp 35 °C (95 °F)          11/7/2023     8:59 AM 11/22/2023    10:42 AM 2/1/2024     9:02 AM 2/8/2024     "10:36 AM 2/23/2024     9:17 AM 3/21/2024    10:53 AM 6/20/2024     2:04 PM   Vitals   Systolic 143 130 145  120 130 145   Diastolic 77 71 72  77 64 74   Heart Rate 72 74 65  66 80 89   Temp   36.1 °C (97 °F)    35 °C (95 °F)   Height (in) 1.549 m (5' 1\") 1.549 m (5' 1\") 1.549 m (5' 1\") 1.549 m (5' 1\") 1.549 m (5' 1\") 1.549 m (5' 1\")    Weight (lb) 151 151.4 151 149 148.38 150 154   BMI 28.53 kg/m2 28.61 kg/m2 28.53 kg/m2 28.15 kg/m2 28.04 kg/m2 28.34 kg/m2 29.1 kg/m2   BSA (m2) 1.72 m2 1.72 m2 1.72 m2 1.71 m2 1.7 m2 1.71 m2 1.73 m2   Visit Report Report Report Report Report Report Report Report     Physical Exam  Vitals reviewed.   Constitutional:       General: She is awake.      Appearance: Normal appearance.   HENT:      Head: Normocephalic and atraumatic.      Nose: Nose normal.      Mouth/Throat:      Mouth: Mucous membranes are moist.   Eyes:      Pupils: Pupils are equal, round, and reactive to light.   Cardiovascular:      Rate and Rhythm: Normal rate.   Pulmonary:      Effort: Pulmonary effort is normal.   Neurological:      Mental Status: She is alert and oriented to person, place, and time. Mental status is at baseline.   Psychiatric:         Attention and Perception: Attention and perception normal.         Mood and Affect: Mood normal.         Behavior: Behavior normal.       Labs    WBC   Date/Time Value Ref Range Status   06/06/2024 08:42 AM 8.6 4.4 - 11.3 x10*3/uL Final     Hemoglobin   Date/Time Value Ref Range Status   06/06/2024 08:42 AM 11.8 (L) 12.0 - 16.0 g/dL Final     Hematocrit   Date/Time Value Ref Range Status   06/06/2024 08:42 AM 35.6 (L) 36.0 - 46.0 % Final     MCV   Date/Time Value Ref Range Status   06/06/2024 08:42 AM 74 (L) 80 - 100 fL Final     Platelets   Date/Time Value Ref Range Status   06/06/2024 08:42  150 - 450 x10*3/uL Final        Total Protein   Date/Time Value Ref Range Status   03/21/2024 11:20 AM 7.4 6.4 - 8.2 g/dL Final     Albumin   Date/Time Value Ref Range " Status   03/21/2024 11:20 AM 4.3 3.4 - 5.0 g/dL Final     AST   Date/Time Value Ref Range Status   03/21/2024 11:20 AM 14 9 - 39 U/L Final     ALT   Date/Time Value Ref Range Status   03/21/2024 11:20 AM 7 7 - 45 U/L Final     Comment:     Patients treated with Sulfasalazine may generate falsely decreased results for ALT.     Alkaline Phosphatase   Date/Time Value Ref Range Status   03/21/2024 11:20 AM 84 33 - 136 U/L Final     Bilirubin, Total   Date/Time Value Ref Range Status   03/21/2024 11:20 AM 0.2 0.0 - 1.2 mg/dL Final     Bilirubin, Direct   Date/Time Value Ref Range Status   02/02/2024 08:50 AM 0.0 0.0 - 0.3 mg/dL Final        Vitamin D, 25-Hydroxy, Total   Date/Time Value Ref Range Status   03/21/2024 11:20 AM 11 (L) 30 - 100 ng/mL Final        AST   Date/Time Value Ref Range Status   03/21/2024 11:20 AM 14 9 - 39 U/L Final     ALT   Date/Time Value Ref Range Status   03/21/2024 11:20 AM 7 7 - 45 U/L Final     Comment:     Patients treated with Sulfasalazine may generate falsely decreased results for ALT.     Alkaline Phosphatase   Date/Time Value Ref Range Status   03/21/2024 11:20 AM 84 33 - 136 U/L Final     Bilirubin, Total   Date/Time Value Ref Range Status   03/21/2024 11:20 AM 0.2 0.0 - 1.2 mg/dL Final     Bilirubin, Direct   Date/Time Value Ref Range Status   02/02/2024 08:50 AM 0.0 0.0 - 0.3 mg/dL Final     Albumin   Date/Time Value Ref Range Status   03/21/2024 11:20 AM 4.3 3.4 - 5.0 g/dL Final     Total Protein   Date/Time Value Ref Range Status   03/21/2024 11:20 AM 7.4 6.4 - 8.2 g/dL Final        Protime   Date/Time Value Ref Range Status   01/26/2022 10:58 AM 10.7 9.8 - 13.4 sec Final     Comment:     Note new reference range as of 11/30/2021 at 10:00am.     INR   Date/Time Value Ref Range Status   01/26/2022 10:58 AM 0.9 0.9 - 1.1 Final     Imaging  10/2021  Ultrasound:  LIVER:  The liver measures 18.1 cm and is grossly unremarkable and free of  any focal lesions.     GALLBLADDER:  The  gallbladder is nondistended, and demonstrates no evidence of  wall thickening or surrounding fluid. There is a shadowing echogenic  focus consistent with a gallstone measuring 2.5 cm x 1.9 cm x 1.0 cm.  Sonographic Barker's sign is negative.     BILIARY SYSTEM:  No evidence of intra or extrahepatic biliary dilatation is  identified; the common bile duct measures 4-5 mm.     PANCREAS:  The pancreas is poorly visualized due to overlying bowel gas.     RIGHT KIDNEY:  The right kidney measures 11.5 cm in length. No hydronephrosis or  renal calculi are seen.     PERITONEUM AND RETROPERITONEUM:  There is no free or loculated fluid seen in the abdomen.     IMPRESSION:  Cholelithiasis without sonographic evidence of acute cholecystitis.  No biliary dilatation identified.    Assessment/Plan   Taylor Richard is a 73 y.o. female who presents to GI/LIver clinic for GERD, and non ulcer dyspepsia. She has a history of HCV infection, treated and cured.    Impression:  Chronic HCV infection, treated with Harvoni with a sustained response. She has been cured from her HCV infection many years ago and has mild liver fibrosis.     She continue to have episodic and regular alcohol consumption. I counselled her regarding harms of alcohol abuse especially given her prior HCV infection.     She does have epigastric discmofort, heartburn, abdominal bloating and distension.     Today we discussed her right upper quadrant and flank discomfort. I don think this is liver or gallstone related - will repeat imaging to exclude this possibility. Will check lipase to rule out pancreatitis.    Will also send U/A to evaluate for UTI, kidney stones as other conditions on the differential.     Plan:    Lab work up - lipase, liver enzymes.  Urinalysis with microscopic.  Liver US/RUQ US.     As before:  Zofran tablets for nausea: not taking and discontinued today.     omeprazole 20 mg daily - will continue, controlled.     We discussed avoiding eating in  the late afternoon.     Hydrogen breath test for SIBO : neg (9/23)     Discussed alcohol consumption - provided counseling.    Instructions      Seth Yeh MD

## 2024-06-21 ENCOUNTER — TELEPHONE (OUTPATIENT)
Dept: GASTROENTEROLOGY | Facility: HOSPITAL | Age: 74
End: 2024-06-21

## 2024-06-21 ENCOUNTER — LAB (OUTPATIENT)
Dept: LAB | Facility: LAB | Age: 74
End: 2024-06-21
Payer: MEDICARE

## 2024-06-21 DIAGNOSIS — R10.11 RUQ ABDOMINAL PAIN: ICD-10-CM

## 2024-06-21 DIAGNOSIS — Z71.41 ALCOHOL ABUSE COUNSELING AND SURVEILLANCE: ICD-10-CM

## 2024-06-21 DIAGNOSIS — K80.20 CALCULUS OF GALLBLADDER WITHOUT CHOLECYSTITIS WITHOUT OBSTRUCTION: ICD-10-CM

## 2024-06-21 DIAGNOSIS — Z86.19 HEPATITIS C VIRUS INFECTION CURED AFTER ANTIVIRAL DRUG THERAPY: ICD-10-CM

## 2024-06-21 LAB
ALBUMIN SERPL BCP-MCNC: 4.2 G/DL (ref 3.4–5)
ALP SERPL-CCNC: 70 U/L (ref 33–136)
ALT SERPL W P-5'-P-CCNC: 6 U/L (ref 7–45)
AST SERPL W P-5'-P-CCNC: 14 U/L (ref 9–39)
BILIRUB DIRECT SERPL-MCNC: 0.1 MG/DL (ref 0–0.3)
BILIRUB SERPL-MCNC: 0.4 MG/DL (ref 0–1.2)
LIPASE SERPL-CCNC: 62 U/L (ref 9–82)
PROT SERPL-MCNC: 7.4 G/DL (ref 6.4–8.2)

## 2024-06-21 PROCEDURE — 83690 ASSAY OF LIPASE: CPT

## 2024-06-21 PROCEDURE — 36415 COLL VENOUS BLD VENIPUNCTURE: CPT

## 2024-06-21 PROCEDURE — 80076 HEPATIC FUNCTION PANEL: CPT

## 2024-06-24 ENCOUNTER — HOSPITAL ENCOUNTER (OUTPATIENT)
Dept: RADIOLOGY | Facility: HOSPITAL | Age: 74
Discharge: HOME | End: 2024-06-24
Payer: MEDICARE

## 2024-06-24 DIAGNOSIS — R10.11 RUQ ABDOMINAL PAIN: Primary | ICD-10-CM

## 2024-06-24 DIAGNOSIS — Z86.19 HEPATITIS C VIRUS INFECTION CURED AFTER ANTIVIRAL DRUG THERAPY: ICD-10-CM

## 2024-06-24 DIAGNOSIS — Z71.41 ALCOHOL ABUSE COUNSELING AND SURVEILLANCE: ICD-10-CM

## 2024-06-24 DIAGNOSIS — R10.11 RUQ ABDOMINAL PAIN: ICD-10-CM

## 2024-06-24 DIAGNOSIS — K80.20 CALCULUS OF GALLBLADDER WITHOUT CHOLECYSTITIS WITHOUT OBSTRUCTION: ICD-10-CM

## 2024-06-24 PROCEDURE — 76705 ECHO EXAM OF ABDOMEN: CPT | Performed by: RADIOLOGY

## 2024-06-24 PROCEDURE — 76705 ECHO EXAM OF ABDOMEN: CPT

## 2024-06-24 NOTE — TELEPHONE ENCOUNTER
Hepatology Nurse Coordinator Note  Returned call to patient. She's aware I received her voicemails regarding her labs being completed and her wanting to know if she needs to be on an antibiotic for her urine tests. She's aware I did forward to Dr. Yeh and once I hear back, I'll reach back out. She verbalized understanding.    She states it Dr. Yeh prescribes her any medication, to send to [facility].

## 2024-06-25 NOTE — TELEPHONE ENCOUNTER
Hepatology Nurse Coordinator Note  Called patient to provide updated plan from Dr. Yeh regarding her urine results. She's aware Dr. Yeh would like her to repeat urine test with a clean catch in case of contamination. She's aware once completed, Dr. Yeh will provide updated recommendations. She verbalized understanding.     Patient also asked for liver ultrasound and lab results. She's aware her ultrasound results are still pending and Dr. Yeh will review altogether once back. Patient again verbalized understanding.

## 2024-06-26 ENCOUNTER — LAB (OUTPATIENT)
Dept: LAB | Facility: LAB | Age: 74
End: 2024-06-26
Payer: MEDICARE

## 2024-06-26 DIAGNOSIS — R10.11 RUQ ABDOMINAL PAIN: ICD-10-CM

## 2024-06-26 DIAGNOSIS — N39.0 UTI (URINARY TRACT INFECTION), UNCOMPLICATED: Primary | ICD-10-CM

## 2024-06-26 LAB
APPEARANCE UR: CLEAR
BACTERIA #/AREA URNS AUTO: ABNORMAL /HPF
BILIRUB UR STRIP.AUTO-MCNC: NEGATIVE MG/DL
COLOR UR: ABNORMAL
GLUCOSE UR STRIP.AUTO-MCNC: NORMAL MG/DL
HOLD SPECIMEN: NORMAL
KETONES UR STRIP.AUTO-MCNC: NEGATIVE MG/DL
LEUKOCYTE ESTERASE UR QL STRIP.AUTO: ABNORMAL
MUCOUS THREADS #/AREA URNS AUTO: ABNORMAL /LPF
NITRITE UR QL STRIP.AUTO: NEGATIVE
PH UR STRIP.AUTO: 5 [PH]
PROT UR STRIP.AUTO-MCNC: NEGATIVE MG/DL
RBC # UR STRIP.AUTO: NEGATIVE /UL
RBC #/AREA URNS AUTO: ABNORMAL /HPF
SP GR UR STRIP.AUTO: 1.02
SQUAMOUS #/AREA URNS AUTO: ABNORMAL /HPF
UROBILINOGEN UR STRIP.AUTO-MCNC: NORMAL MG/DL
WBC #/AREA URNS AUTO: ABNORMAL /HPF

## 2024-06-26 PROCEDURE — 81001 URINALYSIS AUTO W/SCOPE: CPT

## 2024-06-26 PROCEDURE — 87086 URINE CULTURE/COLONY COUNT: CPT

## 2024-06-26 RX ORDER — SULFAMETHOXAZOLE AND TRIMETHOPRIM 800; 160 MG/1; MG/1
1 TABLET ORAL 2 TIMES DAILY
Qty: 6 TABLET | Refills: 0 | Status: SHIPPED | OUTPATIENT
Start: 2024-06-26 | End: 2024-06-29

## 2024-06-26 NOTE — TELEPHONE ENCOUNTER
Hepatology Nurse Coordinator Note   Called patient to provide urine results from Dr. Yeh. Patient is aware, per Dr. Yeh, that she has a UTI. She's aware a prescription was sent to her pharmacy for treatment, Bactrim DS. She's aware that any further follow up related to UTI should be with her PCP, per Dr. Yeh. Patient asked for her liver ultrasound and lab results. She's aware I would review with Dr. Yeh. She verbalized understanding.       6/26/2024 3:40 PM ADDENDUM  Spoke with Dr. Yeh. He states he sent her lab and ultrasound results through [facility]. Called patient back. No answer. Left a voicemail message letting her know her results were in [facility] and to call if any questions.

## 2024-06-27 ENCOUNTER — TELEPHONE (OUTPATIENT)
Dept: GASTROENTEROLOGY | Facility: CLINIC | Age: 74
End: 2024-06-27
Payer: MEDICARE

## 2024-06-27 ENCOUNTER — TELEPHONE (OUTPATIENT)
Dept: PRIMARY CARE | Facility: CLINIC | Age: 74
End: 2024-06-27
Payer: MEDICARE

## 2024-06-27 LAB — BACTERIA UR CULT: NORMAL

## 2024-06-27 NOTE — TELEPHONE ENCOUNTER
"Hepatology Nurse Coordinator Note  Returned call to patient. Patient states she wanted to review results in more detail. She further stated she would like to know if an evaluation is going to be completed to further evaluate at what level of fibrosis she currently has.  She's aware I will review with Dr. Yeh to see if any recommendations.    She wanted to know more about her \"lymphocytes\" and then stated that her PCP told her she was \"referred to onc.\" She's aware she should follow up with her PCP and follow her recommendations regarding this. She verbalized understanding.   "

## 2024-07-02 NOTE — TELEPHONE ENCOUNTER
"Hepatology Nurse Coordinator Note   Returned call to patient with updated response from Dr. Yeh regarding fibrosis assessment. She is aware, per Dr. Yeh, that \"her liver has been assessed for fibrosis many times over the years. There is no need to reassess with Fibroscan at this time. Her last ultrasound shows a normal appearing liver.\" Communicated to patient who verbalized understanding.   "

## 2024-07-05 ENCOUNTER — OFFICE VISIT (OUTPATIENT)
Dept: GASTROENTEROLOGY | Facility: CLINIC | Age: 74
End: 2024-07-05
Payer: MEDICARE

## 2024-07-05 ENCOUNTER — APPOINTMENT (OUTPATIENT)
Dept: GASTROENTEROLOGY | Facility: CLINIC | Age: 74
End: 2024-07-05
Payer: MEDICARE

## 2024-07-05 VITALS
BODY MASS INDEX: 28.89 KG/M2 | SYSTOLIC BLOOD PRESSURE: 117 MMHG | DIASTOLIC BLOOD PRESSURE: 67 MMHG | HEIGHT: 61 IN | WEIGHT: 153 LBS | TEMPERATURE: 97.9 F | HEART RATE: 75 BPM

## 2024-07-05 DIAGNOSIS — R68.81 EARLY SATIETY: ICD-10-CM

## 2024-07-05 DIAGNOSIS — Z80.0 FAMILY HISTORY OF COLON CANCER IN MOTHER: ICD-10-CM

## 2024-07-05 DIAGNOSIS — G89.29 CHRONIC ABDOMINAL PAIN: Primary | ICD-10-CM

## 2024-07-05 DIAGNOSIS — R14.0 ABDOMINAL DISTENSION: ICD-10-CM

## 2024-07-05 DIAGNOSIS — R14.0 ABDOMINAL BLOATING: ICD-10-CM

## 2024-07-05 DIAGNOSIS — R14.3 EXCESSIVE FLATUS: ICD-10-CM

## 2024-07-05 DIAGNOSIS — R10.9 CHRONIC ABDOMINAL PAIN: Primary | ICD-10-CM

## 2024-07-05 PROCEDURE — 99213 OFFICE O/P EST LOW 20 MIN: CPT | Performed by: NURSE PRACTITIONER

## 2024-07-05 RX ORDER — OMEPRAZOLE 20 MG/1
20 CAPSULE, DELAYED RELEASE ORAL
COMMUNITY

## 2024-07-05 ASSESSMENT — ENCOUNTER SYMPTOMS
FATIGUE: 0
CHILLS: 0
AGITATION: 0
PALPITATIONS: 0
MYALGIAS: 0
ARTHRALGIAS: 0
PHOTOPHOBIA: 0
DIAPHORESIS: 0
BACK PAIN: 0
JOINT SWELLING: 0
HALLUCINATIONS: 0
EYE PAIN: 0
FEVER: 0
COUGH: 0
FREQUENCY: 0
FLANK PAIN: 0
NERVOUS/ANXIOUS: 0
HEMATURIA: 0
NUMBNESS: 0
SHORTNESS OF BREATH: 0
SORE THROAT: 0
LIGHT-HEADEDNESS: 0
WHEEZING: 0
HEADACHES: 0
DYSURIA: 0
DIZZINESS: 0
ADENOPATHY: 0
WEAKNESS: 0

## 2024-07-05 NOTE — PROGRESS NOTES
"Subjective   Patient ID: Taylor Richard is a 73 y.o. female who presents for GI check up.     This is a 73 year old AAF with history of CAD s/p NISHA to OM1 (2/2022 ), HTN, type II DM, GERD, hepatitis C s/p treatment (cured), and glaucoma who is presenting to the GI clinic for an initial visit.     History per pt and review of EMR     She is here today for a general GI check up. States \"I just want to make sure everything is okay\".     Reports for many years she's been having abdominal bloating and abdominal distension.     Omeprazole helps with abdominal \"fullness\".     Reports a lot of flatulence.     She has 3 formed stools per day.     ROS positive for many years of  sharp mid upper abdominal pain from the epigastrium to the umbilicus lasting a few minutes. Denies any change with defecation or passage of flatus. The pain is not related to food intake.     She also notes bilateral lower abdominal cramping at separate times (no change with defecation).     ROS positive for early satiety for many years (precedes her 2020 EGD).     Denies unintentional weight loss, heartburn, dysphagia, odynophagia, diarrhea, constipation, hematemesis, hematochezia, and melena.     She's never been on a gluten free diet.     Eats peppers, onions, and broccoli on rare occasion.     Dairy products do not cause her any GI distress.     EGD in 2006 through  noted H Pylori gastritis which was treated. Duodenal biopsies at that time were normal.     EGD in 2020 through  noted a 1 cm hiatal hernia and gastritis. Stomach biopsy was normal.     Hydrogen breath test negative for SIBO and methane (9/18/23 )     Colonoscopy 2017  for change in bowel habits: sigmoid colon diverticulosis     Abdominal US 6/24/24: multiple gallstones       Past medical history:   See above     Past surgical history:  Right eye cataract removal   Hysterectomy 20 years ago at [facility] for uterine fibroids per pt (reports ovaries and fallopian tubes " "removed as well)     Family history:  Mother- colon cancer   Brother-  of leukemia  Brother-  of non hodgkin's lymphoma   Sister- multiple myeloma     Social history:  Quit smoking cigarettes 4.5 years ago; smoked 1/2-1 PPD; started smoking as a teenager   Previously was drinking 1 pint of white wine daily, but occasionally would skip a week;  has not drank for a week as \"its killing me\"; admits to drinking because of boredom    Denies use of illicit drugs   Had one child, a daughter, who passed away             Review of Systems   Constitutional:  Negative for chills, diaphoresis, fatigue and fever.   HENT:  Negative for congestion, ear pain, hearing loss, sneezing and sore throat.    Eyes:  Negative for photophobia, pain and visual disturbance.   Respiratory:  Negative for cough, shortness of breath and wheezing.    Cardiovascular:  Negative for chest pain, palpitations and leg swelling.   Endocrine: Negative for cold intolerance and heat intolerance.   Genitourinary:  Negative for dysuria, flank pain, frequency and hematuria.   Musculoskeletal:  Negative for arthralgias, back pain, gait problem, joint swelling and myalgias.   Skin:  Negative for rash.   Neurological:  Negative for dizziness, syncope, weakness, light-headedness, numbness and headaches.   Hematological:  Negative for adenopathy.   Psychiatric/Behavioral:  Negative for agitation and hallucinations. The patient is not nervous/anxious.      Allergies   Allergen Reactions    Acetaminophen Other     \"Didn't sit right with me\"    Isosorbide Hives    Pravastatin Nausea Only and Unknown    Codeine Unknown and Rash    Losartan Rash    Penicillins Unknown and Rash      Current Outpatient Medications   Medication Sig Dispense Refill    albuterol (Proventil HFA) 90 mcg/actuation inhaler Inhale 2 puffs every 4 hours if needed for wheezing or shortness of breath. 6.7 g 5    aspirin 81 mg EC tablet Take 1 tablet (81 mg) by mouth once daily.      ezetimibe " "(Zetia) 10 mg tablet Take 1 tablet (10 mg) by mouth once daily. 90 tablet 3    ferrous gluconate (Fergon) 324 (38 Fe) mg tablet Take 1 tablet by mouth with breakfast 3 times per week 84 tablet 3    latanoprost (Xalatan) 0.005 % ophthalmic solution INSTILL 1 DROP INTO BOTH EYES AT BEDTIME 7.5 mL 3    metFORMIN (Glucophage) 500 mg tablet Take 2 tablets in the morning and 1 tablet in the evening 270 tablet 3    nitroglycerin (Nitrostat) 0.4 mg SL tablet Place 1 tablet (0.4 mg) under the tongue every 5 minutes if needed for chest pain.      omeprazole (PriLOSEC) 20 mg DR capsule Take 1 capsule (20 mg) by mouth once daily in the morning. Take before meals. Do not crush or chew.      OneTouch Ultra Test strip USE TO CHECK BLOOD SUGAR 3 TIMES DAILY 300 strip 2     No current facility-administered medications for this visit.      Objective     /67   Pulse 75   Temp 36.6 °C (97.9 °F)   Ht 1.549 m (5' 1\")   Wt 69.4 kg (153 lb)   BMI 28.91 kg/m²     Physical Exam  Constitutional:       General: She is not in acute distress.  HENT:      Head: Normocephalic and atraumatic.      Comments: Top teeth have been removed  Eyes:      Conjunctiva/sclera: Conjunctivae normal.   Cardiovascular:      Rate and Rhythm: Normal rate and regular rhythm.      Heart sounds: No murmur heard.     No gallop.   Pulmonary:      Effort: Pulmonary effort is normal.      Breath sounds: Normal breath sounds.   Abdominal:      General: Bowel sounds are normal. There is no distension.      Tenderness: There is no abdominal tenderness. There is no guarding.   Musculoskeletal:         General: No swelling or deformity. Normal range of motion.      Cervical back: Normal range of motion. No rigidity.   Skin:     General: Skin is warm and dry.      Coloration: Skin is not jaundiced.      Findings: No lesion or rash.   Neurological:      General: No focal deficit present.      Mental Status: She is alert and oriented to person, place, and time. "   Psychiatric:         Mood and Affect: Mood normal.         Assessment/Plan   Problem List Items Addressed This Visit    None  Visit Diagnoses       Chronic abdominal pain    -  Primary    Abdominal bloating        Abdominal distension        Excessive flatus        Early satiety        Family history of colon cancer in mother               Chronic diffuse migratory abdominal pain, abdominal bloating, abdominal distension, excessive flatulence, chronic early satiety: etiology not entirely clear, but I overall suspect these complaints are functional in nature   - advise trial of low FODMAP diet; reading materials provided   - advise trial of OTC IBgard    2. Family history of colon cancer in [place]:  - strongly recommend screening colonoscopy as she is overdue, but she declined at this time     3. Follow up:  - pt plans to follow up as needed

## 2024-07-05 NOTE — PATIENT INSTRUCTIONS
Thanks for coming to the GI clinic.     Try the low FODMAP diet.     Try OTC IBgard.     I recommend a colonoscopy given your family history, but I understand you are not interested in completing this now.     Follow up as needed.

## 2024-07-11 DIAGNOSIS — E11.9 TYPE 2 DIABETES MELLITUS WITHOUT COMPLICATION, WITHOUT LONG-TERM CURRENT USE OF INSULIN (MULTI): ICD-10-CM

## 2024-07-12 RX ORDER — METFORMIN HYDROCHLORIDE 500 MG/1
TABLET ORAL
Qty: 300 TABLET | Refills: 2 | Status: SHIPPED | OUTPATIENT
Start: 2024-07-12

## 2024-07-15 DIAGNOSIS — H40.003 GLAUCOMA SUSPECT OF BOTH EYES: ICD-10-CM

## 2024-07-15 RX ORDER — LATANOPROST 50 UG/ML
1 SOLUTION/ DROPS OPHTHALMIC NIGHTLY
Qty: 10 ML | Refills: 2 | Status: SHIPPED | OUTPATIENT
Start: 2024-07-15

## 2024-07-23 ENCOUNTER — OFFICE VISIT (OUTPATIENT)
Dept: HEMATOLOGY/ONCOLOGY | Facility: CLINIC | Age: 74
End: 2024-07-23
Payer: MEDICARE

## 2024-07-23 VITALS
OXYGEN SATURATION: 95 % | TEMPERATURE: 98.6 F | WEIGHT: 151.9 LBS | HEART RATE: 82 BPM | SYSTOLIC BLOOD PRESSURE: 135 MMHG | RESPIRATION RATE: 18 BRPM | BODY MASS INDEX: 28.68 KG/M2 | DIASTOLIC BLOOD PRESSURE: 75 MMHG | HEIGHT: 61 IN

## 2024-07-23 DIAGNOSIS — R79.89 ABNORMAL CBC: ICD-10-CM

## 2024-07-23 DIAGNOSIS — D68.61 ANTIPHOSPHOLIPID SYNDROME (MULTI): ICD-10-CM

## 2024-07-23 PROCEDURE — 1036F TOBACCO NON-USER: CPT | Performed by: INTERNAL MEDICINE

## 2024-07-23 PROCEDURE — 1159F MED LIST DOCD IN RCRD: CPT | Performed by: INTERNAL MEDICINE

## 2024-07-23 PROCEDURE — 3060F POS MICROALBUMINURIA REV: CPT | Performed by: INTERNAL MEDICINE

## 2024-07-23 PROCEDURE — 1160F RVW MEDS BY RX/DR IN RCRD: CPT | Performed by: INTERNAL MEDICINE

## 2024-07-23 PROCEDURE — 3075F SYST BP GE 130 - 139MM HG: CPT | Performed by: INTERNAL MEDICINE

## 2024-07-23 PROCEDURE — 3008F BODY MASS INDEX DOCD: CPT | Performed by: INTERNAL MEDICINE

## 2024-07-23 PROCEDURE — 99205 OFFICE O/P NEW HI 60 MIN: CPT | Performed by: INTERNAL MEDICINE

## 2024-07-23 PROCEDURE — 3078F DIAST BP <80 MM HG: CPT | Performed by: INTERNAL MEDICINE

## 2024-07-23 PROCEDURE — 1126F AMNT PAIN NOTED NONE PRSNT: CPT | Performed by: INTERNAL MEDICINE

## 2024-07-23 PROCEDURE — 99215 OFFICE O/P EST HI 40 MIN: CPT | Performed by: INTERNAL MEDICINE

## 2024-07-23 ASSESSMENT — PAIN SCALES - GENERAL: PAINLEVEL: 0-NO PAIN

## 2024-07-23 NOTE — PROGRESS NOTES
Patient ID: Taylor Richard is a 73 y.o. female.    Diagnosis:   Problem List Items Addressed This Visit       Antiphospholipid syndrome (Multi)     Other Visit Diagnoses       Abnormal CBC                 Treatment:   Oncology History    No history exists.   Lymphocytosis    Response:     Past Medical History:  AODM many years  Hepatitis C many years treated followed by Dr. Rao PALMER S/P single stent 2 years ago  Rheumatoid arthritis,in the past treated methotrexate, humira, currently not on any treatment    Past Medical History:   Diagnosis Date    Acute candidiasis of vulva and vagina 01/06/2017    Vaginal candida    Age-related nuclear cataract, left eye 05/21/2018    Age-related nuclear cataract, left    Age-related nuclear cataract, right eye 01/10/2018    Age-related nuclear cataract, right    Cellulitis of left lower limb 05/27/2021    Cellulitis of left lower extremity    Chronic viral hepatitis C (Multi) 08/14/2017    Chronic viral hepatitis C    Combined forms of age-related cataract, bilateral 12/13/2017    Combined form of age-related cataract, both eyes    Contusion of right eyelid and periocular area, initial encounter 01/10/2018    Periorbital ecchymosis of right eye    Cortical age-related cataract, left eye 05/21/2018    Cortical age-related cataract of left eye    Cortical age-related cataract, right eye 01/10/2018    Cortical age-related cataract of right eye    Edema of larynx 12/30/2016    Vocal cord edema    Encounter for immunization 05/26/2021    Encounter for immunization    Hypertensive retinopathy, bilateral 12/19/2022    Hypertensive retinopathy of both eyes    Meibomian gland dysfunction of unspecified eye, unspecified eyelid 12/19/2022    Meibomian gland dysfunction (MGD)    Myopia, bilateral 12/19/2022    Myopia of both eyes with astigmatism and presbyopia    Ocular hypertension, unspecified eye 01/15/2016    Elevated IOP    Ocular pain, right eye 01/10/2018    Pain of right orbit     Other conditions influencing health status 05/21/2018    History of cough    Other pulmonary collapse 06/28/2016    Collapsed lung    Other specified noninflammatory disorders of vulva and perineum 12/15/2014    Vulvar lesion    Pain in right shoulder 04/06/2016    Right shoulder pain    Pain in unspecified ankle and joints of unspecified foot 10/27/2015    Ankle joint pain    Personal history of nicotine dependence 01/04/2023    Former cigarette smoker    Personal history of other diseases of the digestive system 08/17/2017    History of cholelithiasis    Personal history of other diseases of the respiratory system 05/03/2018    History of acute bronchitis    Personal history of other infectious and parasitic diseases 09/15/2021    History of candidiasis of vagina    Personal history of other infectious and parasitic diseases     History of hepatitis    Personal history of other specified conditions 08/14/2017    History of nausea    Personal history of other specified conditions 03/27/2018    History of abdominal pain    Prediabetes 12/19/2022    Pre-diabetes    Preglaucoma, unspecified, bilateral 12/19/2022    Glaucoma suspect of both eyes    Presence of intraocular lens 02/15/2018    Bilateral pseudophakia    Presence of intraocular lens 12/19/2022    Pseudophakia of right eye    Right upper quadrant pain 10/14/2021    Right upper quadrant pain    Right upper quadrant pain 10/14/2021    RUQ pain    Strain of unspecified muscle, fascia and tendon at shoulder and upper arm level, right arm, initial encounter 04/06/2016    Right shoulder strain    Systemic lupus erythematosus, unspecified (Multi) 12/19/2022    Lupus (systemic lupus erythematosus)    Systemic lupus erythematosus, unspecified (Multi) 11/29/2017    History of systemic lupus erythematosus (SLE)    Type 2 diabetes mellitus without complications (Multi) 01/04/2023    Diabetes mellitus    Type 2 diabetes mellitus without complications (Multi)  12/13/2017    Controlled diabetes mellitus type II without complication    Type 2 diabetes mellitus without complications (Multi) 10/31/2017    Controlled diabetes mellitus type II without complication    Unspecified symptoms and signs involving the genitourinary system 09/15/2021    UTI symptoms    Unspecified visual field defects 04/27/2016    Visual field defect       Surgical History:   Total Hysterectomy for fibroids  Past Surgical History:   Procedure Laterality Date    CT ANGIO CORONARY ART WITH HEARTFLOW IF SCORE >30%  2/18/2019    CT HEART CORONARY ANGIOGRAM 2/18/2019 [facility]    OTHER SURGICAL HISTORY  10/14/2021    Complete colonoscopy    OTHER SURGICAL HISTORY  10/14/2021    Endoscopy    OTHER SURGICAL HISTORY  10/14/2021    Oral surgery    OTHER SURGICAL HISTORY  03/17/2022    Cardiac catheterization with stent placement    TOTAL ABDOMINAL HYSTERECTOMY W/ BILATERAL SALPINGOOPHORECTOMY  11/07/2014    Total Abdominal Hysterectomy With Removal Of Both Ovaries        Family History:   One brother who passed away from NHL in late 50.  Another brother had leukemia and succumbed to it late 70s.  Sister had multiple myeloma, daughter had breast cancer metastatic and passes away as well.   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 Graduated fromhigh school some college, no , drinks 1 pint of wine once a day. Former smoker 4.5 years formerly smoked 1.5 ppd. Remote history hx of IVDA, early 20s.  Has a hard time filling her days as her rent goes up with even a modest income job.  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  Patient referred by primary care after being noted to have moderate lymphocytosis with  recent CBC showing wbc of 8.6, hgb 11.8, platelets 245,000 and about 50% lymphocytes.  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 Recent ultrasound of his abdomen 6/2024 shows no adenopathy, nor did recent CT chest done for lung cancer screening.  She denies fevers sweats, has had bloating in abdomen and occasional belly pain, denies weight loss. She was born and [place].  She endorses history of RA has been in the past on methotrexate and plaquenil, she was last seen in 2018.  Currently not receiving therapy for RF. Has diffuse arthralgias in large joints. Denies, fevers, sweats, or weight loss.       Review of Systems   All other systems reviewed and are negative.     -------------------------------------------------------------------------------------------------------  Objective   BSA: 1.73 meters squared  /75   Pulse 82   Temp 37 °C (98.6 °F)   Resp 18   Ht (S) 1.555 m (5' 1.22\")   Wt 68.9 kg (151 lb 14.4 oz)   SpO2 95%   BMI 28.49 kg/m²     Physical Exam  Constitutional: "       Appearance: Normal appearance.   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 There is no evidence of lymphadenopathy on physical examination and other recent imaging so I do not think she needs to be panscanned to look for a lymphomatous process.  This T cell CUS can be associated with viral infections and immonodeficeincy disorders so will send hep B serologies, HIV and HTLV1 antibodies.  Seems that anemia is longstanding at least since 2018 and hgb electrophereisis shows hgb C trait so likely multifactorial and does not require additional workup.      RTC: 4-6 weeks to review lab evaluation as above, patient did not wish to have labs done today, will coordinae with next lab draw since she is a difficult stick          -------------------------------------------------------------------------------------------------------  Trinh Pandya MD

## 2024-07-24 ENCOUNTER — APPOINTMENT (OUTPATIENT)
Dept: PRIMARY CARE | Facility: CLINIC | Age: 74
End: 2024-07-24
Payer: MEDICARE

## 2024-07-24 ENCOUNTER — TELEPHONE (OUTPATIENT)
Dept: PRIMARY CARE | Facility: CLINIC | Age: 74
End: 2024-07-24

## 2024-07-24 ENCOUNTER — TELEPHONE (OUTPATIENT)
Dept: CARDIOLOGY | Facility: HOSPITAL | Age: 74
End: 2024-07-24

## 2024-07-24 VITALS
HEIGHT: 61 IN | DIASTOLIC BLOOD PRESSURE: 79 MMHG | SYSTOLIC BLOOD PRESSURE: 138 MMHG | BODY MASS INDEX: 28.32 KG/M2 | WEIGHT: 150 LBS | HEART RATE: 75 BPM

## 2024-07-24 DIAGNOSIS — R41.89 COGNITIVE IMPAIRMENT: ICD-10-CM

## 2024-07-24 DIAGNOSIS — I25.10 CAD S/P PERCUTANEOUS CORONARY ANGIOPLASTY: ICD-10-CM

## 2024-07-24 DIAGNOSIS — I10 BENIGN ESSENTIAL HYPERTENSION: ICD-10-CM

## 2024-07-24 DIAGNOSIS — E11.9 TYPE 2 DIABETES MELLITUS WITHOUT COMPLICATION, WITHOUT LONG-TERM CURRENT USE OF INSULIN (MULTI): ICD-10-CM

## 2024-07-24 DIAGNOSIS — Z98.61 CAD S/P PERCUTANEOUS CORONARY ANGIOPLASTY: ICD-10-CM

## 2024-07-24 DIAGNOSIS — J43.2 CENTRILOBULAR EMPHYSEMA (MULTI): ICD-10-CM

## 2024-07-24 DIAGNOSIS — R31.9 HEMATURIA, UNSPECIFIED TYPE: ICD-10-CM

## 2024-07-24 DIAGNOSIS — E55.9 VITAMIN D DEFICIENCY: ICD-10-CM

## 2024-07-24 DIAGNOSIS — Z00.00 MEDICARE ANNUAL WELLNESS VISIT, SUBSEQUENT: Primary | ICD-10-CM

## 2024-07-24 LAB
POC APPEARANCE, URINE: CLEAR
POC BILIRUBIN, URINE: NEGATIVE
POC BLOOD, URINE: NEGATIVE
POC COLOR, URINE: YELLOW
POC GLUCOSE, URINE: NEGATIVE MG/DL
POC HEMOGLOBIN A1C: 6.8 % (ref 4.2–6.5)
POC KETONES, URINE: NEGATIVE MG/DL
POC LEUKOCYTES, URINE: NEGATIVE
POC NITRITE,URINE: NEGATIVE
POC PH, URINE: 5.5 PH
POC PROTEIN, URINE: NEGATIVE MG/DL
POC SPECIFIC GRAVITY, URINE: >=1.03
POC UROBILINOGEN, URINE: 0.2 EU/DL

## 2024-07-24 PROCEDURE — 3078F DIAST BP <80 MM HG: CPT | Performed by: NURSE PRACTITIONER

## 2024-07-24 PROCEDURE — G0439 PPPS, SUBSEQ VISIT: HCPCS | Performed by: NURSE PRACTITIONER

## 2024-07-24 PROCEDURE — 99214 OFFICE O/P EST MOD 30 MIN: CPT | Performed by: NURSE PRACTITIONER

## 2024-07-24 PROCEDURE — 1124F ACP DISCUSS-NO DSCNMKR DOCD: CPT | Performed by: NURSE PRACTITIONER

## 2024-07-24 PROCEDURE — 1170F FXNL STATUS ASSESSED: CPT | Performed by: NURSE PRACTITIONER

## 2024-07-24 PROCEDURE — 3075F SYST BP GE 130 - 139MM HG: CPT | Performed by: NURSE PRACTITIONER

## 2024-07-24 PROCEDURE — 1160F RVW MEDS BY RX/DR IN RCRD: CPT | Performed by: NURSE PRACTITIONER

## 2024-07-24 PROCEDURE — 83036 HEMOGLOBIN GLYCOSYLATED A1C: CPT | Performed by: NURSE PRACTITIONER

## 2024-07-24 PROCEDURE — 3060F POS MICROALBUMINURIA REV: CPT | Performed by: NURSE PRACTITIONER

## 2024-07-24 PROCEDURE — 81003 URINALYSIS AUTO W/O SCOPE: CPT | Performed by: NURSE PRACTITIONER

## 2024-07-24 PROCEDURE — 1159F MED LIST DOCD IN RCRD: CPT | Performed by: NURSE PRACTITIONER

## 2024-07-24 PROCEDURE — 1036F TOBACCO NON-USER: CPT | Performed by: NURSE PRACTITIONER

## 2024-07-24 PROCEDURE — 3008F BODY MASS INDEX DOCD: CPT | Performed by: NURSE PRACTITIONER

## 2024-07-24 RX ORDER — ALBUTEROL SULFATE 90 UG/1
2 AEROSOL, METERED RESPIRATORY (INHALATION) EVERY 4 HOURS PRN
Qty: 6.7 G | Refills: 5 | Status: SHIPPED | OUTPATIENT
Start: 2024-07-24 | End: 2025-07-24

## 2024-07-24 RX ORDER — NITROGLYCERIN 0.4 MG/1
0.4 TABLET SUBLINGUAL EVERY 5 MIN PRN
Qty: 30 TABLET | Refills: 0 | Status: SHIPPED | OUTPATIENT
Start: 2024-07-24 | End: 2024-07-24 | Stop reason: SDUPTHER

## 2024-07-24 ASSESSMENT — ACTIVITIES OF DAILY LIVING (ADL)
MANAGING_FINANCES: INDEPENDENT
DOING_HOUSEWORK: INDEPENDENT
BATHING: INDEPENDENT
TAKING_MEDICATION: INDEPENDENT
DRESSING: INDEPENDENT
GROCERY_SHOPPING: INDEPENDENT

## 2024-07-24 ASSESSMENT — ENCOUNTER SYMPTOMS
OCCASIONAL FEELINGS OF UNSTEADINESS: 0
LOSS OF SENSATION IN FEET: 0
DEPRESSION: 0

## 2024-07-24 NOTE — PROGRESS NOTES
Subjective   Reason for Visit: Taylor Richard is an 73 y.o. female here for a Medicare Wellness visit.     Past Medical, Surgical, and Family History reviewed and updated in chart.Pt comes in for wellness exam. Pt wants to make sure she does not have a uti.     HPI      1. T2DM  Last HgA1c: 6.5% on 3/21/24  Current meds: metformin 1000 mg QAM and 500 mg QPM  Home glucose monitoring: every morning, running 130s  Diet: fair, has followed with nutritionist  Exercise: occasional walking  Statin: none, pravastatin caused chest pain - on Zetia  ACEI: none  ASA: yes, 81 mg QD  Last urine albumin: 1/4/23  Diabetic foot exam: 11/22/23  Vision exam: 2/12/24  Dental cleaning: Wears dentures   Pneumovax: 2020     Denies polyuria, polydipsia, vision changes, neuropathy, or hypoglycemic episodes. Does note some vaginal itching that started after she took Bactrim for a UTI a few weeks ago. Denies discharge. Has lotrisone at home which helped in the past but hasn't tried it      2. Hypertension  3. CAD  Current meds: Zetia 10 mg every day, nitroglycerin prn  Home blood pressure monitoring: every day, running 130s/70-80  Salt intake: limits   Caffeine intake: occasional tea  Diet: as noted above  Exercise: as noted above  Alcohol use: 1 pint of wine every other day  Tobacco use: none  Illicit drug use: none     Denies vision changes, headaches, dizziness, chest pain, palpitations, or edema     4. Emphysema  Using albuterol about 2-3 times per week when she feels she's not getting enough air       Patient Care Team:  MIGUEL ANGEL Rosales as PCP - General  MIGUEL ANGEL Rosales as PCP - [employer] PCP  Aung Blandon MD as Consulting Physician (Cardiology)  Deejay Terrell MD as Consulting Physician (Podiatry)  Zehra Goldstein MD as Surgeon (Ophthalmology)  Trinh Pandya MD as Consulting Physician (Hematology and Oncology)     Review of Systems  ROS negative except as noted above in HPI.     Objective  "  Vitals:  /79   Pulse 75   Ht 1.549 m (5' 1\")   Wt 68 kg (150 lb)   BMI 28.34 kg/m²       Physical Exam  General: Alert and oriented, in no acute distress. Appears stated age, well-nourished, and well hydrated  HEENT:  - Head: Normocephalic and atraumatic   - Eyes: EOMI, PERRLA  - ENT: Hearing grossly intact  Heart: RRR. No murmurs, clicks, or rubs  Lungs: Unlabored breathing. CTAB with no crackles, wheezes, or rhonchi  Abdomen: Normal BS in all 4 quadrants. Soft, non-tender, non-distended, with no masses  Extremities: Warm and well perfused. No edema. Normal peripheral pulses  Musculoskeletal: Normal gait and station  Neurological: Alert and oriented. No gross neurological deficits  Psychological: Appropriate mood and affect  Skin: No rash, abnormal lesions, cyanosis, or erythema    Assessment/Plan   1. T2DM  - IO HgA1c: 6.8%  - Continue metformin 1000 mg QAM and 500 mg QPM  - Counseled on diet, follow up with dietician  - Continue to check glucose at home every day  - Urine albumin up to date  - PPSV23 up to date  - Monofilament exam up to date  - Encouraged yearly retinal eye exams and twice yearly dental cleanings  - Counseled on lifestyle management     2. Hypertension  3. CAD  - BP controlled  - Continue ASA 81 mg every day, Zetia 10 mg every day, and nitroglycerin prn  - Continue to check BP at home every day  - Counseled on lifestyle management  - Check lipid panel     4. Emphysema  - Well controlled  - Continue albuterol prn, using 2-3 times per week    5. Vaginal itching  - In setting of recent ATB use  - Advised to take lotrisone cream (has at home)    6. Vitamin D deficiency  - Check vit D    7. Medicare Wellness Exam  - Vaccines due: RSV, COVID, shingrix --> directed to pharmacy for these  - Declined DEXA scan  - Declined mammogram  - Colonoscopy due 2027  - CT lung CA screening up to date; due Jan 2025  - MoCA score: 15/30  -- Referred to neurology for cognitive testing  - Maintain healthy " lifestyle    FU in 3-4 months for chronic care    Jocelin Hernandez, GISELA-CNP  [facility]

## 2024-07-25 ENCOUNTER — LAB (OUTPATIENT)
Dept: LAB | Facility: LAB | Age: 74
End: 2024-07-25
Payer: MEDICARE

## 2024-07-25 DIAGNOSIS — R79.89 ABNORMAL CBC: ICD-10-CM

## 2024-07-25 DIAGNOSIS — E55.9 VITAMIN D DEFICIENCY: ICD-10-CM

## 2024-07-25 DIAGNOSIS — B18.1 HEPATITIS B CARRIER (MULTI): Primary | ICD-10-CM

## 2024-07-25 DIAGNOSIS — Z98.61 CAD S/P PERCUTANEOUS CORONARY ANGIOPLASTY: ICD-10-CM

## 2024-07-25 DIAGNOSIS — B18.1 HEPATITIS B CARRIER (MULTI): ICD-10-CM

## 2024-07-25 DIAGNOSIS — I25.10 CAD S/P PERCUTANEOUS CORONARY ANGIOPLASTY: ICD-10-CM

## 2024-07-25 LAB
25(OH)D3 SERPL-MCNC: 77 NG/ML (ref 30–100)
BASOPHILS # BLD AUTO: 0.07 X10*3/UL (ref 0–0.1)
BASOPHILS NFR BLD AUTO: 0.8 %
CHOLEST SERPL-MCNC: 155 MG/DL (ref 0–199)
CHOLESTEROL/HDL RATIO: 3.7
EOSINOPHIL # BLD AUTO: 0.33 X10*3/UL (ref 0–0.4)
EOSINOPHIL NFR BLD AUTO: 3.8 %
ERYTHROCYTE [DISTWIDTH] IN BLOOD BY AUTOMATED COUNT: 15.1 % (ref 11.5–14.5)
FERRITIN SERPL-MCNC: 50 NG/ML (ref 8–150)
HBV CORE AB SER QL: REACTIVE
HBV SURFACE AG SERPL QL IA: NONREACTIVE
HCT VFR BLD AUTO: 34.1 % (ref 36–46)
HDLC SERPL-MCNC: 42 MG/DL
HGB BLD-MCNC: 11.4 G/DL (ref 12–16)
HIV 1+2 AB+HIV1 P24 AG SERPL QL IA: NONREACTIVE
IMM GRANULOCYTES # BLD AUTO: 0.01 X10*3/UL (ref 0–0.5)
IMM GRANULOCYTES NFR BLD AUTO: 0.1 % (ref 0–0.9)
IRON SATN MFR SERPL: 19 % (ref 25–45)
IRON SERPL-MCNC: 69 UG/DL (ref 35–150)
LDH SERPL L TO P-CCNC: 136 U/L (ref 84–246)
LDLC SERPL CALC-MCNC: 89 MG/DL
LYMPHOCYTES # BLD AUTO: 4.67 X10*3/UL (ref 0.8–3)
LYMPHOCYTES NFR BLD AUTO: 54.4 %
MCH RBC QN AUTO: 25.6 PG (ref 26–34)
MCHC RBC AUTO-ENTMCNC: 33.4 G/DL (ref 32–36)
MCV RBC AUTO: 77 FL (ref 80–100)
MONOCYTES # BLD AUTO: 0.45 X10*3/UL (ref 0.05–0.8)
MONOCYTES NFR BLD AUTO: 5.2 %
NEUTROPHILS # BLD AUTO: 3.06 X10*3/UL (ref 1.6–5.5)
NEUTROPHILS NFR BLD AUTO: 35.7 %
NON HDL CHOLESTEROL: 113 MG/DL (ref 0–149)
NRBC BLD-RTO: 0 /100 WBCS (ref 0–0)
PLATELET # BLD AUTO: 234 X10*3/UL (ref 150–450)
PROT SERPL-MCNC: 7.8 G/DL (ref 6.4–8.2)
RBC # BLD AUTO: 4.46 X10*6/UL (ref 4–5.2)
RHEUMATOID FACT SER NEPH-ACNC: 14 IU/ML (ref 0–15)
TIBC SERPL-MCNC: 365 UG/DL (ref 240–445)
TRIGL SERPL-MCNC: 120 MG/DL (ref 0–149)
UIBC SERPL-MCNC: 296 UG/DL (ref 110–370)
VLDL: 24 MG/DL (ref 0–40)
WBC # BLD AUTO: 8.6 X10*3/UL (ref 4.4–11.3)

## 2024-07-25 PROCEDURE — 87389 HIV-1 AG W/HIV-1&-2 AB AG IA: CPT

## 2024-07-25 PROCEDURE — 83615 LACTATE (LD) (LDH) ENZYME: CPT

## 2024-07-25 PROCEDURE — 85025 COMPLETE CBC W/AUTO DIFF WBC: CPT

## 2024-07-25 PROCEDURE — 86431 RHEUMATOID FACTOR QUANT: CPT

## 2024-07-25 PROCEDURE — 83550 IRON BINDING TEST: CPT

## 2024-07-25 PROCEDURE — 80061 LIPID PANEL: CPT

## 2024-07-25 PROCEDURE — 82306 VITAMIN D 25 HYDROXY: CPT

## 2024-07-25 PROCEDURE — 84155 ASSAY OF PROTEIN SERUM: CPT

## 2024-07-25 PROCEDURE — 87340 HEPATITIS B SURFACE AG IA: CPT

## 2024-07-25 PROCEDURE — 84165 PROTEIN E-PHORESIS SERUM: CPT

## 2024-07-25 PROCEDURE — 87517 HEPATITIS B DNA QUANT: CPT

## 2024-07-25 PROCEDURE — 36415 COLL VENOUS BLD VENIPUNCTURE: CPT

## 2024-07-25 PROCEDURE — 86704 HEP B CORE ANTIBODY TOTAL: CPT

## 2024-07-25 PROCEDURE — 83540 ASSAY OF IRON: CPT

## 2024-07-25 PROCEDURE — 83521 IG LIGHT CHAINS FREE EACH: CPT

## 2024-07-25 PROCEDURE — 82728 ASSAY OF FERRITIN: CPT

## 2024-07-25 PROCEDURE — 86790 VIRUS ANTIBODY NOS: CPT

## 2024-07-25 RX ORDER — NITROGLYCERIN 0.4 MG/1
0.4 TABLET SUBLINGUAL EVERY 5 MIN PRN
Qty: 25 TABLET | Refills: 3 | Status: SHIPPED | OUTPATIENT
Start: 2024-07-25 | End: 2024-08-05

## 2024-07-26 ENCOUNTER — TELEPHONE (OUTPATIENT)
Dept: PRIMARY CARE | Facility: CLINIC | Age: 74
End: 2024-07-26
Payer: MEDICARE

## 2024-07-26 DIAGNOSIS — E11.9 TYPE 2 DIABETES MELLITUS WITHOUT COMPLICATION, WITHOUT LONG-TERM CURRENT USE OF INSULIN (MULTI): ICD-10-CM

## 2024-07-26 LAB
KAPPA LC SERPL-MCNC: 4.19 MG/DL (ref 0.33–1.94)
KAPPA LC/LAMBDA SER: 1.35 {RATIO} (ref 0.26–1.65)
LAMBDA LC SERPL-MCNC: 3.1 MG/DL (ref 0.57–2.63)

## 2024-07-26 RX ORDER — BLOOD SUGAR DIAGNOSTIC
STRIP MISCELLANEOUS
Qty: 300 STRIP | Refills: 2 | Status: SHIPPED | OUTPATIENT
Start: 2024-07-26

## 2024-07-27 LAB
ALBUMIN: 4.2 G/DL (ref 3.4–5)
ALPHA 1 GLOBULIN: 0.3 G/DL (ref 0.2–0.6)
ALPHA 2 GLOBULIN: 1 G/DL (ref 0.4–1.1)
BETA GLOBULIN: 1 G/DL (ref 0.5–1.2)
GAMMA GLOBULIN: 1.3 G/DL (ref 0.5–1.4)
HTLV I+II AB SER QL IA: NEGATIVE
PATH REVIEW-SERUM PROTEIN ELECTROPHORESIS: NORMAL
PROTEIN ELECTROPHORESIS COMMENT: NORMAL

## 2024-07-31 ENCOUNTER — APPOINTMENT (OUTPATIENT)
Dept: HEMATOLOGY/ONCOLOGY | Facility: CLINIC | Age: 74
End: 2024-07-31
Payer: MEDICARE

## 2024-08-01 LAB
HBV DNA SERPL NAA+PROBE-ACNC: NOT DETECTED [IU]/ML
HBV DNA SERPL NAA+PROBE-LOG IU: NORMAL {LOG_IU}/ML

## 2024-08-05 DIAGNOSIS — J43.2 CENTRILOBULAR EMPHYSEMA (MULTI): ICD-10-CM

## 2024-08-05 RX ORDER — ALBUTEROL SULFATE 90 UG/1
2 INHALANT RESPIRATORY (INHALATION) EVERY 4 HOURS PRN
Qty: 40.2 G | Refills: 2 | Status: SHIPPED | OUTPATIENT
Start: 2024-08-05

## 2024-08-09 DIAGNOSIS — K21.9 GERD WITHOUT ESOPHAGITIS: Primary | ICD-10-CM

## 2024-08-12 ENCOUNTER — APPOINTMENT (OUTPATIENT)
Dept: OPHTHALMOLOGY | Facility: CLINIC | Age: 74
End: 2024-08-12
Payer: MEDICARE

## 2024-08-13 ENCOUNTER — DOCUMENTATION (OUTPATIENT)
Dept: HEMATOLOGY/ONCOLOGY | Facility: CLINIC | Age: 74
End: 2024-08-13
Payer: MEDICARE

## 2024-08-13 ENCOUNTER — TELEPHONE (OUTPATIENT)
Dept: ADMISSION | Facility: HOSPITAL | Age: 74
End: 2024-08-13
Payer: MEDICARE

## 2024-08-13 NOTE — TELEPHONE ENCOUNTER
Taylor called the office to discuss her lab results from 7/25. She is specifically concerned about her absolute lymphocytes.   She has a FUV at the end of the month, however, is requesting a phone call to discuss.  Team- let me know if you'd like me to set her up for a phone visit this week to review.

## 2024-08-13 NOTE — PROGRESS NOTES
Phone encounter    Spoke to patient about her her lab results.  She was concerned about her lymphocytosis which was her reason for her last visit.   She also had questions about all her other labs.  I explained that the flow study on her labs a minute clonally expanded T cell population of uncertain significance which could be from a number of conditions like autoimmune disorders, viral infections. 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 I explained that this is a variant of hemoglobin in certain populations and that there is no needed treatment.  Also told patient that she has been exposed to hepatitis B in the past and that she does not currently have an active infection.  She asked about liver damage and I told her that her most recent liver ultrasound shows no evidence of cirrhosis and she should discuss this further with Dr. Yeh. After providing all this information patient stated she wanted to cancer her followup with me on August 27th.  She decline to allow me to bill this extensive call as an phone visit.

## 2024-08-14 ENCOUNTER — TELEPHONE (OUTPATIENT)
Dept: HEMATOLOGY/ONCOLOGY | Facility: HOSPITAL | Age: 74
End: 2024-08-14

## 2024-08-14 RX ORDER — OMEPRAZOLE 20 MG/1
20 CAPSULE, DELAYED RELEASE ORAL
Qty: 30 CAPSULE | Refills: 3 | Status: SHIPPED | OUTPATIENT
Start: 2024-08-14

## 2024-08-15 ENCOUNTER — APPOINTMENT (OUTPATIENT)
Dept: PULMONOLOGY | Facility: CLINIC | Age: 74
End: 2024-08-15
Payer: MEDICARE

## 2024-08-19 ENCOUNTER — TELEPHONE (OUTPATIENT)
Dept: GASTROENTEROLOGY | Facility: HOSPITAL | Age: 74
End: 2024-08-19
Payer: MEDICARE

## 2024-08-19 NOTE — TELEPHONE ENCOUNTER
At her appointment with you, you had suggested a colonoscopy  Would she be able to do the Cologuard test?  [phone]

## 2024-08-27 ENCOUNTER — APPOINTMENT (OUTPATIENT)
Dept: HEMATOLOGY/ONCOLOGY | Facility: CLINIC | Age: 74
End: 2024-08-27
Payer: MEDICARE

## 2024-08-30 ASSESSMENT — CUP TO DISC RATIO
OD_RATIO: 0.25
OS_RATIO: 0.25

## 2024-08-30 NOTE — PROGRESS NOTES
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 OD: Thin S/I, bord T. OS: Thin S/T. Bord I. 61 OU. Stable from 8/14/23.  -HVF 24-2 (2/12/24) - OD: 13/14FL 3%FP. Scattered nasal, but WNL. OS: 5/16FL 1%FN. Inferonasal depression. Similar to 5/21/18.   -Patient had stopped latanoprost 9/2022 due to foreign body sensation, burning, irritation. Had been taking latanoprost OU since 2016 and not experienced symptoms before. Recommended changing to timolol 0.5% OU qAM, but patient declined to take due to potential side effects (cardiac). Restarted latanoprost OU QHS 12/2022.  -Plan: Discussed importance of compliance with drops and office visits due to risk of permanent vision loss with glaucoma.  Patient using latanoprost OU nightly without issue - reports good compliance.  F/u 6 months - refraction, glare/BAT OS, dilation OU, OCT RNFL (post dilation).    Combined form of age-related cataract, left eyeH25.812  -Visually significant cataract OS. Symptoms: Gradual decrease in vision x several months. Harder to read small print. More difficulty seeing small words/numbers on TV. Having more trouble seeing road signs when driving. Glare from headlights when driving at night.   -Lenstar (9/23/24)  -Pentacam (9/23/24) - OD: Irregular. 43.5/43.8 @ 169.1. OS: Irregular. 43.9/44.5 @ 163.3.   -D/w patient option of cataract surgery patient wishes to defer until Spring 2025. F/u 6 months - refraction, glare/BAT OS, dilation OU, OCT RNFL (post dilation).    Meibomian gland dysfunction (MGD)H02.89  Blepharitis of both eyesH01.003  -FBS, irritation, redness, itching OS>OD  -History of eyelids and eyebrows itchy like there are mites in them - likely some dryness and blepharitis -use erythromycin ophthalmic ointment.  -Continue warm compresses 1-2x/day  -Baby shampoo lid wash daily or iVizia wipes (samples given) - feels that these  don't help  -Using artificial tears once a day, advised to increase to 3-4x/day  -Will Rx: Azelastine OU BID prn itching/allergies. Eyes are itchy mainly in the fall - possible allergy component.   -Can consider Xdemvy as needed, or Restasis, Cequa, Vevye, Xiidra, Miebo, Tyrvaya for keratoconjunctivitis sicca. Verkazia for vernal conjunctivitis. Punctal plugs.     PVD (posterior vitreous detachment), right eyeH43.811  -Stable, continue to monitor.     Pseudophakia of right eyeZ96.1 - 12/7/17  -Doing well. Good vision. IOP good.     Pre-tzdyhnpwT00.03  Hypertensive retinopathy of both eyesH35.033  -OCT macula (9/23/24) - SS: 4/10 OD and 4/10 OS. Normal thickness and contour OU. Intact IS-OS OU. No edema OU. 255/247. Stable from 12/19/22.   -HbA1c= 6.8 (7/24/24 POCT). On Metformin. No diabetic retinopathy seen. Continue close monitoring of blood glucose, blood pressure, and cholesterol. Plan for annual dilated eye exam. Advised smoking cessation.     Myopia of both eyes with astigmatism and enhdwtkvfbT60.13  -Defer new Rx. No significant improvement in vision with refraction at this time.

## 2024-09-06 ENCOUNTER — TELEPHONE (OUTPATIENT)
Dept: GASTROENTEROLOGY | Facility: HOSPITAL | Age: 74
End: 2024-09-06
Payer: MEDICARE

## 2024-09-10 ENCOUNTER — DOCUMENTATION (OUTPATIENT)
Dept: GASTROENTEROLOGY | Facility: CLINIC | Age: 74
End: 2024-09-10
Payer: MEDICARE

## 2024-09-10 DIAGNOSIS — Z80.0 FAMILY HISTORY OF COLON CANCER IN MOTHER: ICD-10-CM

## 2024-09-10 DIAGNOSIS — Z80.0 FAMILY HX OF COLON CANCER REQUIRING SCREENING COLONOSCOPY: Primary | ICD-10-CM

## 2024-09-10 NOTE — PROGRESS NOTES
Pt called in requesting a screening colonoscopy. Her mother had colon cancer.  She is asking for strong sedation as she reports waking up during last colonoscopy.     PLAN:  - will proceed with screening colonoscopy with MAC  - OTC Miralax Gatorade split bowel prep

## 2024-09-11 ENCOUNTER — APPOINTMENT (OUTPATIENT)
Dept: HEMATOLOGY/ONCOLOGY | Facility: CLINIC | Age: 74
End: 2024-09-11
Payer: MEDICARE

## 2024-09-12 ENCOUNTER — DOCUMENTATION (OUTPATIENT)
Dept: GASTROENTEROLOGY | Facility: CLINIC | Age: 74
End: 2024-09-12
Payer: MEDICARE

## 2024-09-12 DIAGNOSIS — Z80.0 FAMILY HISTORY OF COLON CANCER IN MOTHER: ICD-10-CM

## 2024-09-12 DIAGNOSIS — Z80.0 FAMILY HX OF COLON CANCER REQUIRING SCREENING COLONOSCOPY: Primary | ICD-10-CM

## 2024-09-13 NOTE — PROGRESS NOTES
"I was finally able to speak with pt after she left me multiple voice mails.  She stated multiple times \"I am a heart patient, I cannot have propofol\". I previously  ordered her screening colonoscopy with MAC as she reports waking during her last colonoscopy. She is scheduled for colonoscopy on 2/26/25 with Dr. Marcus.     I am reordering her screening colonoscopy to be done with conscious sedation.       "

## 2024-09-19 ENCOUNTER — APPOINTMENT (OUTPATIENT)
Dept: HEMATOLOGY/ONCOLOGY | Facility: CLINIC | Age: 74
End: 2024-09-19
Payer: MEDICARE

## 2024-09-23 ENCOUNTER — APPOINTMENT (OUTPATIENT)
Dept: OPHTHALMOLOGY | Facility: CLINIC | Age: 74
End: 2024-09-23
Payer: MEDICARE

## 2024-09-23 DIAGNOSIS — H01.004 BLEPHARITIS OF UPPER EYELIDS OF BOTH EYES, UNSPECIFIED TYPE: ICD-10-CM

## 2024-09-23 DIAGNOSIS — H40.003 GLAUCOMA SUSPECT OF BOTH EYES: Primary | ICD-10-CM

## 2024-09-23 DIAGNOSIS — H02.889 MEIBOMIAN GLAND DYSFUNCTION: ICD-10-CM

## 2024-09-23 DIAGNOSIS — Z96.1 PSEUDOPHAKIA: ICD-10-CM

## 2024-09-23 DIAGNOSIS — H25.812 COMBINED FORM OF AGE-RELATED CATARACT, LEFT EYE: ICD-10-CM

## 2024-09-23 DIAGNOSIS — H52.203 ASTIGMATISM OF BOTH EYES, UNSPECIFIED TYPE: ICD-10-CM

## 2024-09-23 DIAGNOSIS — H01.001 BLEPHARITIS OF UPPER EYELIDS OF BOTH EYES, UNSPECIFIED TYPE: ICD-10-CM

## 2024-09-23 DIAGNOSIS — H52.4 PRESBYOPIA: ICD-10-CM

## 2024-09-23 DIAGNOSIS — H35.033 HYPERTENSIVE RETINOPATHY OF BOTH EYES: ICD-10-CM

## 2024-09-23 DIAGNOSIS — H43.811 PVD (POSTERIOR VITREOUS DETACHMENT), RIGHT EYE: ICD-10-CM

## 2024-09-23 DIAGNOSIS — H52.13 MYOPIA OF BOTH EYES: ICD-10-CM

## 2024-09-23 DIAGNOSIS — R73.03 PREDIABETES: ICD-10-CM

## 2024-09-23 PROCEDURE — 99214 OFFICE O/P EST MOD 30 MIN: CPT | Performed by: OPHTHALMOLOGY

## 2024-09-23 PROCEDURE — 92136 OPHTHALMIC BIOMETRY: CPT | Mod: BILATERAL PROCEDURE | Performed by: OPHTHALMOLOGY

## 2024-09-23 PROCEDURE — 92133 CPTRZD OPH DX IMG PST SGM ON: CPT | Performed by: OPHTHALMOLOGY

## 2024-09-23 RX ORDER — AZELASTINE HYDROCHLORIDE 0.5 MG/ML
SOLUTION/ DROPS OPHTHALMIC
Qty: 6 ML | Refills: 6 | Status: SHIPPED | OUTPATIENT
Start: 2024-09-23

## 2024-09-23 ASSESSMENT — CONF VISUAL FIELD
OD_INFERIOR_NASAL_RESTRICTION: 0
OS_SUPERIOR_TEMPORAL_RESTRICTION: 0
OS_INFERIOR_TEMPORAL_RESTRICTION: 0
OS_INFERIOR_NASAL_RESTRICTION: 0
OS_SUPERIOR_NASAL_RESTRICTION: 0
OD_INFERIOR_TEMPORAL_RESTRICTION: 0
OD_NORMAL: 1
OS_NORMAL: 1
OD_SUPERIOR_TEMPORAL_RESTRICTION: 0
OD_SUPERIOR_NASAL_RESTRICTION: 0

## 2024-09-23 ASSESSMENT — REFRACTION_MANIFEST
OD_AXIS: 090
OD_ADD: +2.50
OS_AXIS: 085
OS_SPHERE: -3.00
OD_CYLINDER: -0.75
OD_SPHERE: -2.25
OS_CYLINDER: -1.75
OS_ADD: +2.50

## 2024-09-23 ASSESSMENT — VISUAL ACUITY
OS_CC: 20/30
METHOD: SNELLEN - LINEAR
OD_CC: 20/20
OS_BAT_MED: 20/50
CORRECTION_TYPE: GLASSES
OD_CC+: -1

## 2024-09-23 ASSESSMENT — PACHYMETRY
OD_CT(UM): 510
OS_CT(UM): 520

## 2024-09-23 ASSESSMENT — REFRACTION_WEARINGRX
OD_SPHERE: -2.50
OS_ADD: +2.50
OD_ADD: +2.50
OS_AXIS: 085
SPECS_TYPE: BIFOCAL
OD_CYLINDER: -0.50
OS_SPHERE: -3.00
OD_AXIS: 090
OS_CYLINDER: -1.50

## 2024-09-23 ASSESSMENT — TONOMETRY
OS_IOP_MMHG: 15
IOP_METHOD: GOLDMANN APPLANATION
OD_IOP_MMHG: 14

## 2024-09-23 ASSESSMENT — ENCOUNTER SYMPTOMS: EYES NEGATIVE: 1

## 2024-09-25 DIAGNOSIS — H40.003 GLAUCOMA SUSPECT OF BOTH EYES: ICD-10-CM

## 2024-09-25 RX ORDER — LATANOPROST 50 UG/ML
1 SOLUTION/ DROPS OPHTHALMIC NIGHTLY
Qty: 7.5 ML | Refills: 1 | Status: SHIPPED | OUTPATIENT
Start: 2024-09-25

## 2024-10-05 ENCOUNTER — OFFICE VISIT (OUTPATIENT)
Dept: URGENT CARE | Age: 74
End: 2024-10-05
Payer: MEDICARE

## 2024-10-05 VITALS
HEART RATE: 76 BPM | TEMPERATURE: 98.9 F | DIASTOLIC BLOOD PRESSURE: 91 MMHG | OXYGEN SATURATION: 96 % | SYSTOLIC BLOOD PRESSURE: 158 MMHG | RESPIRATION RATE: 17 BRPM

## 2024-10-05 DIAGNOSIS — Z87.19 HISTORY OF CHOLELITHIASIS: ICD-10-CM

## 2024-10-05 DIAGNOSIS — R10.821 RIGHT UPPER QUADRANT ABDOMINAL TENDERNESS WITH REBOUND TENDERNESS: Primary | ICD-10-CM

## 2024-10-05 ASSESSMENT — PAIN SCALES - GENERAL: PAINLEVEL: 10-WORST PAIN EVER

## 2024-10-05 NOTE — PROGRESS NOTES
Subjective   Patient ID: Taylor Richard is a 74 y.o. female. They present today with a chief complaint of Abdominal Pain and Constipation.    History of Present Illness  HPI    As noted above with correction.  Patient has known history of cholelithiasis for years.  Patient states that she has been having right upper quadrant pain radiating to right upper back that has been constant for the past 4 days.  Yesterday it has gotten worse.  Patient denies nausea, vomiting, fever, anorexia.  In addition, she has been having loose stools for the past 2 weeks.  She also has increased flatulence.  Patient states she is about to get a colonoscopy which is her routine screening test due to family history of colon cancer.     Reviewed her relevant record.  She is on metformin for diabetes.  Her last ultrasound was 6/20/2024, showed cholelithiasis.  LFTs and lipase were unremarkable.  Last CBC was 7/25/2024, stable.    Past Medical History  Allergies as of 10/05/2024 - Reviewed 10/05/2024   Allergen Reaction Noted    Acetaminophen Other 05/26/2023    Isosorbide Hives 05/26/2023    Pravastatin Nausea Only and Unknown 05/26/2023    Codeine Unknown and Rash 05/26/2023    Losartan Rash 05/26/2023    Penicillins Unknown and Rash 05/26/2023       (Not in a hospital admission)       Past Medical History:   Diagnosis Date    Acute candidiasis of vulva and vagina 01/06/2017    Vaginal candida    Age-related nuclear cataract, left eye 05/21/2018    Age-related nuclear cataract, left    Age-related nuclear cataract, right eye 01/10/2018    Age-related nuclear cataract, right    Cellulitis of left lower limb 05/27/2021    Cellulitis of left lower extremity    Chronic viral hepatitis C (Multi) 08/14/2017    Chronic viral hepatitis C    Combined forms of age-related cataract, bilateral 12/13/2017    Combined form of age-related cataract, both eyes    Contusion of right eyelid and periocular area, initial encounter 01/10/2018    Periorbital  ecchymosis of right eye    Cortical age-related cataract, left eye 05/21/2018    Cortical age-related cataract of left eye    Cortical age-related cataract, right eye 01/10/2018    Cortical age-related cataract of right eye    Edema of larynx 12/30/2016    Vocal cord edema    Encounter for immunization 05/26/2021    Encounter for immunization    Hypertensive retinopathy, bilateral 12/19/2022    Hypertensive retinopathy of both eyes    Meibomian gland dysfunction of unspecified eye, unspecified eyelid 12/19/2022    Meibomian gland dysfunction (MGD)    Myopia, bilateral 12/19/2022    Myopia of both eyes with astigmatism and presbyopia    Ocular hypertension, unspecified eye 01/15/2016    Elevated IOP    Ocular pain, right eye 01/10/2018    Pain of right orbit    Other conditions influencing health status 05/21/2018    History of cough    Other pulmonary collapse 06/28/2016    Collapsed lung    Other specified noninflammatory disorders of vulva and perineum 12/15/2014    Vulvar lesion    Pain in right shoulder 04/06/2016    Right shoulder pain    Pain in unspecified ankle and joints of unspecified foot 10/27/2015    Ankle joint pain    Personal history of nicotine dependence 01/04/2023    Former cigarette smoker    Personal history of other diseases of the digestive system 08/17/2017    History of cholelithiasis    Personal history of other diseases of the respiratory system 05/03/2018    History of acute bronchitis    Personal history of other infectious and parasitic diseases 09/15/2021    History of candidiasis of vagina    Personal history of other infectious and parasitic diseases     History of hepatitis    Personal history of other specified conditions 08/14/2017    History of nausea    Personal history of other specified conditions 03/27/2018    History of abdominal pain    Prediabetes 12/19/2022    Pre-diabetes    Preglaucoma, unspecified, bilateral 12/19/2022    Glaucoma suspect of both eyes    Presence of  intraocular lens 02/15/2018    Bilateral pseudophakia    Presence of intraocular lens 12/19/2022    Pseudophakia of right eye    Right upper quadrant pain 10/14/2021    Right upper quadrant pain    Right upper quadrant pain 10/14/2021    RUQ pain    Strain of unspecified muscle, fascia and tendon at shoulder and upper arm level, right arm, initial encounter 04/06/2016    Right shoulder strain    Systemic lupus erythematosus, unspecified 12/19/2022    Lupus (systemic lupus erythematosus)    Systemic lupus erythematosus, unspecified 11/29/2017    History of systemic lupus erythematosus (SLE)    Type 2 diabetes mellitus without complications (Multi) 01/04/2023    Diabetes mellitus    Type 2 diabetes mellitus without complications (Multi) 12/13/2017    Controlled diabetes mellitus type II without complication    Type 2 diabetes mellitus without complications (Multi) 10/31/2017    Controlled diabetes mellitus type II without complication    Unspecified symptoms and signs involving the genitourinary system 09/15/2021    UTI symptoms    Unspecified visual field defects 04/27/2016    Visual field defect       Past Surgical History:   Procedure Laterality Date    CT ANGIO CORONARY ART WITH HEARTFLOW IF SCORE >30%  2/18/2019    CT HEART CORONARY ANGIOGRAM 2/18/2019 [facility]    OTHER SURGICAL HISTORY  10/14/2021    Complete colonoscopy    OTHER SURGICAL HISTORY  10/14/2021    Endoscopy    OTHER SURGICAL HISTORY  10/14/2021    Oral surgery    OTHER SURGICAL HISTORY  03/17/2022    Cardiac catheterization with stent placement    TOTAL ABDOMINAL HYSTERECTOMY W/ BILATERAL SALPINGOOPHORECTOMY  11/07/2014    Total Abdominal Hysterectomy With Removal Of Both Ovaries        reports that she has never smoked. She has never used smokeless tobacco. She reports current alcohol use. She reports that she does not use drugs.    Review of Systems  Review of Systems                               Objective    Vitals:    10/05/24 1427   BP:  (!) 158/91   BP Location: Left arm   Patient Position: Sitting   Pulse: 76   Resp: 17   Temp: 37.2 °C (98.9 °F)   TempSrc: Oral   SpO2: 96%     No LMP recorded.    Physical Exam    Constitutional: Vital signs reviewed. Well developed and well nourished. Patient alert and without distress.  In pain.    Cardiovascular: No peripheral edema.    Pulmonary: No respiratory distress.     Abdomen: Normal to hyperactive bowel sounds, abdomen is rotund, mildly distended but soft.  Patient states this is chronic.  Tender in the right upper quadrant area with rebound tenderness and positive Barker's test.  Negative CVA tenderness.    Neurologic: Cortical function: Normal        Procedures    Point of Care Test & Imaging Results from this visit  No results found for this visit on 10/05/24.   No results found.    Diagnostic study results (if any) were reviewed by Teresa Steiner.    Assessment/Plan   Allergies, medications, history, and pertinent labs/EKGs/Imaging reviewed by Teresa Steiner.     Medical Decision Making  Need to rule out cholangitis in this 74-year-old with known history of cholelithiasis.  Patient prefers not to go to ED.  I discussed the risks and encouraged her to go to ED despite signing AMA form.  She signed AMA.  She did accept and offered to be referred to general surgery.  Patient is inquiring about pain medication.  I recommended acetaminophen, do not recommend narcotics until cholangitis is ruled out.      Orders and Diagnoses  Diagnoses and all orders for this visit:  Right upper quadrant abdominal tenderness with rebound tenderness  -     Referral to General Surgery; Future  History of cholelithiasis  -     Referral to General Surgery; Future      Medical Admin Record      Patient disposition: Home    Electronically signed by Teresa Steiner  2:57 PM

## 2024-10-06 ENCOUNTER — APPOINTMENT (OUTPATIENT)
Dept: CARDIOLOGY | Facility: HOSPITAL | Age: 74
End: 2024-10-06
Payer: MEDICARE

## 2024-10-06 ENCOUNTER — HOSPITAL ENCOUNTER (OUTPATIENT)
Facility: HOSPITAL | Age: 74
Setting detail: OBSERVATION
End: 2024-10-06
Attending: STUDENT IN AN ORGANIZED HEALTH CARE EDUCATION/TRAINING PROGRAM | Admitting: INTERNAL MEDICINE
Payer: MEDICARE

## 2024-10-06 ENCOUNTER — APPOINTMENT (OUTPATIENT)
Dept: RADIOLOGY | Facility: HOSPITAL | Age: 74
End: 2024-10-06
Payer: MEDICARE

## 2024-10-06 VITALS
HEART RATE: 87 BPM | OXYGEN SATURATION: 99 % | SYSTOLIC BLOOD PRESSURE: 138 MMHG | BODY MASS INDEX: 28.32 KG/M2 | RESPIRATION RATE: 18 BRPM | WEIGHT: 150 LBS | TEMPERATURE: 96.6 F | DIASTOLIC BLOOD PRESSURE: 79 MMHG | HEIGHT: 61 IN

## 2024-10-06 DIAGNOSIS — K83.1 OBSTRUCTION OF BILE DUCT (CMS-HCC): ICD-10-CM

## 2024-10-06 DIAGNOSIS — K80.20 SYMPTOMATIC CHOLELITHIASIS: Primary | ICD-10-CM

## 2024-10-06 DIAGNOSIS — E83.42 HYPOMAGNESEMIA: ICD-10-CM

## 2024-10-06 DIAGNOSIS — K80.20 CALCULUS OF GALLBLADDER WITHOUT CHOLECYSTITIS WITHOUT OBSTRUCTION: ICD-10-CM

## 2024-10-06 DIAGNOSIS — K81.9 CHOLECYSTITIS: ICD-10-CM

## 2024-10-06 LAB
ABO GROUP (TYPE) IN BLOOD: NORMAL
ALBUMIN SERPL BCP-MCNC: 3.9 G/DL (ref 3.4–5)
ALBUMIN SERPL BCP-MCNC: 4.4 G/DL (ref 3.4–5)
ALP SERPL-CCNC: 67 U/L (ref 33–136)
ALP SERPL-CCNC: 75 U/L (ref 33–136)
ALT SERPL W P-5'-P-CCNC: 7 U/L (ref 7–45)
ALT SERPL W P-5'-P-CCNC: 9 U/L (ref 7–45)
ANION GAP SERPL CALC-SCNC: 16 MMOL/L (ref 10–20)
ANTIBODY SCREEN: NORMAL
APPEARANCE UR: CLEAR
AST SERPL W P-5'-P-CCNC: 15 U/L (ref 9–39)
AST SERPL W P-5'-P-CCNC: 32 U/L (ref 9–39)
BASOPHILS # BLD AUTO: 0.08 X10*3/UL (ref 0–0.1)
BASOPHILS NFR BLD AUTO: 0.8 %
BILIRUB DIRECT SERPL-MCNC: 0.1 MG/DL (ref 0–0.3)
BILIRUB SERPL-MCNC: 0.2 MG/DL (ref 0–1.2)
BILIRUB SERPL-MCNC: 0.2 MG/DL (ref 0–1.2)
BILIRUB UR STRIP.AUTO-MCNC: NEGATIVE MG/DL
BUN SERPL-MCNC: 15 MG/DL (ref 6–23)
CALCIUM SERPL-MCNC: 9.5 MG/DL (ref 8.6–10.3)
CARDIAC TROPONIN I PNL SERPL HS: 6 NG/L (ref 0–13)
CARDIAC TROPONIN I PNL SERPL HS: 8 NG/L (ref 0–13)
CHLORIDE SERPL-SCNC: 104 MMOL/L (ref 98–107)
CO2 SERPL-SCNC: 23 MMOL/L (ref 21–32)
COLOR UR: ABNORMAL
CREAT SERPL-MCNC: 0.82 MG/DL (ref 0.5–1.05)
EGFRCR SERPLBLD CKD-EPI 2021: 75 ML/MIN/1.73M*2
EOSINOPHIL # BLD AUTO: 0.21 X10*3/UL (ref 0–0.4)
EOSINOPHIL NFR BLD AUTO: 2.2 %
ERYTHROCYTE [DISTWIDTH] IN BLOOD BY AUTOMATED COUNT: 14.8 % (ref 11.5–14.5)
GLUCOSE BLD MANUAL STRIP-MCNC: 169 MG/DL (ref 74–99)
GLUCOSE SERPL-MCNC: 136 MG/DL (ref 74–99)
GLUCOSE UR STRIP.AUTO-MCNC: NORMAL MG/DL
HCT VFR BLD AUTO: 36.6 % (ref 36–46)
HGB BLD-MCNC: 11.8 G/DL (ref 12–16)
IMM GRANULOCYTES # BLD AUTO: 0.03 X10*3/UL (ref 0–0.5)
IMM GRANULOCYTES NFR BLD AUTO: 0.3 % (ref 0–0.9)
INR PPP: 1 (ref 0.9–1.1)
KETONES UR STRIP.AUTO-MCNC: NEGATIVE MG/DL
LEUKOCYTE ESTERASE UR QL STRIP.AUTO: NEGATIVE
LIPASE SERPL-CCNC: 72 U/L (ref 9–82)
LYMPHOCYTES # BLD AUTO: 4.77 X10*3/UL (ref 0.8–3)
LYMPHOCYTES NFR BLD AUTO: 49.6 %
MAGNESIUM SERPL-MCNC: 1.3 MG/DL (ref 1.6–2.4)
MAGNESIUM SERPL-MCNC: 1.5 MG/DL (ref 1.6–2.4)
MCH RBC QN AUTO: 24.7 PG (ref 26–34)
MCHC RBC AUTO-ENTMCNC: 32.2 G/DL (ref 32–36)
MCV RBC AUTO: 77 FL (ref 80–100)
MONOCYTES # BLD AUTO: 0.52 X10*3/UL (ref 0.05–0.8)
MONOCYTES NFR BLD AUTO: 5.4 %
MUCOUS THREADS #/AREA URNS AUTO: NORMAL /LPF
NEUTROPHILS # BLD AUTO: 4 X10*3/UL (ref 1.6–5.5)
NEUTROPHILS NFR BLD AUTO: 41.7 %
NITRITE UR QL STRIP.AUTO: NEGATIVE
NRBC BLD-RTO: 0 /100 WBCS (ref 0–0)
PH UR STRIP.AUTO: 5.5 [PH]
PLATELET # BLD AUTO: 259 X10*3/UL (ref 150–450)
POTASSIUM SERPL-SCNC: 4.1 MMOL/L (ref 3.5–5.3)
POTASSIUM SERPL-SCNC: 6.2 MMOL/L (ref 3.5–5.3)
PROT SERPL-MCNC: 6.8 G/DL (ref 6.4–8.2)
PROT SERPL-MCNC: 8.1 G/DL (ref 6.4–8.2)
PROT UR STRIP.AUTO-MCNC: ABNORMAL MG/DL
PROTHROMBIN TIME: 11.3 SECONDS (ref 9.8–12.8)
RBC # BLD AUTO: 4.77 X10*6/UL (ref 4–5.2)
RBC # UR STRIP.AUTO: ABNORMAL /UL
RBC #/AREA URNS AUTO: NORMAL /HPF
RH FACTOR (ANTIGEN D): NORMAL
SODIUM SERPL-SCNC: 137 MMOL/L (ref 136–145)
SP GR UR STRIP.AUTO: >1.05
SQUAMOUS #/AREA URNS AUTO: NORMAL /HPF
UROBILINOGEN UR STRIP.AUTO-MCNC: NORMAL MG/DL
WBC # BLD AUTO: 9.6 X10*3/UL (ref 4.4–11.3)
WBC #/AREA URNS AUTO: NORMAL /HPF

## 2024-10-06 PROCEDURE — 84484 ASSAY OF TROPONIN QUANT: CPT | Performed by: STUDENT IN AN ORGANIZED HEALTH CARE EDUCATION/TRAINING PROGRAM

## 2024-10-06 PROCEDURE — 83690 ASSAY OF LIPASE: CPT | Performed by: STUDENT IN AN ORGANIZED HEALTH CARE EDUCATION/TRAINING PROGRAM

## 2024-10-06 PROCEDURE — 2500000004 HC RX 250 GENERAL PHARMACY W/ HCPCS (ALT 636 FOR OP/ED): Performed by: NURSE PRACTITIONER

## 2024-10-06 PROCEDURE — 86901 BLOOD TYPING SEROLOGIC RH(D): CPT | Performed by: STUDENT IN AN ORGANIZED HEALTH CARE EDUCATION/TRAINING PROGRAM

## 2024-10-06 PROCEDURE — 82947 ASSAY GLUCOSE BLOOD QUANT: CPT

## 2024-10-06 PROCEDURE — 36415 COLL VENOUS BLD VENIPUNCTURE: CPT | Performed by: STUDENT IN AN ORGANIZED HEALTH CARE EDUCATION/TRAINING PROGRAM

## 2024-10-06 PROCEDURE — 99285 EMERGENCY DEPT VISIT HI MDM: CPT

## 2024-10-06 PROCEDURE — 96366 THER/PROPH/DIAG IV INF ADDON: CPT

## 2024-10-06 PROCEDURE — 74177 CT ABD & PELVIS W/CONTRAST: CPT

## 2024-10-06 PROCEDURE — 2550000001 HC RX 255 CONTRASTS: Performed by: STUDENT IN AN ORGANIZED HEALTH CARE EDUCATION/TRAINING PROGRAM

## 2024-10-06 PROCEDURE — G0378 HOSPITAL OBSERVATION PER HR: HCPCS

## 2024-10-06 PROCEDURE — 74177 CT ABD & PELVIS W/CONTRAST: CPT | Performed by: RADIOLOGY

## 2024-10-06 PROCEDURE — 76705 ECHO EXAM OF ABDOMEN: CPT | Performed by: RADIOLOGY

## 2024-10-06 PROCEDURE — 85025 COMPLETE CBC W/AUTO DIFF WBC: CPT | Performed by: STUDENT IN AN ORGANIZED HEALTH CARE EDUCATION/TRAINING PROGRAM

## 2024-10-06 PROCEDURE — 83735 ASSAY OF MAGNESIUM: CPT | Performed by: STUDENT IN AN ORGANIZED HEALTH CARE EDUCATION/TRAINING PROGRAM

## 2024-10-06 PROCEDURE — 80076 HEPATIC FUNCTION PANEL: CPT | Mod: CCI | Performed by: STUDENT IN AN ORGANIZED HEALTH CARE EDUCATION/TRAINING PROGRAM

## 2024-10-06 PROCEDURE — 96365 THER/PROPH/DIAG IV INF INIT: CPT

## 2024-10-06 PROCEDURE — 93005 ELECTROCARDIOGRAM TRACING: CPT

## 2024-10-06 PROCEDURE — 80053 COMPREHEN METABOLIC PANEL: CPT | Performed by: STUDENT IN AN ORGANIZED HEALTH CARE EDUCATION/TRAINING PROGRAM

## 2024-10-06 PROCEDURE — 84132 ASSAY OF SERUM POTASSIUM: CPT | Performed by: STUDENT IN AN ORGANIZED HEALTH CARE EDUCATION/TRAINING PROGRAM

## 2024-10-06 PROCEDURE — 81003 URINALYSIS AUTO W/O SCOPE: CPT | Performed by: STUDENT IN AN ORGANIZED HEALTH CARE EDUCATION/TRAINING PROGRAM

## 2024-10-06 PROCEDURE — 85610 PROTHROMBIN TIME: CPT | Performed by: STUDENT IN AN ORGANIZED HEALTH CARE EDUCATION/TRAINING PROGRAM

## 2024-10-06 PROCEDURE — 76705 ECHO EXAM OF ABDOMEN: CPT

## 2024-10-06 PROCEDURE — 96375 TX/PRO/DX INJ NEW DRUG ADDON: CPT

## 2024-10-06 PROCEDURE — 2500000004 HC RX 250 GENERAL PHARMACY W/ HCPCS (ALT 636 FOR OP/ED): Performed by: STUDENT IN AN ORGANIZED HEALTH CARE EDUCATION/TRAINING PROGRAM

## 2024-10-06 RX ORDER — MORPHINE SULFATE 4 MG/ML
4 INJECTION, SOLUTION INTRAMUSCULAR; INTRAVENOUS ONCE
Status: COMPLETED | OUTPATIENT
Start: 2024-10-06 | End: 2024-10-06

## 2024-10-06 RX ORDER — ONDANSETRON HYDROCHLORIDE 2 MG/ML
4 INJECTION, SOLUTION INTRAVENOUS EVERY 6 HOURS PRN
Status: ACTIVE | OUTPATIENT
Start: 2024-10-06

## 2024-10-06 RX ORDER — PANTOPRAZOLE SODIUM 40 MG/1
40 TABLET, DELAYED RELEASE ORAL
Status: ACTIVE | OUTPATIENT
Start: 2024-10-07

## 2024-10-06 RX ORDER — ENOXAPARIN SODIUM 100 MG/ML
40 INJECTION SUBCUTANEOUS EVERY 24 HOURS
Status: ACTIVE | OUTPATIENT
Start: 2024-10-06

## 2024-10-06 RX ORDER — ONDANSETRON 4 MG/1
4 TABLET, ORALLY DISINTEGRATING ORAL EVERY 6 HOURS PRN
Status: ACTIVE | OUTPATIENT
Start: 2024-10-06

## 2024-10-06 RX ORDER — MAGNESIUM SULFATE HEPTAHYDRATE 40 MG/ML
2 INJECTION, SOLUTION INTRAVENOUS ONCE
Status: COMPLETED | OUTPATIENT
Start: 2024-10-06 | End: 2024-10-06

## 2024-10-06 RX ORDER — TALC
3 POWDER (GRAM) TOPICAL NIGHTLY PRN
Status: ACTIVE | OUTPATIENT
Start: 2024-10-06

## 2024-10-06 RX ORDER — FAMOTIDINE 10 MG/ML
20 INJECTION INTRAVENOUS ONCE
Status: COMPLETED | OUTPATIENT
Start: 2024-10-06 | End: 2024-10-06

## 2024-10-06 RX ORDER — ONDANSETRON HYDROCHLORIDE 2 MG/ML
4 INJECTION, SOLUTION INTRAVENOUS ONCE
Status: ACTIVE | OUTPATIENT
Start: 2024-10-06

## 2024-10-06 RX ORDER — GUAIFENESIN/DEXTROMETHORPHAN 100-10MG/5
5 SYRUP ORAL EVERY 4 HOURS PRN
Status: DISPENSED | OUTPATIENT
Start: 2024-10-06

## 2024-10-06 RX ORDER — BISACODYL 10 MG/1
10 SUPPOSITORY RECTAL DAILY PRN
Status: DISPENSED | OUTPATIENT
Start: 2024-10-06

## 2024-10-06 RX ORDER — POLYETHYLENE GLYCOL 3350 17 G/17G
17 POWDER, FOR SOLUTION ORAL DAILY PRN
Status: DISCONTINUED | OUTPATIENT
Start: 2024-10-06 | End: 2024-10-06

## 2024-10-06 RX ORDER — KETOROLAC TROMETHAMINE 30 MG/ML
15 INJECTION, SOLUTION INTRAMUSCULAR; INTRAVENOUS EVERY 8 HOURS PRN
Status: ACTIVE | OUTPATIENT
Start: 2024-10-06 | End: 2024-10-07

## 2024-10-06 RX ORDER — POLYETHYLENE GLYCOL 3350 17 G/17G
17 POWDER, FOR SOLUTION ORAL 2 TIMES DAILY
Status: DISPENSED | OUTPATIENT
Start: 2024-10-06

## 2024-10-06 RX ORDER — ACETAMINOPHEN 160 MG/5ML
650 SOLUTION ORAL EVERY 4 HOURS PRN
Status: ACTIVE | OUTPATIENT
Start: 2024-10-06

## 2024-10-06 RX ORDER — MORPHINE SULFATE 2 MG/ML
1 INJECTION, SOLUTION INTRAMUSCULAR; INTRAVENOUS EVERY 4 HOURS PRN
Status: ACTIVE | OUTPATIENT
Start: 2024-10-06

## 2024-10-06 RX ORDER — ALUMINUM HYDROXIDE, MAGNESIUM HYDROXIDE, AND SIMETHICONE 1200; 120; 1200 MG/30ML; MG/30ML; MG/30ML
30 SUSPENSION ORAL EVERY 6 HOURS PRN
Status: DISPENSED | OUTPATIENT
Start: 2024-10-06

## 2024-10-06 RX ORDER — ALBUTEROL SULFATE 0.83 MG/ML
2.5 SOLUTION RESPIRATORY (INHALATION) EVERY 2 HOUR PRN
Status: ACTIVE | OUTPATIENT
Start: 2024-10-06

## 2024-10-06 RX ORDER — ALBUTEROL SULFATE 90 UG/1
2 INHALANT RESPIRATORY (INHALATION) EVERY 4 HOURS PRN
Status: DISCONTINUED | OUTPATIENT
Start: 2024-10-06 | End: 2024-10-06

## 2024-10-06 RX ORDER — LATANOPROST 50 UG/ML
1 SOLUTION/ DROPS OPHTHALMIC NIGHTLY
Status: DISPENSED | OUTPATIENT
Start: 2024-10-06

## 2024-10-06 RX ORDER — NITROGLYCERIN 0.4 MG/1
0.4 TABLET SUBLINGUAL EVERY 5 MIN PRN
Status: ACTIVE | OUTPATIENT
Start: 2024-10-06

## 2024-10-06 RX ORDER — ONDANSETRON HYDROCHLORIDE 2 MG/ML
4 INJECTION, SOLUTION INTRAVENOUS ONCE
Status: COMPLETED | OUTPATIENT
Start: 2024-10-06 | End: 2024-10-06

## 2024-10-06 RX ORDER — ALBUTEROL SULFATE 0.83 MG/ML
2.5 SOLUTION RESPIRATORY (INHALATION) EVERY 4 HOURS PRN
Status: DISCONTINUED | OUTPATIENT
Start: 2024-10-06 | End: 2024-10-06

## 2024-10-06 RX ORDER — EZETIMIBE 10 MG/1
10 TABLET ORAL DAILY
Status: DISPENSED | OUTPATIENT
Start: 2024-10-06

## 2024-10-06 RX ORDER — ASPIRIN 81 MG/1
81 TABLET ORAL DAILY
Status: DISPENSED | OUTPATIENT
Start: 2024-10-06

## 2024-10-06 RX ORDER — ACETAMINOPHEN 325 MG/1
650 TABLET ORAL EVERY 4 HOURS PRN
Status: ACTIVE | OUTPATIENT
Start: 2024-10-06

## 2024-10-06 RX ORDER — ACETAMINOPHEN 650 MG/1
650 SUPPOSITORY RECTAL EVERY 4 HOURS PRN
Status: ACTIVE | OUTPATIENT
Start: 2024-10-06

## 2024-10-06 RX ADMIN — FAMOTIDINE 20 MG: 10 INJECTION, SOLUTION INTRAVENOUS at 10:23

## 2024-10-06 RX ADMIN — MORPHINE SULFATE 4 MG: 4 INJECTION, SOLUTION INTRAMUSCULAR; INTRAVENOUS at 10:23

## 2024-10-06 RX ADMIN — MAGNESIUM SULFATE HEPTAHYDRATE 2 G: 40 INJECTION, SOLUTION INTRAVENOUS at 13:15

## 2024-10-06 RX ADMIN — ONDANSETRON 4 MG: 2 INJECTION, SOLUTION INTRAMUSCULAR; INTRAVENOUS at 10:23

## 2024-10-06 RX ADMIN — POLYETHYLENE GLYCOL 3350 17 G: 17 POWDER, FOR SOLUTION ORAL at 21:08

## 2024-10-06 RX ADMIN — IOHEXOL 75 ML: 350 INJECTION, SOLUTION INTRAVENOUS at 14:12

## 2024-10-06 SDOH — SOCIAL STABILITY: SOCIAL INSECURITY
WITHIN THE LAST YEAR, HAVE YOU BEEN KICKED, HIT, SLAPPED, OR OTHERWISE PHYSICALLY HURT BY YOUR PARTNER OR EX-PARTNER?: PATIENT DECLINED

## 2024-10-06 SDOH — ECONOMIC STABILITY: TRANSPORTATION INSECURITY
IN THE PAST 12 MONTHS, HAS THE LACK OF TRANSPORTATION KEPT YOU FROM MEDICAL APPOINTMENTS OR FROM GETTING MEDICATIONS?: PATIENT DECLINED

## 2024-10-06 SDOH — SOCIAL STABILITY: SOCIAL INSECURITY: ARE THERE ANY APPARENT SIGNS OF INJURIES/BEHAVIORS THAT COULD BE RELATED TO ABUSE/NEGLECT?: NO

## 2024-10-06 SDOH — SOCIAL STABILITY: SOCIAL INSECURITY: ABUSE: ADULT

## 2024-10-06 SDOH — SOCIAL STABILITY: SOCIAL INSECURITY
WITHIN THE LAST YEAR, HAVE TO BEEN RAPED OR FORCED TO HAVE ANY KIND OF SEXUAL ACTIVITY BY YOUR PARTNER OR EX-PARTNER?: PATIENT DECLINED

## 2024-10-06 SDOH — HEALTH STABILITY: PHYSICAL HEALTH: ON AVERAGE, HOW MANY DAYS PER WEEK DO YOU ENGAGE IN MODERATE TO STRENUOUS EXERCISE (LIKE A BRISK WALK)?: 7 DAYS

## 2024-10-06 SDOH — SOCIAL STABILITY: SOCIAL INSECURITY: WITHIN THE LAST YEAR, HAVE YOU BEEN AFRAID OF YOUR PARTNER OR EX-PARTNER?: PATIENT DECLINED

## 2024-10-06 SDOH — ECONOMIC STABILITY: HOUSING INSECURITY: AT ANY TIME IN THE PAST 12 MONTHS, WERE YOU HOMELESS OR LIVING IN A SHELTER (INCLUDING NOW)?: PATIENT DECLINED

## 2024-10-06 SDOH — HEALTH STABILITY: MENTAL HEALTH
HOW OFTEN DO YOU NEED TO HAVE SOMEONE HELP YOU WHEN YOU READ INSTRUCTIONS, PAMPHLETS, OR OTHER WRITTEN MATERIAL FROM YOUR DOCTOR OR PHARMACY?: NEVER

## 2024-10-06 SDOH — ECONOMIC STABILITY: TRANSPORTATION INSECURITY
IN THE PAST 12 MONTHS, HAS LACK OF TRANSPORTATION KEPT YOU FROM MEETINGS, WORK, OR FROM GETTING THINGS NEEDED FOR DAILY LIVING?: PATIENT DECLINED

## 2024-10-06 SDOH — SOCIAL STABILITY: SOCIAL INSECURITY: HAVE YOU HAD THOUGHTS OF HARMING ANYONE ELSE?: NO

## 2024-10-06 SDOH — SOCIAL STABILITY: SOCIAL INSECURITY: HAS ANYONE EVER THREATENED TO HURT YOUR FAMILY OR YOUR PETS?: NO

## 2024-10-06 SDOH — ECONOMIC STABILITY: INCOME INSECURITY: HOW HARD IS IT FOR YOU TO PAY FOR THE VERY BASICS LIKE FOOD, HOUSING, MEDICAL CARE, AND HEATING?: PATIENT DECLINED

## 2024-10-06 SDOH — SOCIAL STABILITY: SOCIAL INSECURITY: DOES ANYONE TRY TO KEEP YOU FROM HAVING/CONTACTING OTHER FRIENDS OR DOING THINGS OUTSIDE YOUR HOME?: NO

## 2024-10-06 SDOH — HEALTH STABILITY: MENTAL HEALTH
STRESS IS WHEN SOMEONE FEELS TENSE, NERVOUS, ANXIOUS, OR CAN'T SLEEP AT NIGHT BECAUSE THEIR MIND IS TROUBLED. HOW STRESSED ARE YOU?: NOT AT ALL

## 2024-10-06 SDOH — ECONOMIC STABILITY: FOOD INSECURITY: WITHIN THE PAST 12 MONTHS, THE FOOD YOU BOUGHT JUST DIDN'T LAST AND YOU DIDN'T HAVE MONEY TO GET MORE.: PATIENT DECLINED

## 2024-10-06 SDOH — SOCIAL STABILITY: SOCIAL INSECURITY
WITHIN THE LAST YEAR, HAVE YOU BEEN HUMILIATED OR EMOTIONALLY ABUSED IN OTHER WAYS BY YOUR PARTNER OR EX-PARTNER?: PATIENT DECLINED

## 2024-10-06 SDOH — ECONOMIC STABILITY: INCOME INSECURITY: IN THE LAST 12 MONTHS, WAS THERE A TIME WHEN YOU WERE NOT ABLE TO PAY THE MORTGAGE OR RENT ON TIME?: PATIENT DECLINED

## 2024-10-06 SDOH — ECONOMIC STABILITY: INCOME INSECURITY
IN THE PAST 12 MONTHS, HAS THE ELECTRIC, GAS, OIL, OR WATER COMPANY THREATENED TO SHUT OFF SERVICE IN YOUR HOME?: PATIENT DECLINED

## 2024-10-06 SDOH — HEALTH STABILITY: PHYSICAL HEALTH: ON AVERAGE, HOW MANY MINUTES DO YOU ENGAGE IN EXERCISE AT THIS LEVEL?: 60 MIN

## 2024-10-06 SDOH — SOCIAL STABILITY: SOCIAL INSECURITY: DO YOU FEEL ANYONE HAS EXPLOITED OR TAKEN ADVANTAGE OF YOU FINANCIALLY OR OF YOUR PERSONAL PROPERTY?: NO

## 2024-10-06 SDOH — SOCIAL STABILITY: SOCIAL INSECURITY: HAVE YOU HAD ANY THOUGHTS OF HARMING ANYONE ELSE?: NO

## 2024-10-06 SDOH — ECONOMIC STABILITY: FOOD INSECURITY: WITHIN THE PAST 12 MONTHS, YOU WORRIED THAT YOUR FOOD WOULD RUN OUT BEFORE YOU GOT MONEY TO BUY MORE.: PATIENT DECLINED

## 2024-10-06 SDOH — SOCIAL STABILITY: SOCIAL INSECURITY: ARE YOU OR HAVE YOU BEEN THREATENED OR ABUSED PHYSICALLY, EMOTIONALLY, OR SEXUALLY BY ANYONE?: NO

## 2024-10-06 SDOH — SOCIAL STABILITY: SOCIAL INSECURITY: DO YOU FEEL UNSAFE GOING BACK TO THE PLACE WHERE YOU ARE LIVING?: NO

## 2024-10-06 ASSESSMENT — ACTIVITIES OF DAILY LIVING (ADL)
PATIENT'S MEMORY ADEQUATE TO SAFELY COMPLETE DAILY ACTIVITIES?: YES
HEARING - LEFT EAR: FUNCTIONAL
ADEQUATE_TO_COMPLETE_ADL: YES
JUDGMENT_ADEQUATE_SAFELY_COMPLETE_DAILY_ACTIVITIES: YES
DRESSING YOURSELF: INDEPENDENT
HEARING - RIGHT EAR: FUNCTIONAL
GROOMING: INDEPENDENT
BATHING: INDEPENDENT
WALKS IN HOME: INDEPENDENT
FEEDING YOURSELF: INDEPENDENT
LACK_OF_TRANSPORTATION: PATIENT DECLINED
TOILETING: INDEPENDENT

## 2024-10-06 ASSESSMENT — LIFESTYLE VARIABLES
HOW OFTEN DURING THE LAST YEAR HAVE YOU NEEDED AN ALCOHOLIC DRINK FIRST THING IN THE MORNING TO GET YOURSELF GOING AFTER A NIGHT OF HEAVY DRINKING: NEVER
AUDIT-C TOTAL SCORE: 3
AUDIT TOTAL SCORE: 0
HOW OFTEN DURING THE LAST YEAR HAVE YOU FAILED TO DO WHAT WAS NORMALLY EXPECTED FROM YOU BECAUSE OF DRINKING: NEVER
HOW OFTEN DO YOU HAVE 6 OR MORE DRINKS ON ONE OCCASION: NEVER
AUDIT TOTAL SCORE: 3
HOW OFTEN DURING THE LAST YEAR HAVE YOU FOUND THAT YOU WERE NOT ABLE TO STOP DRINKING ONCE YOU HAD STARTED: NEVER
HAVE YOU OR SOMEONE ELSE BEEN INJURED AS A RESULT OF YOUR DRINKING: NO
HOW OFTEN DURING THE LAST YEAR HAVE YOU BEEN UNABLE TO REMEMBER WHAT HAPPENED THE NIGHT BEFORE BECAUSE YOU HAD BEEN DRINKING: NEVER
SKIP TO QUESTIONS 9-10: 1
AUDIT-C TOTAL SCORE: 3
HOW OFTEN DURING THE LAST YEAR HAVE YOU HAD A FEELING OF GUILT OR REMORSE AFTER DRINKING: NEVER
HOW OFTEN DO YOU HAVE A DRINK CONTAINING ALCOHOL: 2-3 TIMES A WEEK
HOW MANY STANDARD DRINKS CONTAINING ALCOHOL DO YOU HAVE ON A TYPICAL DAY: 1 OR 2
HAS A RELATIVE, FRIEND, DOCTOR, OR ANOTHER HEALTH PROFESSIONAL EXPRESSED CONCERN ABOUT YOUR DRINKING OR SUGGESTED YOU CUT DOWN: NO

## 2024-10-06 ASSESSMENT — PATIENT HEALTH QUESTIONNAIRE - PHQ9
1. LITTLE INTEREST OR PLEASURE IN DOING THINGS: NOT AT ALL
2. FEELING DOWN, DEPRESSED OR HOPELESS: NOT AT ALL
SUM OF ALL RESPONSES TO PHQ9 QUESTIONS 1 & 2: 0

## 2024-10-06 ASSESSMENT — COGNITIVE AND FUNCTIONAL STATUS - GENERAL
DAILY ACTIVITIY SCORE: 24
PATIENT BASELINE BEDBOUND: NO
MOBILITY SCORE: 24

## 2024-10-06 ASSESSMENT — PAIN DESCRIPTION - PROGRESSION: CLINICAL_PROGRESSION: NOT CHANGED

## 2024-10-06 ASSESSMENT — COLUMBIA-SUICIDE SEVERITY RATING SCALE - C-SSRS
2. HAVE YOU ACTUALLY HAD ANY THOUGHTS OF KILLING YOURSELF?: NO
6. HAVE YOU EVER DONE ANYTHING, STARTED TO DO ANYTHING, OR PREPARED TO DO ANYTHING TO END YOUR LIFE?: NO
1. IN THE PAST MONTH, HAVE YOU WISHED YOU WERE DEAD OR WISHED YOU COULD GO TO SLEEP AND NOT WAKE UP?: NO

## 2024-10-06 ASSESSMENT — PAIN SCALES - GENERAL
PAINLEVEL_OUTOF10: 3
PAINLEVEL_OUTOF10: 0 - NO PAIN

## 2024-10-06 ASSESSMENT — PAIN - FUNCTIONAL ASSESSMENT: PAIN_FUNCTIONAL_ASSESSMENT: 0-10

## 2024-10-06 NOTE — ED PROVIDER NOTES
Emergency Department Provider Note        History of Present Illness     Chief Complaint: RUQ pain  History provided by: Patient  Limitations to History: None  External Chart Review: See ED Course    HPI:  Taylor Richard is a 74 y.o. female with PMHx HTN, CAD, HLD presenting to the ED for right upper quadrant pain.  Patient reports that over the past week she has had an aching and sharp right upper quadrant pain.  Pain was initially only after eating, but over the past 4 days has been constant.  Endorses associated nausea but denies vomiting or diarrhea.  Denies fevers, chills, chest pain, cough, shortness of breath.    Physical Exam   Triage vitals:  T 37.1 °C (98.8 °F)  HR 86  /85  RR 18  O2 96 % None (Room air)    Constitutional: Awake, alert, not in acute distress  HENT: Head atraumatic, mucous membranes moist  Eyes:  Conjunctivae normal  Neck:  Supple  Lungs: Clear to auscultation, breath sounds equal and symmetric, no wheezes, rales, or rhonchi  Heart: Regular rate and rhythm, no murmur, rub or gallop  Abdomen: Soft, mildly TTP in RUQ, nondistended, no rebound or guarding, pos Barker  Extremities: No lower extremity edema  Neuro: Alert, no focal deficit  Skin: Warm, dry  Psych: Calm, cooperative       Medical Decision Making & ED Course   Medical Decision Making:  Taylor Richard is a 74 y.o. female with PMHx as above in HPI who presented to the ED for RUQ pain. Patient was afebrile, hemodynamically stable, and satting on room air on arrival.     Exam as above. Patient nontoxic appearing. Has RUQ TTP with pos Barker's. No rebound or guarding.     I have personally reviewed and interpreted the EKG obtained.  Normal sinus rhythm, rate 67 BPM  Normal axis  AR interval and QRS duration within normal limits.  QTc within normal limits.  No signs of acute ischemia or infarction    RUQ US shows cholelithiasis but no signs of cholecystitis. Pancreatic duct dilation noted for which CT rec by rads.     Labs  below in ED course, notable for CBC: anemia similar to prior, BMP: hypomag, no VALENTINE LFT: no evidence of obstructive biliary pathology, no evidence of acute liver injury, no evidence of pancreatitis, high sensitivity troponin not elevated, doubt ACS,    Mag was replaced.     Presentation consistent with symptomatic cholelithiasis and given that pain is now constant, surgery was consulted and rec admit with plan for possible OR tomorrow.     CT abd/pelvis obtained and shows intra and extrahepatic biliary dilation and pancreatic ductal dilation of unclear etiology for which rads has recommended MRCP.  Could be possible component of choledocholithiasis.     Pt admitted to medicine.   ------------------------------------------------  Independent Test Interpretation: See ED Course  Discussion with Other Providers: See ED Course    ED Course:  ED Course as of 10/06/24 1603   Sun Oct 06, 2024   0938 External chart review:  US abd 6/20/24  IMPRESSION:  The pancreatic tail was obscured.  Cholelithiasis.       [SS]   1036 CBC and Auto Differential(!)  Anemia stable compared to prior, no leukocytosis or thrombocytopenia [SS]   1126 Comprehensive metabolic panel(!!)  Hemolyzed though no VALENTINE [SS]   1126 LIPASE: 72  normal [SS]   1126 Troponin I, High Sensitivity: 6  Not elevated will trend per protocol  [SS]   1149 Personally discussed currently available facts and concerns about the case with [place] with surgery, who advises will eval pt   [SS]   1150 US gallbladder  I have personally reviewed and interpreted this RUQ US, which shows cholelithiasis, no cholecystitis. Final decision making pending radiology read.   [SS]   1227 Hepatic function panel  Not suggestive of obstructive biliary pathology or acute liver injury   [SS]   1227 Troponin I Series, High Sensitivity (0, 1 HR)  Neg doubt ACS [SS]   1227 POTASSIUM: 4.1  normal [SS]   1227 MAGNESIUM(!): 1.30  hypomag [SS]   1243 US gallbladder  Radiology read:  IMPRESSION:  1.  Cholelithiasis.  2.  Dilated appearance of the pancreatic duct measuring 6 mm. The  etiology for this is not clear from this study and correlation with a  CT of the abdomen with attention pancreas with oral and intravenous  contrast is recommended.   [SS]   1315 Personally discussed currently available facts and concerns about the case with surgery, who advises offered pt admission for surgery tomorrow. Pt undecided about staying   [SS]   1502 CT abdomen pelvis w IV contrast  Radiology read:  IMPRESSION:  1. Intra and extrahepatic biliary dilation and pancreatic ductal  dilation of indeterminate exact etiology as seen on this exam.  MRI/MRCP is recommended for further assessment. Laboratory  correlation is also recommended.  2. Cholelithiasis.   [SS]   1503 Personally discussed currently available facts and concerns about the case with surgery, who advises admit to medicine, keep NPO at midnight   [SS]      ED Course User Index  [SS] Steffen Simerlink, MD         Diagnoses as of 10/06/24 1603   Symptomatic cholelithiasis   Hypomagnesemia     Disposition   As a result of their workup, the patient will require admission to the hospital.  The patient was informed of her diagnosis.  The patient was given the opportunity to ask questions and I answered them. The patient agreed to be admitted to the hospital.    Procedures   Procedures    Steffen Simerlink, MD Steffen Simerlink, MD  10/06/24 1605

## 2024-10-06 NOTE — H&P
History of present illness:  This is a 74 y.o. female with PMH of previous tobacco use, cholelithiasis, HTN, CAD status post stenting, HLD, DMT2, GERD, glaucoma, emphysema, vitamin D deficiency, RA, hepatitis C, chronic anemia, presented with constipation and right upper quadrant pain.  Patient developed RUQ pain radiating into her back last Monday, eventually subsided but recurred yesterday -she went to urgent care and advised to come to ED.   Also reports constipation for 2 weeks, has only had small amounts of stool following laxatives.  Denies nausea/vomiting, + flatus. Denies fevers/chills.   Denies any recent CP/SOB, complaint with all meds including cardiac.   Workup with normal/elevated BP, labs notable for hypomagnesemia otherwise stable, gallbladder US showed cholelithiasis, CT abdomen again with cholelithiasis, intra and extrahepatic biliary dilation and pancreatic ductal dilation.  Patient admitted for observation, planned for cholecystectomy.     Stress test 12/5/23:   1. No electrocardiographic or echocardiographic evidence for ischemia at a maximal workload.   2. The resting ejection fraction was estimated at 60% with a peak exercise ejection fraction estimated at 70%.   3. Patient had mild chest pressure at peak exercise.   4. Adequate level of stress achieved.    Full ROS done and negative except as stated above.      Surgical History:  Total abdominal hysterectomy w/ bilateral salpingoophorectomy    Social History     Socioeconomic History    Marital status:    Tobacco Use    Smoking status: Never    Smokeless tobacco: Never   Substance and Sexual Activity    Alcohol use: Yes     Comment: once every other day: wine    Drug use: Never     Family History   Problem Relation Name Age of Onset    COPD Mother      Colon cancer Mother      Prostate cancer Father      Multiple myeloma Sister      Hodgkin's lymphoma Brother      Breast cancer Daughter        Current Outpatient Medications   Medication  "Sig Dispense Refill    aspirin 81 mg EC tablet Take 1 tablet (81 mg) by mouth once daily.      ezetimibe (Zetia) 10 mg tablet Take 1 tablet (10 mg) by mouth once daily. 90 tablet 3    ferrous gluconate (Fergon) 324 (38 Fe) mg tablet Take 1 tablet by mouth with breakfast 3 times per week (Patient taking differently: Take 1 tablet (38 mg of iron) by mouth once a day on Monday, Wednesday, and Friday. Take 1 tablet by mouth with breakfast 3 times per week) 84 tablet 3    latanoprost (Xalatan) 0.005 % ophthalmic solution Administer 1 drop into both eyes once daily at bedtime. 7.5 mL 1    metFORMIN (Glucophage) 500 mg tablet TAKE 2 TABLETS BY MOUTH IN THE  MORNING AND 1 TABLET IN THE  EVENING (Patient taking differently: Take 1-2 tablets (500-1,000 mg) by mouth 2 times daily (morning and late afternoon). TAKE 2 TABLETS BY MOUTH IN THE  MORNING AND 1 TABLET IN THE  EVENING) 300 tablet 2    omeprazole (PriLOSEC) 20 mg DR capsule Take 1 capsule (20 mg) by mouth once daily in the morning. Take before meals. Do not crush or chew. 30 capsule 3    albuterol 90 mcg/actuation inhaler INHALE 2 INHALATIONS BY MOUTH  EVERY 4 HOURS IF NEEDED FOR  WHEEZING OR SHORTNESS OF BREATH 40.2 g 2    azelastine (Optivar) 0.05 % ophthalmic solution 1 drop both eyes 2 times a day as needed for itching/allergies 6 mL 6    nitroglycerin (Nitrostat) 0.4 mg SL tablet DISSOLVE 1 TABLET UNDER THE  TONGUE EVERY 5 MINUTES AS NEEDED FOR CHEST PAIN. MAX OF 3 TABLETS IN 15 MINUTES. CALL 911 IF PAIN  PERSISTS. 100 tablet 3    OneTouch Ultra Test strip Use to check blood sugar 3 times daily. 300 strip 2      Vitals (Last 24 Hours):  Heart Rate:  [86]   Temperature:  [37.1 °C (98.8 °F)]   Respirations:  [18]   BP: (133-173)/()   Height:  [154.9 cm (5' 0.98\")]   Weight:  [68 kg (150 lb)]   Pulse Ox:  [92 %-98 %]      PHYSICAL EXAM:  Constitutional: NAD, alert and cooperative  Eyes: no icterus  ENMT: mucous membranes moist, no lesions  Head/Neck: " supple  Respiratory/Thorax: CTA bilaterally, non-labored breathing, no cough, on RA  Cardiovascular: RRR, no murmurs heard  Gastrointestinal: + distended abd (baseline per pt), soft and non-tended at present   : no Drake, no SP/flank discomfort  Musculoskeletal: no joint swelling, ROM intact  Extremities: no edema  Neurological: non-focal  Skin: warm and dry  Psych: calm, stable mood     MEDS:  aspirin, 81 mg, oral, Daily  [Held by provider] enoxaparin, 40 mg, subcutaneous, q24h  ezetimibe, 10 mg, oral, Daily  latanoprost, 1 drop, Both Eyes, Nightly  ondansetron, 4 mg, intravenous, Once  [START ON 10/7/2024] pantoprazole, 40 mg, oral, Daily before breakfast    PRN medications: acetaminophen **OR** acetaminophen **OR** acetaminophen, albuterol, alum-mag hydroxide-simeth, dextromethorphan-guaifenesin, melatonin, nitroglycerin, ondansetron ODT **OR** ondansetron, polyethylene glycol      I have reviewed all imaging reports and labs pertinent to this visit    ASSESSMENT/PLAN:  74 y.o. female with PMH of previous tobacco use, cholelithiasis, HTN, CAD status post stenting, HLD, DMT2, GERD, glaucoma, emphysema, vitamin D deficiency, RA, hepatitis C, chronic anemia, presented with constipation and right upper quadrant pain.  Workup with normal/elevated BP, labs notable for hypomagnesemia otherwise stable, gallbladder US showed cholelithiasis, CT abdomen again with cholelithiasis, intra and extrahepatic biliary dilation and pancreatic ductal dilation.  Patient admitted for observation, planned for cholecystectomy.     Symptomatic cholelithiasis, cholecystitis   Intra and extrahepatic biliary dilation, pancreatic ductal dilation  -H/D stable, afebrile, no current s/s cholangitis, LFTs stable   -NPO @ [facility] for cholecystectomy   -may require GI c/s and/or ERCP/MRCP   -analgesics PRN   -will defer abs for now, low threshold to start based on course   -denies concerning cardiac symptoms and is compliant with meds, will c/s  cardiology for risk stratification, tele ordered     HTN  -not on meds at home   -elevated BP, possibly 2/2 pain / stress, monitor for now     Constipation   -bowel regimen in place (agreed to suppository)     HypoMg  -repleted     Other comorbidities as above  -continue medications as ordered and adjust based on clinical course     VTE / GI prophylaxis   -subcutaneous Lovenox (hold pre-op), PPI, bowel regimen in place     Discharge planning  -no PT needs     Discussed with Dr. Soy Borges, APRN-CNP

## 2024-10-06 NOTE — CONSULTS
"Reason For Consult  \"Symptomatic cholelithiasis\"    History Of Present Illness  Taylor Richard is a 74 y.o. female presenting with RUQ pain she has had for around 1 week. This has not gotten better for her which prompted her to seek medical attention in the ED. She denied nausea, vomiting, fevers, chills. Her abdominal pain is localized to the RUQ. She is not on any blood thinning medications. Surgery consulted after evidence of cholelithiasis on work up.     Past Medical History  She has a past medical history of Acute candidiasis of vulva and vagina (01/06/2017), Age-related nuclear cataract, left eye (05/21/2018), Age-related nuclear cataract, right eye (01/10/2018), Cellulitis of left lower limb (05/27/2021), Chronic viral hepatitis C (Multi) (08/14/2017), Combined forms of age-related cataract, bilateral (12/13/2017), Contusion of right eyelid and periocular area, initial encounter (01/10/2018), Cortical age-related cataract, left eye (05/21/2018), Cortical age-related cataract, right eye (01/10/2018), Edema of larynx (12/30/2016), Encounter for immunization (05/26/2021), Hypertensive retinopathy, bilateral (12/19/2022), Meibomian gland dysfunction of unspecified eye, unspecified eyelid (12/19/2022), Myopia, bilateral (12/19/2022), Ocular hypertension, unspecified eye (01/15/2016), Ocular pain, right eye (01/10/2018), Other conditions influencing health status (05/21/2018), Other pulmonary collapse (06/28/2016), Other specified noninflammatory disorders of vulva and perineum (12/15/2014), Pain in right shoulder (04/06/2016), Pain in unspecified ankle and joints of unspecified foot (10/27/2015), Personal history of nicotine dependence (01/04/2023), Personal history of other diseases of the digestive system (08/17/2017), Personal history of other diseases of the respiratory system (05/03/2018), Personal history of other infectious and parasitic diseases (09/15/2021), Personal history of other infectious and " parasitic diseases, Personal history of other specified conditions (08/14/2017), Personal history of other specified conditions (03/27/2018), Prediabetes (12/19/2022), Preglaucoma, unspecified, bilateral (12/19/2022), Presence of intraocular lens (02/15/2018), Presence of intraocular lens (12/19/2022), Right upper quadrant pain (10/14/2021), Right upper quadrant pain (10/14/2021), Strain of unspecified muscle, fascia and tendon at shoulder and upper arm level, right arm, initial encounter (04/06/2016), Systemic lupus erythematosus, unspecified (12/19/2022), Systemic lupus erythematosus, unspecified (11/29/2017), Type 2 diabetes mellitus without complications (Multi) (01/04/2023), Type 2 diabetes mellitus without complications (Multi) (12/13/2017), Type 2 diabetes mellitus without complications (Multi) (10/31/2017), Unspecified symptoms and signs involving the genitourinary system (09/15/2021), and Unspecified visual field defects (04/27/2016).    Surgical History  She has a past surgical history that includes Total abdominal hysterectomy w/ bilateral salpingoophorectomy (11/07/2014); Other surgical history (10/14/2021); Other surgical history (10/14/2021); Other surgical history (10/14/2021); Other surgical history (03/17/2022); and CT angio coronary art with heartflow if score >30% (2/18/2019).     Social History  She reports that she has never smoked. She has never used smokeless tobacco. She reports current alcohol use. She reports that she does not use drugs.    Family History  Family History   Problem Relation Name Age of Onset    COPD Mother      Colon cancer Mother      Prostate cancer Father      Multiple myeloma Sister      Hodgkin's lymphoma Brother      Breast cancer Daughter          Allergies  Acetaminophen, Isosorbide, Pravastatin, Codeine, Losartan, and Penicillins    Review of Systems  A full 10 point ROS was completed. Nothing positive other than what was mentioned in the HPI.     Physical  "Exam  PE:  Constitutional: A&Ox3, calm and cooperative, NAD  Eyes: PERRL, clear sclera   Head/Neck: Neck supple  Cardiovascular: Normal rate and regular rhythm.  Respiratory/Thorax: Good symmetric chest expansion. No labored breathing.  Gastrointestinal: Abdomen slightly distended, soft, RUQ tenderness  Genitourinary: Voiding independently   Extremities: No peripheral edema  Neurological: A&Ox3, No focal deficits  Psychological: Appropriate mood and behavior  Skin: Warm and dry    Last Recorded Vitals  Blood pressure (!) 173/92, pulse 86, temperature 37.1 °C (98.8 °F), resp. rate 18, height 1.549 m (5' 0.98\"), weight 68 kg (150 lb), SpO2 96%.    Relevant Results  Scheduled medications  aspirin, 81 mg, oral, Daily  [Held by provider] enoxaparin, 40 mg, subcutaneous, q24h  ezetimibe, 10 mg, oral, Daily  latanoprost, 1 drop, Both Eyes, Nightly  ondansetron, 4 mg, intravenous, Once  [START ON 10/7/2024] pantoprazole, 40 mg, oral, Daily before breakfast  polyethylene glycol, 17 g, oral, BID      Continuous medications     PRN medications  PRN medications: acetaminophen **OR** acetaminophen **OR** acetaminophen, albuterol, alum-mag hydroxide-simeth, bisacodyl, dextromethorphan-guaifenesin, melatonin, nitroglycerin, ondansetron ODT **OR** ondansetron    Results for orders placed or performed during the hospital encounter of 10/06/24 (from the past 24 hour(s))   CBC and Auto Differential   Result Value Ref Range    WBC 9.6 4.4 - 11.3 x10*3/uL    nRBC 0.0 0.0 - 0.0 /100 WBCs    RBC 4.77 4.00 - 5.20 x10*6/uL    Hemoglobin 11.8 (L) 12.0 - 16.0 g/dL    Hematocrit 36.6 36.0 - 46.0 %    MCV 77 (L) 80 - 100 fL    MCH 24.7 (L) 26.0 - 34.0 pg    MCHC 32.2 32.0 - 36.0 g/dL    RDW 14.8 (H) 11.5 - 14.5 %    Platelets 259 150 - 450 x10*3/uL    Neutrophils % 41.7 40.0 - 80.0 %    Immature Granulocytes %, Automated 0.3 0.0 - 0.9 %    Lymphocytes % 49.6 13.0 - 44.0 %    Monocytes % 5.4 2.0 - 10.0 %    Eosinophils % 2.2 0.0 - 6.0 %    " Basophils % 0.8 0.0 - 2.0 %    Neutrophils Absolute 4.00 1.60 - 5.50 x10*3/uL    Immature Granulocytes Absolute, Automated 0.03 0.00 - 0.50 x10*3/uL    Lymphocytes Absolute 4.77 (H) 0.80 - 3.00 x10*3/uL    Monocytes Absolute 0.52 0.05 - 0.80 x10*3/uL    Eosinophils Absolute 0.21 0.00 - 0.40 x10*3/uL    Basophils Absolute 0.08 0.00 - 0.10 x10*3/uL   Comprehensive metabolic panel   Result Value Ref Range    Glucose 136 (H) 74 - 99 mg/dL    Sodium 137 136 - 145 mmol/L    Potassium 6.2 (HH) 3.5 - 5.3 mmol/L    Chloride 104 98 - 107 mmol/L    Bicarbonate 23 21 - 32 mmol/L    Anion Gap 16 10 - 20 mmol/L    Urea Nitrogen 15 6 - 23 mg/dL    Creatinine 0.82 0.50 - 1.05 mg/dL    eGFR 75 >60 mL/min/1.73m*2    Calcium 9.5 8.6 - 10.3 mg/dL    Albumin 4.4 3.4 - 5.0 g/dL    Alkaline Phosphatase 75 33 - 136 U/L    Total Protein 8.1 6.4 - 8.2 g/dL    AST 32 9 - 39 U/L    Bilirubin, Total 0.2 0.0 - 1.2 mg/dL    ALT 9 7 - 45 U/L   Magnesium   Result Value Ref Range    Magnesium 1.50 (L) 1.60 - 2.40 mg/dL   Lipase   Result Value Ref Range    Lipase 72 9 - 82 U/L   Troponin I, High Sensitivity, Initial   Result Value Ref Range    Troponin I, High Sensitivity 6 0 - 13 ng/L   Troponin, High Sensitivity, 1 Hour   Result Value Ref Range    Troponin I, High Sensitivity 8 0 - 13 ng/L   Hepatic function panel   Result Value Ref Range    Albumin 3.9 3.4 - 5.0 g/dL    Bilirubin, Total 0.2 0.0 - 1.2 mg/dL    Bilirubin, Direct 0.1 0.0 - 0.3 mg/dL    Alkaline Phosphatase 67 33 - 136 U/L    ALT 7 7 - 45 U/L    AST 15 9 - 39 U/L    Total Protein 6.8 6.4 - 8.2 g/dL   Potassium   Result Value Ref Range    Potassium 4.1 3.5 - 5.3 mmol/L   Magnesium   Result Value Ref Range    Magnesium 1.30 (L) 1.60 - 2.40 mg/dL   Protime-INR   Result Value Ref Range    Protime 11.3 9.8 - 12.8 seconds    INR 1.0 0.9 - 1.1   Type and Screen   Result Value Ref Range    ABO TYPE O     Rh TYPE POS     ANTIBODY SCREEN NEG          Assessment/Plan     Plan:  - Medicine  admit  - NPO MN  - Tentative plan for cholecystectomy tomorrow    Will review with surgeons in AM tomorrow.    Discussed with Dr Ulrich.     I spent 60 minutes in the professional and overall care of this patient.    Navarro Christensen PA-C

## 2024-10-06 NOTE — ED TRIAGE NOTES
Patient to ED for c/o gallstone pain x 4 days. Denies N/V. Patient reports not having a BM x 2 weeks.

## 2024-10-06 NOTE — PROGRESS NOTES
"Pharmacy Medication History Review    Per patient.     Taylor Richard is a 74 y.o. female admitted for No Principal Problem: There is no principal problem currently on the Problem List. Please update the Problem List and refresh.. Pharmacy reviewed the patient's [id number] medications and allergies for accuracy.    The list below reflectives the updated PTA list. Please review each medication in order reconciliation for additional clarification and justification.       The list below reflectives the updated allergy list. Please review each documented allergy for additional clarification and justification.  Allergies  Reviewed by Teresa Steiner on 10/5/2024        Severity Reactions Comments    Acetaminophen Not Specified Other \"Didn't sit right with me\"    Isosorbide Not Specified Hives     Pravastatin Not Specified Nausea Only, Unknown     Codeine Low Unknown, Rash     Losartan Low Rash     Penicillins Low Unknown, Rash             Below are additional concerns with the patient's PTA list.  Prior to Admission Medications   Prescriptions Last Dose Informant   OneTouch Ultra Test strip     Sig: Use to check blood sugar 3 times daily.   albuterol 90 mcg/actuation inhaler     Sig: INHALE 2 INHALATIONS BY MOUTH  EVERY 4 HOURS IF NEEDED FOR  WHEEZING OR SHORTNESS OF BREATH   aspirin 81 mg EC tablet 10/6/2024    Sig: Take 1 tablet (81 mg) by mouth once daily.           ezetimibe (Zetia) 10 mg tablet 10/6/2024    Sig: Take 1 tablet (10 mg) by mouth once daily.   ferrous gluconate (Fergon) 324 (38 Fe) mg tablet 10/5/2024    Sig: Take 1 tablet by mouth with breakfast 3 times per week   Patient taking differently: Take 1 tablet (38 mg of iron) by mouth once a day on Monday, Wednesday, and Friday. Take 1 tablet by mouth with breakfast 3 times per week   latanoprost (Xalatan) 0.005 % ophthalmic solution 10/5/2024    Sig: Administer 1 drop into both eyes once daily at bedtime.   metFORMIN (Glucophage) 500 mg " tablet 10/6/2024    Sig: TAKE 2 TABLETS BY MOUTH IN THE  MORNING AND 1 TABLET IN THE  EVENING   Patient taking differently: Take 1-2 tablets (500-1,000 mg) by mouth 2 times daily (morning and late afternoon). TAKE 2 TABLETS BY MOUTH IN THE  MORNING AND 1 TABLET IN THE  EVENING   nitroglycerin (Nitrostat) 0.4 mg SL tablet     Sig: DISSOLVE 1 TABLET UNDER THE  TONGUE EVERY 5 MINUTES AS NEEDED FOR CHEST PAIN. MAX OF 3 TABLETS IN 15 MINUTES. CALL 911 IF PAIN  PERSISTS.   omeprazole (PriLOSEC) 20 mg DR capsule 10/6/2024    Sig: Take 1 capsule (20 mg) by mouth once daily in the morning. Take before meals. Do not crush or chew.      Facility-Administered Medications: None        Suri Pack

## 2024-10-07 ENCOUNTER — ANESTHESIA (OUTPATIENT)
Dept: OPERATING ROOM | Facility: HOSPITAL | Age: 74
End: 2024-10-07
Payer: MEDICARE

## 2024-10-07 ENCOUNTER — APPOINTMENT (OUTPATIENT)
Dept: RADIOLOGY | Facility: HOSPITAL | Age: 74
End: 2024-10-07
Payer: MEDICARE

## 2024-10-07 ENCOUNTER — ANESTHESIA EVENT (OUTPATIENT)
Dept: OPERATING ROOM | Facility: HOSPITAL | Age: 74
End: 2024-10-07
Payer: MEDICARE

## 2024-10-07 LAB
ALBUMIN SERPL BCP-MCNC: 4.1 G/DL (ref 3.4–5)
ALP SERPL-CCNC: 70 U/L (ref 33–136)
ALT SERPL W P-5'-P-CCNC: 5 U/L (ref 7–45)
ANION GAP SERPL CALC-SCNC: 14 MMOL/L (ref 10–20)
AST SERPL W P-5'-P-CCNC: 12 U/L (ref 9–39)
ATRIAL RATE: 67 BPM
BILIRUB SERPL-MCNC: 0.3 MG/DL (ref 0–1.2)
BUN SERPL-MCNC: 12 MG/DL (ref 6–23)
CALCIUM SERPL-MCNC: 9 MG/DL (ref 8.6–10.3)
CHLORIDE SERPL-SCNC: 105 MMOL/L (ref 98–107)
CO2 SERPL-SCNC: 24 MMOL/L (ref 21–32)
CREAT SERPL-MCNC: 0.77 MG/DL (ref 0.5–1.05)
EGFRCR SERPLBLD CKD-EPI 2021: 81 ML/MIN/1.73M*2
ERYTHROCYTE [DISTWIDTH] IN BLOOD BY AUTOMATED COUNT: 14.6 % (ref 11.5–14.5)
GLUCOSE BLD MANUAL STRIP-MCNC: 130 MG/DL (ref 74–99)
GLUCOSE SERPL-MCNC: 122 MG/DL (ref 74–99)
HCT VFR BLD AUTO: 33 % (ref 36–46)
HGB BLD-MCNC: 11 G/DL (ref 12–16)
MAGNESIUM SERPL-MCNC: 1.8 MG/DL (ref 1.6–2.4)
MCH RBC QN AUTO: 25.7 PG (ref 26–34)
MCHC RBC AUTO-ENTMCNC: 33.3 G/DL (ref 32–36)
MCV RBC AUTO: 77 FL (ref 80–100)
NRBC BLD-RTO: 0 /100 WBCS (ref 0–0)
P AXIS: 62 DEGREES
P OFFSET: 196 MS
P ONSET: 151 MS
PLATELET # BLD AUTO: 263 X10*3/UL (ref 150–450)
POTASSIUM SERPL-SCNC: 4.2 MMOL/L (ref 3.5–5.3)
PR INTERVAL: 158 MS
PROT SERPL-MCNC: 6.9 G/DL (ref 6.4–8.2)
Q ONSET: 230 MS
QRS COUNT: 11 BEATS
QRS DURATION: 80 MS
QT INTERVAL: 408 MS
QTC CALCULATION(BAZETT): 431 MS
QTC FREDERICIA: 423 MS
R AXIS: -6 DEGREES
RBC # BLD AUTO: 4.28 X10*6/UL (ref 4–5.2)
SODIUM SERPL-SCNC: 139 MMOL/L (ref 136–145)
T AXIS: 5 DEGREES
T OFFSET: 434 MS
VENTRICULAR RATE: 67 BPM
WBC # BLD AUTO: 9.8 X10*3/UL (ref 4.4–11.3)

## 2024-10-07 PROCEDURE — 2500000001 HC RX 250 WO HCPCS SELF ADMINISTERED DRUGS (ALT 637 FOR MEDICARE OP): Performed by: ANESTHESIOLOGIST ASSISTANT

## 2024-10-07 PROCEDURE — 96376 TX/PRO/DX INJ SAME DRUG ADON: CPT | Mod: 59

## 2024-10-07 PROCEDURE — 2500000004 HC RX 250 GENERAL PHARMACY W/ HCPCS (ALT 636 FOR OP/ED): Performed by: ANESTHESIOLOGIST ASSISTANT

## 2024-10-07 PROCEDURE — 2500000005 HC RX 250 GENERAL PHARMACY W/O HCPCS: Performed by: STUDENT IN AN ORGANIZED HEALTH CARE EDUCATION/TRAINING PROGRAM

## 2024-10-07 PROCEDURE — A47563 PR LAP,CHOLECYSTECTOMY/GRAPH: Performed by: STUDENT IN AN ORGANIZED HEALTH CARE EDUCATION/TRAINING PROGRAM

## 2024-10-07 PROCEDURE — 47563 LAPARO CHOLECYSTECTOMY/GRAPH: CPT | Performed by: SURGERY

## 2024-10-07 PROCEDURE — 88304 TISSUE EXAM BY PATHOLOGIST: CPT | Mod: TC,AHULAB

## 2024-10-07 PROCEDURE — 74300 X-RAY BILE DUCTS/PANCREAS: CPT

## 2024-10-07 PROCEDURE — G0378 HOSPITAL OBSERVATION PER HR: HCPCS

## 2024-10-07 PROCEDURE — 2500000005 HC RX 250 GENERAL PHARMACY W/O HCPCS: Performed by: ANESTHESIOLOGIST ASSISTANT

## 2024-10-07 PROCEDURE — 99223 1ST HOSP IP/OBS HIGH 75: CPT | Performed by: INTERNAL MEDICINE

## 2024-10-07 PROCEDURE — 2550000001 HC RX 255 CONTRASTS: Performed by: SURGERY

## 2024-10-07 PROCEDURE — 3600000008 HC OR TIME - EACH INCREMENTAL 1 MINUTE - PROCEDURE LEVEL THREE: Performed by: SURGERY

## 2024-10-07 PROCEDURE — 99222 1ST HOSP IP/OBS MODERATE 55: CPT | Performed by: SURGERY

## 2024-10-07 PROCEDURE — 99100 ANES PT EXTEME AGE<1 YR&>70: CPT | Performed by: STUDENT IN AN ORGANIZED HEALTH CARE EDUCATION/TRAINING PROGRAM

## 2024-10-07 PROCEDURE — A47563 PR LAP,CHOLECYSTECTOMY/GRAPH: Performed by: ANESTHESIOLOGIST ASSISTANT

## 2024-10-07 PROCEDURE — 2500000004 HC RX 250 GENERAL PHARMACY W/ HCPCS (ALT 636 FOR OP/ED): Performed by: STUDENT IN AN ORGANIZED HEALTH CARE EDUCATION/TRAINING PROGRAM

## 2024-10-07 PROCEDURE — 2500000004 HC RX 250 GENERAL PHARMACY W/ HCPCS (ALT 636 FOR OP/ED): Performed by: SURGERY

## 2024-10-07 PROCEDURE — 3600000003 HC OR TIME - INITIAL BASE CHARGE - PROCEDURE LEVEL THREE: Performed by: SURGERY

## 2024-10-07 PROCEDURE — 96375 TX/PRO/DX INJ NEW DRUG ADDON: CPT | Mod: 59

## 2024-10-07 PROCEDURE — 2720000007 HC OR 272 NO HCPCS: Performed by: SURGERY

## 2024-10-07 PROCEDURE — 83735 ASSAY OF MAGNESIUM: CPT | Performed by: NURSE PRACTITIONER

## 2024-10-07 PROCEDURE — 85027 COMPLETE CBC AUTOMATED: CPT | Performed by: NURSE PRACTITIONER

## 2024-10-07 PROCEDURE — 2780000003 HC OR 278 NO HCPCS: Performed by: SURGERY

## 2024-10-07 PROCEDURE — 80053 COMPREHEN METABOLIC PANEL: CPT | Performed by: NURSE PRACTITIONER

## 2024-10-07 PROCEDURE — 36415 COLL VENOUS BLD VENIPUNCTURE: CPT | Performed by: NURSE PRACTITIONER

## 2024-10-07 PROCEDURE — 2500000001 HC RX 250 WO HCPCS SELF ADMINISTERED DRUGS (ALT 637 FOR MEDICARE OP): Performed by: SURGERY

## 2024-10-07 PROCEDURE — 3700000002 HC GENERAL ANESTHESIA TIME - EACH INCREMENTAL 1 MINUTE: Performed by: SURGERY

## 2024-10-07 PROCEDURE — 3700000001 HC GENERAL ANESTHESIA TIME - INITIAL BASE CHARGE: Performed by: SURGERY

## 2024-10-07 PROCEDURE — 82947 ASSAY GLUCOSE BLOOD QUANT: CPT

## 2024-10-07 PROCEDURE — 7100000002 HC RECOVERY ROOM TIME - EACH INCREMENTAL 1 MINUTE: Performed by: SURGERY

## 2024-10-07 PROCEDURE — 7100000001 HC RECOVERY ROOM TIME - INITIAL BASE CHARGE: Performed by: SURGERY

## 2024-10-07 RX ORDER — SODIUM CHLORIDE, SODIUM LACTATE, POTASSIUM CHLORIDE, CALCIUM CHLORIDE 600; 310; 30; 20 MG/100ML; MG/100ML; MG/100ML; MG/100ML
100 INJECTION, SOLUTION INTRAVENOUS CONTINUOUS
Status: DISCONTINUED | OUTPATIENT
Start: 2024-10-07 | End: 2024-10-07 | Stop reason: HOSPADM

## 2024-10-07 RX ORDER — SODIUM CHLORIDE, SODIUM LACTATE, POTASSIUM CHLORIDE, CALCIUM CHLORIDE 600; 310; 30; 20 MG/100ML; MG/100ML; MG/100ML; MG/100ML
INJECTION, SOLUTION INTRAVENOUS CONTINUOUS PRN
Status: DISCONTINUED | OUTPATIENT
Start: 2024-10-07 | End: 2024-10-07

## 2024-10-07 RX ORDER — ALBUTEROL SULFATE 90 UG/1
INHALANT RESPIRATORY (INHALATION) AS NEEDED
Status: DISCONTINUED | OUTPATIENT
Start: 2024-10-07 | End: 2024-10-07

## 2024-10-07 RX ORDER — OXYCODONE HYDROCHLORIDE 5 MG/1
10 TABLET ORAL EVERY 4 HOURS PRN
Status: DISCONTINUED | OUTPATIENT
Start: 2024-10-07 | End: 2024-10-07 | Stop reason: HOSPADM

## 2024-10-07 RX ORDER — LIDOCAINE HYDROCHLORIDE 10 MG/ML
0.1 INJECTION, SOLUTION EPIDURAL; INFILTRATION; INTRACAUDAL; PERINEURAL ONCE
Status: DISCONTINUED | OUTPATIENT
Start: 2024-10-07 | End: 2024-10-07 | Stop reason: HOSPADM

## 2024-10-07 RX ORDER — PROPOFOL 10 MG/ML
INJECTION, EMULSION INTRAVENOUS AS NEEDED
Status: DISCONTINUED | OUTPATIENT
Start: 2024-10-07 | End: 2024-10-07

## 2024-10-07 RX ORDER — LABETALOL HYDROCHLORIDE 5 MG/ML
INJECTION, SOLUTION INTRAVENOUS AS NEEDED
Status: DISCONTINUED | OUTPATIENT
Start: 2024-10-07 | End: 2024-10-07

## 2024-10-07 RX ORDER — OXYCODONE HYDROCHLORIDE 5 MG/1
5 TABLET ORAL EVERY 4 HOURS PRN
Status: DISCONTINUED | OUTPATIENT
Start: 2024-10-07 | End: 2024-10-07 | Stop reason: HOSPADM

## 2024-10-07 RX ORDER — MIDAZOLAM HYDROCHLORIDE 1 MG/ML
INJECTION INTRAMUSCULAR; INTRAVENOUS AS NEEDED
Status: DISCONTINUED | OUTPATIENT
Start: 2024-10-07 | End: 2024-10-07

## 2024-10-07 RX ORDER — LIDOCAINE HYDROCHLORIDE 20 MG/ML
INJECTION, SOLUTION EPIDURAL; INFILTRATION; INTRACAUDAL; PERINEURAL AS NEEDED
Status: DISCONTINUED | OUTPATIENT
Start: 2024-10-07 | End: 2024-10-07

## 2024-10-07 RX ORDER — KETOROLAC TROMETHAMINE 30 MG/ML
15 INJECTION, SOLUTION INTRAMUSCULAR; INTRAVENOUS EVERY 6 HOURS
Status: DISCONTINUED | OUTPATIENT
Start: 2024-10-07 | End: 2024-10-08 | Stop reason: HOSPADM

## 2024-10-07 RX ORDER — OXYCODONE HYDROCHLORIDE 5 MG/1
5 TABLET ORAL EVERY 4 HOURS PRN
Status: DISCONTINUED | OUTPATIENT
Start: 2024-10-07 | End: 2024-10-08 | Stop reason: HOSPADM

## 2024-10-07 RX ORDER — FENTANYL CITRATE 50 UG/ML
INJECTION, SOLUTION INTRAMUSCULAR; INTRAVENOUS AS NEEDED
Status: DISCONTINUED | OUTPATIENT
Start: 2024-10-07 | End: 2024-10-07

## 2024-10-07 RX ORDER — CEFAZOLIN 1 G/1
INJECTION, POWDER, FOR SOLUTION INTRAVENOUS AS NEEDED
Status: DISCONTINUED | OUTPATIENT
Start: 2024-10-07 | End: 2024-10-07

## 2024-10-07 RX ORDER — ROCURONIUM BROMIDE 10 MG/ML
INJECTION, SOLUTION INTRAVENOUS AS NEEDED
Status: DISCONTINUED | OUTPATIENT
Start: 2024-10-07 | End: 2024-10-07

## 2024-10-07 RX ORDER — ENOXAPARIN SODIUM 100 MG/ML
40 INJECTION SUBCUTANEOUS EVERY 24 HOURS
Status: DISCONTINUED | OUTPATIENT
Start: 2024-10-07 | End: 2024-10-08 | Stop reason: HOSPADM

## 2024-10-07 RX ORDER — BUPIVACAINE HYDROCHLORIDE 5 MG/ML
INJECTION, SOLUTION PERINEURAL AS NEEDED
Status: DISCONTINUED | OUTPATIENT
Start: 2024-10-07 | End: 2024-10-07 | Stop reason: HOSPADM

## 2024-10-07 RX ORDER — HYDRALAZINE HYDROCHLORIDE 20 MG/ML
INJECTION INTRAMUSCULAR; INTRAVENOUS AS NEEDED
Status: DISCONTINUED | OUTPATIENT
Start: 2024-10-07 | End: 2024-10-07

## 2024-10-07 RX ORDER — ACETAMINOPHEN 325 MG/1
650 TABLET ORAL EVERY 4 HOURS PRN
Status: DISCONTINUED | OUTPATIENT
Start: 2024-10-07 | End: 2024-10-07 | Stop reason: HOSPADM

## 2024-10-07 RX ORDER — ONDANSETRON HYDROCHLORIDE 2 MG/ML
INJECTION, SOLUTION INTRAVENOUS AS NEEDED
Status: DISCONTINUED | OUTPATIENT
Start: 2024-10-07 | End: 2024-10-07

## 2024-10-07 RX ADMIN — POLYETHYLENE GLYCOL 3350 17 G: 17 POWDER, FOR SOLUTION ORAL at 21:54

## 2024-10-07 RX ADMIN — HYDROMORPHONE HYDROCHLORIDE 0.5 MG: 1 INJECTION, SOLUTION INTRAMUSCULAR; INTRAVENOUS; SUBCUTANEOUS at 13:51

## 2024-10-07 RX ADMIN — KETOROLAC TROMETHAMINE 15 MG: 30 INJECTION INTRAMUSCULAR; INTRAVENOUS at 21:35

## 2024-10-07 RX ADMIN — Medication 8 L/MIN: at 12:40

## 2024-10-07 RX ADMIN — LATANOPROST 1 DROP: 50 SOLUTION/ DROPS OPHTHALMIC at 23:29

## 2024-10-07 RX ADMIN — Medication 3 L/MIN: at 13:05

## 2024-10-07 RX ADMIN — HYDROMORPHONE HYDROCHLORIDE 0.5 MG: 1 INJECTION, SOLUTION INTRAMUSCULAR; INTRAVENOUS; SUBCUTANEOUS at 23:36

## 2024-10-07 RX ADMIN — ONDANSETRON 4 MG: 2 INJECTION INTRAMUSCULAR; INTRAVENOUS at 15:13

## 2024-10-07 SDOH — HEALTH STABILITY: MENTAL HEALTH: CURRENT SMOKER: 0

## 2024-10-07 ASSESSMENT — COGNITIVE AND FUNCTIONAL STATUS - GENERAL
DAILY ACTIVITIY SCORE: 24
CLIMB 3 TO 5 STEPS WITH RAILING: A LITTLE
MOBILITY SCORE: 23
DAILY ACTIVITIY SCORE: 24
MOBILITY SCORE: 23
CLIMB 3 TO 5 STEPS WITH RAILING: A LITTLE

## 2024-10-07 ASSESSMENT — PAIN SCALES - GENERAL
PAINLEVEL_OUTOF10: 0 - NO PAIN
PAINLEVEL_OUTOF10: 4
PAINLEVEL_OUTOF10: 10 - WORST POSSIBLE PAIN
PAINLEVEL_OUTOF10: 0 - NO PAIN
PAINLEVEL_OUTOF10: 8
PAINLEVEL_OUTOF10: 3
PAINLEVEL_OUTOF10: 7
PAINLEVEL_OUTOF10: 0 - NO PAIN
PAINLEVEL_OUTOF10: 0 - NO PAIN

## 2024-10-07 ASSESSMENT — PAIN - FUNCTIONAL ASSESSMENT
PAIN_FUNCTIONAL_ASSESSMENT: 0-10
PAIN_FUNCTIONAL_ASSESSMENT: UNABLE TO SELF-REPORT
PAIN_FUNCTIONAL_ASSESSMENT: 0-10
PAIN_FUNCTIONAL_ASSESSMENT: 0-10
PAIN_FUNCTIONAL_ASSESSMENT: UNABLE TO SELF-REPORT
PAIN_FUNCTIONAL_ASSESSMENT: 0-10
PAIN_FUNCTIONAL_ASSESSMENT: UNABLE TO SELF-REPORT
PAIN_FUNCTIONAL_ASSESSMENT: 0-10
PAIN_FUNCTIONAL_ASSESSMENT: UNABLE TO SELF-REPORT
PAIN_FUNCTIONAL_ASSESSMENT: 0-10

## 2024-10-07 ASSESSMENT — PAIN DESCRIPTION - LOCATION: LOCATION: ABDOMEN

## 2024-10-07 ASSESSMENT — PAIN SCALES - PAIN ASSESSMENT IN ADVANCED DEMENTIA (PAINAD): TOTALSCORE: MEDICATION (SEE MAR)

## 2024-10-07 ASSESSMENT — ACTIVITIES OF DAILY LIVING (ADL): LACK_OF_TRANSPORTATION: NO

## 2024-10-07 NOTE — HOSPITAL COURSE
This is a 74 y.o. female with PMH of previous tobacco use, cholelithiasis, HTN, CAD status post stenting, HLD, DMT2, GERD, glaucoma, emphysema, vitamin D deficiency, RA, hepatitis C, chronic anemia, presented with constipation and right upper quadrant pain.  Patient developed RUQ pain radiating into her back last Monday, eventually subsided but recurred yesterday -she went to urgent care and advised to come to ED.   Also reports constipation for 2 weeks, has only had small amounts of stool following laxatives.  Denies nausea/vomiting, + flatus. Denies fevers/chills.   Denies any recent CP/SOB, complaint with all meds including cardiac.   Workup with normal/elevated BP, labs notable for hypomagnesemia otherwise stable, gallbladder US showed cholelithiasis, CT abdomen again with cholelithiasis, intra and extrahepatic biliary dilation and pancreatic ductal dilation.  Patient admitted for observation, planned for cholecystectomy.     Observation Course:  Patient underwent cholecystectomy 10/7/24. ###    Magnesium level 1.3 on admission, replaced.     Cardiology consulted for preoperative evaluation. ###    Discharge weight: ### kg    After all labs and VS were reviewed the decision was made that the patient was medically stable for discharge.  The patient was discharged in satisfactory condition.    More than 30 minutes were spent in coordinating patient discharge.

## 2024-10-07 NOTE — ANESTHESIA PROCEDURE NOTES
Peripheral IV  Date/Time: 10/7/2024 11:07 AM  Inserted by: Doni Reyes DO    Placement  Needle size: 20 G  Laterality: right  Location: antecubital  Local anesthetic: none  Site prep: alcohol  Technique: ultrasound guided  Attempts: 3  Difficult Venous Access: Yes

## 2024-10-07 NOTE — ANESTHESIA PROCEDURE NOTES
Airway  Date/Time: 10/7/2024 11:11 AM  Urgency: elective      Staffing  Performed: NIHARIKA   Authorized by: Doni Reyes DO    Performed by: NIHARIKA De Leon  Patient location during procedure: OR    Indications and Patient Condition  Indications for airway management: anesthesia and airway protection  Spontaneous Ventilation: absent  Sedation level: deep  Preoxygenated: yes  Patient position: sniffing  Mask difficulty assessment: 1 - vent by mask  Planned trial extubation    Final Airway Details  Final airway type: endotracheal airway      Successful airway: ETT  Cuffed: yes   Successful intubation technique: direct laryngoscopy  Blade: Sherman  Blade size: #3  ETT size (mm): 7.0  Cormack-Lehane Classification: grade I - full view of glottis  Placement verified by: chest auscultation and capnometry   Measured from: teeth  ETT to teeth (cm): 21  Number of attempts at approach: 1

## 2024-10-07 NOTE — CONSULTS
Reason For Consult  Acute cholecystitis    Assessment/Plan   Acute cholecystitis.  We reviewed underlying pathophysiology.  We presented laparoscopic cholecystectomy as definitive treatment.  Risks and benefits detailed.  Cardiology recommendations noted.  We can add her onto the OR schedule for later this morning. Patient agreeable    History Of Present Illness  Taylro Richard is a 74 y.o. female presenting with 4 days of upper abdominal pain radiating to her back.  She has had some nausea but no emesis.  Workup in the ER is revealed gallstones.  She has a history of hypertension and CAD.  Cardiology has reviewed her recent echocardiogram and stress echo.  She denies any chest pain or shortness of breath.     Past Medical History  She has a past medical history of Acute candidiasis of vulva and vagina (01/06/2017), Age-related nuclear cataract, left eye (05/21/2018), Age-related nuclear cataract, right eye (01/10/2018), Cellulitis of left lower limb (05/27/2021), Chronic viral hepatitis C (Multi) (08/14/2017), Combined forms of age-related cataract, bilateral (12/13/2017), Contusion of right eyelid and periocular area, initial encounter (01/10/2018), Cortical age-related cataract, left eye (05/21/2018), Cortical age-related cataract, right eye (01/10/2018), Edema of larynx (12/30/2016), Encounter for immunization (05/26/2021), Hypertensive retinopathy, bilateral (12/19/2022), Meibomian gland dysfunction of unspecified eye, unspecified eyelid (12/19/2022), Myopia, bilateral (12/19/2022), Ocular hypertension, unspecified eye (01/15/2016), Ocular pain, right eye (01/10/2018), Other conditions influencing health status (05/21/2018), Other pulmonary collapse (06/28/2016), Other specified noninflammatory disorders of vulva and perineum (12/15/2014), Pain in right shoulder (04/06/2016), Pain in unspecified ankle and joints of unspecified foot (10/27/2015), Personal history of nicotine dependence (01/04/2023),  Personal history of other diseases of the digestive system (08/17/2017), Personal history of other diseases of the respiratory system (05/03/2018), Personal history of other infectious and parasitic diseases (09/15/2021), Personal history of other infectious and parasitic diseases, Personal history of other specified conditions (08/14/2017), Personal history of other specified conditions (03/27/2018), Prediabetes (12/19/2022), Preglaucoma, unspecified, bilateral (12/19/2022), Presence of intraocular lens (02/15/2018), Presence of intraocular lens (12/19/2022), Right upper quadrant pain (10/14/2021), Right upper quadrant pain (10/14/2021), Strain of unspecified muscle, fascia and tendon at shoulder and upper arm level, right arm, initial encounter (04/06/2016), Systemic lupus erythematosus, unspecified (12/19/2022), Systemic lupus erythematosus, unspecified (11/29/2017), Type 2 diabetes mellitus without complications (Multi) (01/04/2023), Type 2 diabetes mellitus without complications (Multi) (12/13/2017), Type 2 diabetes mellitus without complications (Multi) (10/31/2017), Unspecified symptoms and signs involving the genitourinary system (09/15/2021), and Unspecified visual field defects (04/27/2016).    Surgical History  She has a past surgical history that includes Total abdominal hysterectomy w/ bilateral salpingoophorectomy (11/07/2014); Other surgical history (10/14/2021); Other surgical history (10/14/2021); Other surgical history (10/14/2021); Other surgical history (03/17/2022); and CT angio coronary art with heartflow if score >30% (2/18/2019).     Social History  She reports that she has never smoked. She has never used smokeless tobacco. She reports current alcohol use. She reports that she does not use drugs.    Family History  Family History   Problem Relation Name Age of Onset    COPD Mother      Colon cancer Mother      Prostate cancer Father      Multiple myeloma Sister      Hodgkin's lymphoma  "Brother      Breast cancer Daughter          Allergies  Acetaminophen, Isosorbide, Pravastatin, Codeine, Losartan, and Penicillins    Review of Systems  Constitutional: no weight loss, no fevers, no malaise  HEENT: negative  Neck: negative  Pulmonary: no SOB, no cough  CV: no chest pain, otherwise negative  GI: See HPI  : no hematuria, retention.  MS: no aches/pains  Neurologic: negative  Skin: no rashes, lesions  HEME: no bleeding tendency, no bruising  Psych: no mood issues    Physical Exam  Constitutional: Mild distress.  Alert and oriented x 3.    Skin: non jaundiced  HEENT: normal  Lungs: clear to auscultation  CV: RRR, S1S2, no murmurs  Abdomen: soft, mild discomfort RUQ.  No obvious hernias.  No diffuse peritoneal signs  MS: grossly normal  Neuro: grossly normal  Last Recorded Vitals  Blood pressure 152/79, pulse 64, temperature 36 °C (96.8 °F), temperature source Temporal, resp. rate 18, height 1.549 m (5' 0.98\"), weight 68 kg (150 lb), SpO2 96%.    Relevant Results  Lab Results   Component Value Date    WBC 9.8 10/07/2024    HGB 11.0 (L) 10/07/2024    HCT 33.0 (L) 10/07/2024    MCV 77 (L) 10/07/2024     10/07/2024         Sodium   Date Value Ref Range Status   10/07/2024 139 136 - 145 mmol/L Final     Chloride   Date Value Ref Range Status   10/07/2024 105 98 - 107 mmol/L Final     Urea Nitrogen   Date Value Ref Range Status   10/07/2024 12 6 - 23 mg/dL Final         Lab Results   Component Value Date    K 4.2 10/07/2024    CALCIUM 9.0 10/07/2024      No results found for this or any previous visit from the past 3 days.          I spent 35 minutes in the professional and overall care of this patient.        "

## 2024-10-07 NOTE — PROGRESS NOTES
Taylor Richard is a 74 y.o. female on day 0 of admission presenting with Symptomatic cholelithiasis.      Subjective   S/p Laparoscopic cholecystectomy intraoperative cholangiogram today   Feeling nauseated post surgery  Very drowsy s/p anesthesia   In no acute distress     Objective     Last Recorded Vitals  /65   Pulse 72   Temp 36.6 °C (97.9 °F)   Resp 13   Wt 68 kg (150 lb)   SpO2 95%   Intake/Output last 3 Shifts:  No intake or output data in the 24 hours ending 10/07/24 1426    Admission Weight  Weight: 68 kg (150 lb) (10/06/24 1534)    Daily Weight  10/06/24 : 68 kg (150 lb)    Image Results  FL GI cholangiography intraop  These images are not reportable by radiology and will not be interpreted   by  Radiologists.  Electrocardiogram, 12-lead PRN ACS symptoms  Normal sinus rhythm  Nonspecific ST abnormality  Abnormal ECG  When compared with ECG of 12-DEC-2023 11:47,  No significant change was found  See ED provider note for full interpretation and clinical correlation  Confirmed by Louise Benjamin ([id number]) on 10/7/2024 10:13:40 AM      Physical Exam    Constitutional: drowsy  Eyes: no icterus  ENMT: mucous membranes moist, no lesions  Head/Neck: supple  Respiratory/Thorax: CTA bilaterally, non-labored breathing, no cough, on 2L post operatively   Cardiovascular: RRR, no murmurs heard  Gastrointestinal: + distended abd (baseline per pt), soft and tender to palpations, dermabond present  : no Drake, no SP/flank discomfort  Musculoskeletal: no joint swelling, ROM intact  Extremities: no edema  Neurological: non-focal  Skin: warm and dry  Psych: calm, stable mood     Relevant Results  Scheduled medications  [Transfer Hold] aspirin, 81 mg, oral, Daily  [Held by provider] enoxaparin, 40 mg, subcutaneous, q24h  [Transfer Hold] ezetimibe, 10 mg, oral, Daily  [Transfer Hold] latanoprost, 1 drop, Both Eyes, Nightly  lidocaine, 0.1 mL, subcutaneous, Once  [Transfer Hold] ondansetron, 4 mg, intravenous,  Once  [Transfer Hold] pantoprazole, 40 mg, oral, Daily before breakfast  [Transfer Hold] polyethylene glycol, 17 g, oral, BID      Continuous medications  lactated Ringer's, 100 mL/hr      PRN medications  PRN medications: [Transfer Hold] acetaminophen **OR** [Transfer Hold] acetaminophen **OR** [Transfer Hold] acetaminophen, acetaminophen, [Transfer Hold] albuterol, [Transfer Hold] alum-mag hydroxide-simeth, [Transfer Hold] bisacodyl, [Transfer Hold] dextromethorphan-guaifenesin, HYDROmorphone, HYDROmorphone, [Transfer Hold] ketorolac, [Transfer Hold] melatonin, [Transfer Hold] morphine, [Transfer Hold] nitroglycerin, [Transfer Hold] ondansetron ODT **OR** [Transfer Hold] ondansetron, oxyCODONE, oxyCODONE, oxygen, promethazine  Results for orders placed or performed during the hospital encounter of 10/06/24 (from the past 24 hour(s))   Protime-INR   Result Value Ref Range    Protime 11.3 9.8 - 12.8 seconds    INR 1.0 0.9 - 1.1   Type and Screen   Result Value Ref Range    ABO TYPE O     Rh TYPE POS     ANTIBODY SCREEN NEG    POCT GLUCOSE   Result Value Ref Range    POCT Glucose 169 (H) 74 - 99 mg/dL   Urinalysis with Reflex Culture and Microscopic   Result Value Ref Range    Color, Urine Light-Yellow Light-Yellow, Yellow, Dark-Yellow    Appearance, Urine Clear Clear    Specific Gravity, Urine >1.050 (N) 1.005 - 1.035    pH, Urine 5.5 5.0, 5.5, 6.0, 6.5, 7.0, 7.5, 8.0    Protein, Urine 70 (1+) (A) NEGATIVE, 10 (TRACE), 20 (TRACE) mg/dL    Glucose, Urine Normal Normal mg/dL    Blood, Urine 0.03 (TRACE) (A) NEGATIVE    Ketones, Urine NEGATIVE NEGATIVE mg/dL    Bilirubin, Urine NEGATIVE NEGATIVE    Urobilinogen, Urine Normal Normal mg/dL    Nitrite, Urine NEGATIVE NEGATIVE    Leukocyte Esterase, Urine NEGATIVE NEGATIVE   Urinalysis Microscopic   Result Value Ref Range    WBC, Urine 1-5 1-5, NONE /HPF    RBC, Urine 1-2 NONE, 1-2, 3-5 /HPF    Squamous Epithelial Cells, Urine 1-9 (SPARSE) Reference range not established.  /HPF    Mucus, Urine FEW Reference range not established. /LPF   CBC   Result Value Ref Range    WBC 9.8 4.4 - 11.3 x10*3/uL    nRBC 0.0 0.0 - 0.0 /100 WBCs    RBC 4.28 4.00 - 5.20 x10*6/uL    Hemoglobin 11.0 (L) 12.0 - 16.0 g/dL    Hematocrit 33.0 (L) 36.0 - 46.0 %    MCV 77 (L) 80 - 100 fL    MCH 25.7 (L) 26.0 - 34.0 pg    MCHC 33.3 32.0 - 36.0 g/dL    RDW 14.6 (H) 11.5 - 14.5 %    Platelets 263 150 - 450 x10*3/uL   Comprehensive metabolic panel   Result Value Ref Range    Glucose 122 (H) 74 - 99 mg/dL    Sodium 139 136 - 145 mmol/L    Potassium 4.2 3.5 - 5.3 mmol/L    Chloride 105 98 - 107 mmol/L    Bicarbonate 24 21 - 32 mmol/L    Anion Gap 14 10 - 20 mmol/L    Urea Nitrogen 12 6 - 23 mg/dL    Creatinine 0.77 0.50 - 1.05 mg/dL    eGFR 81 >60 mL/min/1.73m*2    Calcium 9.0 8.6 - 10.3 mg/dL    Albumin 4.1 3.4 - 5.0 g/dL    Alkaline Phosphatase 70 33 - 136 U/L    Total Protein 6.9 6.4 - 8.2 g/dL    AST 12 9 - 39 U/L    Bilirubin, Total 0.3 0.0 - 1.2 mg/dL    ALT 5 (L) 7 - 45 U/L   Magnesium   Result Value Ref Range    Magnesium 1.80 1.60 - 2.40 mg/dL   POCT GLUCOSE   Result Value Ref Range    POCT Glucose 130 (H) 74 - 99 mg/dL     [place] GI cholangiography intraop    Result Date: 10/7/2024  These images are not reportable by radiology and will not be interpreted by  Radiologists.    Electrocardiogram, 12-lead PRN ACS symptoms    Result Date: 10/7/2024  Normal sinus rhythm Nonspecific ST abnormality Abnormal ECG When compared with ECG of 12-DEC-2023 11:47, No significant change was found See ED provider note for full interpretation and clinical correlation Confirmed by Louise Benjamin ([id number]) on 10/7/2024 10:13:40 AM    CT abdomen pelvis w IV contrast    Result Date: 10/6/2024  Interpreted By:  Roberto Mathews, STUDY: CT of the abdomen and pelvis  with contrast dated 10/6/2024.   INDICATION: Abdominal pain and pancreatic ductal dilation.   COMPARISON: None. Correlation is made with same date gallbladder ultrasound.    ACCESSION NUMBER(S): [id number]   ORDERING CLINICIAN: STEFFEN SIMERLINK   TECHNIQUE: Axial CT of the abdomen and pelvis was performed  with intravenous contrast. Sagittal and coronal 2 dimensional reformats were obtained.   FINDINGS: LUNG BASES AND VESSELS:   Atelectasis is seen at the lung bases. No pleural effusion is evident.  The heart is not enlarged. No pericardial effusion is evident.   Calcified atheromatous disease is seen in the visualized arterial tree.   LIVER AND BILIARY TREE:   Is grossly unremarkable. There is intrahepatic biliary dilation most prominent of the central biliary tree. There is prominence of the common bile duct measuring up to 0.9 cm with prominence down to the region near the joining of the pancreatic duct/ampulla of Vater.   GALLBLADDER:   Gallstones are seen in the gallbladder.   SPLEEN:   Is grossly unremarkable.   ADRENAL GLANDS:   Are grossly unremarkable.   PANCREAS:   There is dilation of the pancreatic duct greatest in the head of the pancreas where it measures up to a proximally 0.6 cm. It is prominent down to the level of the joining with the common bile duct. Pancreatic parenchyma is grossly unremarkable as seen on this non pancreas protocol CT examination.   KIDNEYS AND URETERS:   No radio-opaque urinary tract calculus is evident.  No hydroureteronephrosis is evident.   PERITONEUM/RETROPERITONEUM:   No free fluid, free air, or lymphadenopathy is evident in the abdomen or pelvis.   BOWEL:   No bowel obstruction is evident.  The appendix is not definitively identified, but there is no inflammation in its expected anatomic location to suggest appendicitis.  There is diverticulosis coli without CT findings evident to suggest acute diverticulitis.   BLADDER:   Is grossly unremarkable.   REPRODUCTIVE ORGANS:   The uterus is surgically absent. Clips are seen in the pelvis.   ABDOMINAL WALL:   There is a trace fat filled umbilical hernia.   OSSEOUS STRUCTURES:   Degenerative  changes seen of the hips SI joints pubic symphysis and spine.       1. Intra and extrahepatic biliary dilation and pancreatic ductal dilation of indeterminate exact etiology as seen on this exam. MRI/MRCP is recommended for further assessment. Laboratory correlation is also recommended. 2. Cholelithiasis. 3. Other findings as above.   Signed by: Roberto Mathews 10/6/2024 2:57 PM Dictation workstation:   [id number]    US gallbladder    Result Date: 10/6/2024  Interpreted By:  Natan Franks, STUDY: US GALLBLADDER; 12:32 pm   INDICATION: Signs/Symptoms:RUQ pain.   COMPARISON: None.   ACCESSION NUMBER(S): [id number]   ORDERING CLINICIAN: STEFFEN SIMERLINK   TECHNIQUE: Limited abdominal ultrasound of the right upper quadrant was performed utilizing gray scale imaging.   FINDINGS: Liver: Normal echogenicity without focal lesion. No intrahepatic biliary distention. Gallbladder: Multiple stones are identified along the dependent portion of the gallbladder. No gallbladder wall thickening is noted. No pericholecystic fluid is noted. Sonographic Barker's sign: Negative Pancreas: The pancreatic duct is dilated measuring 6 mm. No mass lesions are identified within the visualized portions of the pancreas. CBD: 0.5 cm Right Kidney:  No pathologic findings are noted.       1. Cholelithiasis. 2.  Dilated appearance of the pancreatic duct measuring 6 mm. The etiology for this is not clear from this study and correlation with a CT of the abdomen with attention pancreas with oral and intravenous contrast is recommended.   MACRO: None.   Signed by: Natan Franks 10/6/2024 12:35 PM Dictation workstation:   [id number]     Assessment/Plan      Assessment & Plan  Symptomatic cholelithiasis    74 y.o. female with PMH of previous tobacco use, cholelithiasis, HTN, CAD status post stenting, HLD, DMT2, GERD, glaucoma, emphysema, vitamin D deficiency, RA, hepatitis C, chronic anemia, presented with constipation and right upper quadrant  pain.  Workup with normal/elevated BP, labs notable for hypomagnesemia otherwise stable, gallbladder US showed cholelithiasis, CT abdomen again with cholelithiasis, intra and extrahepatic biliary dilation and pancreatic ductal dilation.  Patient admitted for observation, planned for cholecystectomy.      Symptomatic cholelithiasis, cholecystitis   Intra and extrahepatic biliary dilation, pancreatic ductal dilation  -H/D stable, afebrile, no current s/s cholangitis, LFTs stable   -s/p Laparoscopic cholecystectomy intraoperative cholangiogram today   -nauseated, PRN zofran  -ADAT     HTN  -not on meds at home   -elevated BP, possibly 2/2 pain / stress, monitor for now      Constipation   -bowel regimen in place (agreed to suppository)      HypoMg  -repleted      Other comorbidities as above  -continue medications as ordered and adjust based on clinical course      VTE / GI prophylaxis   -subcutaneous Lovenox, PPI, bowel regimen in place      Discharge disposition  - [facility] pending surgery clearance/ symptom improvement    Interdisciplinary rounding completed with Attending Provider, Bedside RN and TCC.  Labs, results and plan of care discussed and reviewed with Dr. Soy Montano, APRN-CNP

## 2024-10-07 NOTE — PROGRESS NOTES
10/07/24 0651   [facility] Disability Status   Are you deaf or do you have serious difficulty hearing? N   Are you blind or do you have serious difficulty seeing, even when wearing glasses? N   Because of a physical, mental, or emotional condition, do you have serious difficulty concentrating, remembering, or making decisions? (5 years old or older) N   Do you have serious difficulty walking or climbing stairs? N   Do you have serious difficulty dressing or bathing? N   Because of a physical, mental, or emotional condition, do you have serious difficulty doing errands alone such as visiting the doctor? N

## 2024-10-07 NOTE — CONSULTS
"Inpatient consult to Cardiology  Consult performed by: Santa Villagran, APRN-CNP  Consult ordered by: Anastacia Borges, APRN-CNP  Reason for consult: \"pre-op optimization, hx CAD\"      Cards: LEXIE Blandon   History Of Present Illness:    Taylor Richard is a 74 y.o. female BF w/ h/o CAD s/p OM stent 2/22, athero of Ao, HTN, DM, GERD, TOB (quit) presented to emergency room with right upper quadrant pain.  Cardiology is consulted for \"pre-op optimization, hx CAD\"    Afebrile, heart rate 69, blood pressure 145/69, 96% on room air.  Notable labs on admission BUNs/CR 12/0.7, AST/ALT 32/9 lipase 72, high sensitive troponins negative, INR 1.0, H&H 11.0/33.  CT of the abdomen pelvis showed . Intra and extrahepatic biliary dilation and pancreatic ductal  dilation of indeterminate exact etiology as seen on this exam. MRI/MRCP is recommended for further assessment. Laboratory correlation is also recommended. Cholelithiasis. Gallbladder ultrasound cholelithiasis. Dilated appearance of the pancreatic duct measuring 6 mm. The  etiology for this is not clear from this study and correlation with a CT of the abdomen with attention pancreas with oral and intravenous contrast is recommended.    Home cardiac meds include Zetia 10 mg daily and aspirin 81 mg daily(She cannot tolerate statins (constipation/nausea), Imdur (hives), nor BB (fatigue/hair loss).     Admit EKG 10/7/24 SR no acute signs of ischemia      Past Cardiology Tests (Last 3 Years):  Echo 4/20: EF 55%, DD, valves ok, PASP 26   Nuc 4/20: no ischemia/scar, EF >65%   Cath 2/22: LM ok, p-mLAD 40%, p-mOM1 95% (Synergy NISHA), p-mOM2 60%, m-dRCA 60%, dRCA 60%, EF 60%, no AS, no MR   CT chest 1/24: sev CAC, nl heart size, no peric eff, mild AA, no aneurysm, nl PA   Exercise stress echo 12/2023    1. No electrocardiographic or echocardiographic evidence for ischemia at a maximal workload.   2. The resting ejection fraction was estimated at 60% with a peak exercise ejection fraction " estimated at 70%.   3. Patient had mild chest pressure at peak exercise.   4. Adequate level of stress achieved.  Past Medical History:  She has a past medical history of Acute candidiasis of vulva and vagina (01/06/2017), Age-related nuclear cataract, left eye (05/21/2018), Age-related nuclear cataract, right eye (01/10/2018), Cellulitis of left lower limb (05/27/2021), Chronic viral hepatitis C (Multi) (08/14/2017), Combined forms of age-related cataract, bilateral (12/13/2017), Contusion of right eyelid and periocular area, initial encounter (01/10/2018), Cortical age-related cataract, left eye (05/21/2018), Cortical age-related cataract, right eye (01/10/2018), Edema of larynx (12/30/2016), Encounter for immunization (05/26/2021), Hypertensive retinopathy, bilateral (12/19/2022), Meibomian gland dysfunction of unspecified eye, unspecified eyelid (12/19/2022), Myopia, bilateral (12/19/2022), Ocular hypertension, unspecified eye (01/15/2016), Ocular pain, right eye (01/10/2018), Other conditions influencing health status (05/21/2018), Other pulmonary collapse (06/28/2016), Other specified noninflammatory disorders of vulva and perineum (12/15/2014), Pain in right shoulder (04/06/2016), Pain in unspecified ankle and joints of unspecified foot (10/27/2015), Personal history of nicotine dependence (01/04/2023), Personal history of other diseases of the digestive system (08/17/2017), Personal history of other diseases of the respiratory system (05/03/2018), Personal history of other infectious and parasitic diseases (09/15/2021), Personal history of other infectious and parasitic diseases, Personal history of other specified conditions (08/14/2017), Personal history of other specified conditions (03/27/2018), Prediabetes (12/19/2022), Preglaucoma, unspecified, bilateral (12/19/2022), Presence of intraocular lens (02/15/2018), Presence of intraocular lens (12/19/2022), Right upper quadrant pain (10/14/2021), Right  upper quadrant pain (10/14/2021), Strain of unspecified muscle, fascia and tendon at shoulder and upper arm level, right arm, initial encounter (04/06/2016), Systemic lupus erythematosus, unspecified (12/19/2022), Systemic lupus erythematosus, unspecified (11/29/2017), Type 2 diabetes mellitus without complications (Multi) (01/04/2023), Type 2 diabetes mellitus without complications (Multi) (12/13/2017), Type 2 diabetes mellitus without complications (Multi) (10/31/2017), Unspecified symptoms and signs involving the genitourinary system (09/15/2021), and Unspecified visual field defects (04/27/2016).    Past Surgical History:  She has a past surgical history that includes Total abdominal hysterectomy w/ bilateral salpingoophorectomy (11/07/2014); Other surgical history (10/14/2021); Other surgical history (10/14/2021); Other surgical history (10/14/2021); Other surgical history (03/17/2022); and CT angio coronary art with heartflow if score >30% (2/18/2019).      Social History:  She reports that she has never smoked. She has never used smokeless tobacco. She reports current alcohol use. She reports that she does not use drugs.    Family History:  Family History   Problem Relation Name Age of Onset    COPD Mother      Colon cancer Mother      Prostate cancer Father      Multiple myeloma Sister      Hodgkin's lymphoma Brother      Breast cancer Daughter          Allergies:  Acetaminophen, Isosorbide, Pravastatin, Codeine, Losartan, and Penicillins    ROS:  10 point review of systems including (Constitutional, Eyes, ENMT, Respiratory, Cardiac, Gastrointestinal, Neurological, Psychiatric, and Hematologic) was performed and is otherwise negative.    Objective Data:  Last Recorded Vitals:  Vitals:    10/06/24 1915 10/06/24 2022 10/07/24 0000 10/07/24 0500   BP:  138/79 136/73 145/69   BP Location:  Left arm Right arm Right arm   Patient Position:  Lying Lying Lying   Pulse: 84 87 56 69   Resp: 17 18 18 18   Temp:  35.9  °C (96.6 °F) 36.2 °C (97.2 °F) 36 °C (96.8 °F)   TempSrc:  Temporal Temporal Temporal   SpO2: (!) 92% 99% 96% 96%   Weight:       Height:         Medical Gas Therapy: None (Room air)  Weight  Av kg (150 lb)  Min: 68 kg (150 lb)  Max: 68 kg (150 lb)      LABS:  CMP:  Results from last 7 days   Lab Units 10/07/24  0602 10/06/24  1135 10/06/24  0959   SODIUM mmol/L 139  --  137   POTASSIUM mmol/L 4.2 4.1 6.2*   CHLORIDE mmol/L 105  --  104   CO2 mmol/L 24  --  23   ANION GAP mmol/L 14  --  16   BUN mg/dL 12  --  15   CREATININE mg/dL 0.77  --  0.82   EGFR mL/min/1.73m*2 81  --  75   MAGNESIUM mg/dL 1.80 1.30* 1.50*   ALBUMIN g/dL 4.1 3.9 4.4   ALT U/L 5* 7 9   AST U/L 12 15 32   BILIRUBIN TOTAL mg/dL 0.3 0.2 0.2   LIPASE U/L  --   --  72     CBC:  Results from last 7 days   Lab Units 10/07/24  0602 10/06/24  0959   WBC AUTO x10*3/uL 9.8 9.6   HEMOGLOBIN g/dL 11.0* 11.8*   HEMATOCRIT % 33.0* 36.6   PLATELETS AUTO x10*3/uL 263 259   MCV fL 77* 77*     COAG:   Results from last 7 days   Lab Units 10/06/24  1553   INR  1.0     ABO:   ABO TYPE   Date Value Ref Range Status   10/06/2024 O  Final     HEME/ENDO:     CARDIAC:   Results from last 7 days   Lab Units 10/06/24  1135 10/06/24  0959   TROPHS ng/L 8 6             Last I/O:  No intake or output data in the 24 hours ending 10/07/24 0742  Net IO Since Admission: No IO data has been entered for this period [10/07/24 0742]      Imaging Results:  CT abdomen pelvis w IV contrast    Result Date: 10/6/2024  Interpreted By:  Roberto Mathews, STUDY: CT of the abdomen and pelvis  with contrast dated 10/6/2024.   INDICATION: Abdominal pain and pancreatic ductal dilation.   COMPARISON: None. Correlation is made with same date gallbladder ultrasound.   ACCESSION NUMBER(S): [id number]   ORDERING CLINICIAN: STEFFEN SIMERLINK   TECHNIQUE: Axial CT of the abdomen and pelvis was performed  with intravenous contrast. Sagittal and coronal 2 dimensional reformats were obtained.    FINDINGS: LUNG BASES AND VESSELS:   Atelectasis is seen at the lung bases. No pleural effusion is evident.  The heart is not enlarged. No pericardial effusion is evident.   Calcified atheromatous disease is seen in the visualized arterial tree.   LIVER AND BILIARY TREE:   Is grossly unremarkable. There is intrahepatic biliary dilation most prominent of the central biliary tree. There is prominence of the common bile duct measuring up to 0.9 cm with prominence down to the region near the joining of the pancreatic duct/ampulla of Vater.   GALLBLADDER:   Gallstones are seen in the gallbladder.   SPLEEN:   Is grossly unremarkable.   ADRENAL GLANDS:   Are grossly unremarkable.   PANCREAS:   There is dilation of the pancreatic duct greatest in the head of the pancreas where it measures up to a proximally 0.6 cm. It is prominent down to the level of the joining with the common bile duct. Pancreatic parenchyma is grossly unremarkable as seen on this non pancreas protocol CT examination.   KIDNEYS AND URETERS:   No radio-opaque urinary tract calculus is evident.  No hydroureteronephrosis is evident.   PERITONEUM/RETROPERITONEUM:   No free fluid, free air, or lymphadenopathy is evident in the abdomen or pelvis.   BOWEL:   No bowel obstruction is evident.  The appendix is not definitively identified, but there is no inflammation in its expected anatomic location to suggest appendicitis.  There is diverticulosis coli without CT findings evident to suggest acute diverticulitis.   BLADDER:   Is grossly unremarkable.   REPRODUCTIVE ORGANS:   The uterus is surgically absent. Clips are seen in the pelvis.   ABDOMINAL WALL:   There is a trace fat filled umbilical hernia.   OSSEOUS STRUCTURES:   Degenerative changes seen of the hips SI joints pubic symphysis and spine.       1. Intra and extrahepatic biliary dilation and pancreatic ductal dilation of indeterminate exact etiology as seen on this exam. MRI/MRCP is recommended for  further assessment. Laboratory correlation is also recommended. 2. Cholelithiasis. 3. Other findings as above.   Signed by: Roberto Mathews 10/6/2024 2:57 PM Dictation workstation:   [id number]    US gallbladder    Result Date: 10/6/2024  Interpreted By:  Natan Franks, STUDY: US GALLBLADDER; 12:32 pm   INDICATION: Signs/Symptoms:RUQ pain.   COMPARISON: None.   ACCESSION NUMBER(S): [id number]   ORDERING CLINICIAN: STEFFEN SIMERLINK   TECHNIQUE: Limited abdominal ultrasound of the right upper quadrant was performed utilizing gray scale imaging.   FINDINGS: Liver: Normal echogenicity without focal lesion. No intrahepatic biliary distention. Gallbladder: Multiple stones are identified along the dependent portion of the gallbladder. No gallbladder wall thickening is noted. No pericholecystic fluid is noted. Sonographic Barker's sign: Negative Pancreas: The pancreatic duct is dilated measuring 6 mm. No mass lesions are identified within the visualized portions of the pancreas. CBD: 0.5 cm Right Kidney:  No pathologic findings are noted.       1. Cholelithiasis. 2.  Dilated appearance of the pancreatic duct measuring 6 mm. The etiology for this is not clear from this study and correlation with a CT of the abdomen with attention pancreas with oral and intravenous contrast is recommended.   MACRO: None.   Signed by: Natan Franks 10/6/2024 12:35 PM Dictation workstation:   [id number]      Inpatient Medications:  Scheduled medications   Medication Dose Route Frequency    aspirin  81 mg oral Daily    [Held by provider] enoxaparin  40 mg subcutaneous q24h    ezetimibe  10 mg oral Daily    latanoprost  1 drop Both Eyes Nightly    ondansetron  4 mg intravenous Once    pantoprazole  40 mg oral Daily before breakfast    polyethylene glycol  17 g oral BID     PRN medications   Medication    acetaminophen    Or    acetaminophen    Or    acetaminophen    albuterol    alum-mag hydroxide-simeth    bisacodyl     "dextromethorphan-guaifenesin    ketorolac    melatonin    morphine    nitroglycerin    ondansetron ODT    Or    ondansetron     Continuous Medications   Medication Dose Last Rate       Outpatient Medications:  Current Outpatient Medications   Medication Instructions    albuterol 90 mcg/actuation inhaler 2 puffs, inhalation, Every 4 hours PRN    aspirin 81 mg, oral, Daily    azelastine (Optivar) 0.05 % ophthalmic solution 1 drop both eyes 2 times a day as needed for itching/allergies    ezetimibe (ZETIA) 10 mg, oral, Daily    ferrous gluconate (Fergon) 324 (38 Fe) mg tablet Take 1 tablet by mouth with breakfast 3 times per week    latanoprost (Xalatan) 0.005 % ophthalmic solution 1 drop, Both Eyes, Nightly    metFORMIN (Glucophage) 500 mg tablet TAKE 2 TABLETS BY MOUTH IN THE  MORNING AND 1 TABLET IN THE  EVENING    nitroglycerin (NITROSTAT) 0.4 mg, sublingual, Every 5 min PRN, Max 3 tablets in 15 minutes. Call 911 if pain persists    omeprazole (PRILOSEC) 20 mg, oral, Daily before breakfast, Do not crush or chew.    OneTouch Ultra Test strip Use to check blood sugar 3 times daily.       Physical Exam:  General:  Patient is awake, alert, and oriented.  Patient is in no acute distress.  HEENT:  Pupils equal and reactive.  Normocephalic.  Moist mucosa.    Neck:  No thyromegaly.  Normal Jugular Venous Pressure.  Cardiovascular:  Regular rate and rhythm.  Normal S1 and S2.  Pulmonary:  Clear to auscultation bilaterally.  Abdomen:  Soft. Non-tender.   Non-distended.  Positive bowel sounds.  Lower Extremities:  2+ pedal pulses. No LE edema.  Neurologic:  Cranial nerves intact.  No focal deficit.   Skin: Skin warm and dry, normal skin turgor.   Psychiatric: Normal affect.     Assessment/Plan   Cards: LEXIE Richard is a 74 y.o. female BF w/ h/o CAD s/p OM stent 2/22, athero of Ao, HTN, DM, GERD, TOB (quit) presented to emergency room with right upper quadrant pain.  Cardiology is consulted for \"pre-op " "optimization, hx CAD\"    Home cardiac meds include Zetia 10 mg daily and aspirin 81 mg daily(She cannot tolerate statins (constipation/nausea), Imdur (hives), nor BB (fatigue/hair loss).     #CAD s/p OM stent 2/22  #Hypertension-controlled  #History of tobacco use    RECS:  -Patient is moderate risk for cardiac complications. RCRI Class IV Risk-> 15.0% 30-day risk of death, MI or cardiac arrest.  -OK to proceed with cholestectomy with ASA    Code Status:  Full Code    I spent 30 minutes in the professional and overall care of this patient.        GISELA Beach-CNP     STAFF ADDENDUM:    Both the CROW and I have had a face to face encounter with the patient today. I have examined the patient and edited the documented physical examination as necessary.  I personally reviewed the patient's vital signs, telemetry, recent labs, medications, orders, EKGs, and pertinent cardiac imaging/ echocardiography.  I have reviewed the CROW's encounter note, approve the CROW's documentation and have edited the note to reflect my diagnostic and therapeutic plan.      74 y.o. female BF w/ h/o CAD s/p OM stent 2/22, athero of Ao, HTN, DM, GERD, TOB (quit) presented to emergency room with right upper quadrant pain.  Cardiology is consulted for \"pre-op optimization, hx CAD\" plan for cholecystectomy.  She denies any chest pain or pressure.  No dyspnea.    Preoperative cardiovascular risk assessment for noncardiac surgery: Per ACC/AHA guidelines, this is an elevated risk surgery planned. Functional capacity is good (>4 mets). No active cardiac conditions (decompensated valvular heart disease, decompensated heart failure, acute coronary syndrome including unstable angina, uncontrolled significant arrhythmia). Clinical risk factors: CVA no; CHF no; known CAD yes; IDDM no; Cr>2 no.     PLAN:  The patient does not meet criteria for further diagnostic cardiovascular testing and patient may proceed with surgery with low to moderate but " acceptable risk for perioperative cardiovascular events.    Please continue aspirin.  Follow-up with primary cardiologist postoperatively    Estimated post op risk of combined nonfatal MI, nonfatal cardiac arrest, and cardiac death is ~1% (CMAJ. 2005;173(6):627)    Will sign off  Please contact us back if can be of further assistance    Marco Antonio Villalba DO

## 2024-10-07 NOTE — DISCHARGE INSTRUCTIONS
Instructions After Gallbladder Surgery    Wound Care:  -Ice packs to wounds every hour the first day  -Ok to shower in 24 hours  -no baths or swimming for 2 weeks  -keep wound clean and dry  -do not apply topical creams or ointments  -call if you notice redness around wound, foul-smelling drainage, or increasing pain     Diet:   -Avoid greasy, fatty foods first few weeks   -Keavy, soft meals first day or two after surgery    Activity   -Take it easy for the first 48 hours   -stairs and walks are fine   -resume activities gradually over the first week   -ask Dr Ruth before resuming strenuous physical exertions   -No driving while on pain medication    Medications   -Pain medicine prescription attached for severe pain   -You can also take Motrin/Ibuprofen 400mg every 6 hours for mild pain   -Resume your home medications unless otherwise directed   -To avoid constipation please take Colace 100mg twice a day (over the counter)    Other Instructions   -Call to make appointment within 1-2 days:  [phone]   -Call the doctor for the following:  severe unrelieved pain  fevers > 101F  Nausea/vomiting  wound issues  insurance/return to work forms  shortness of breath  chest pains   -Feedback on your surgical experience is appreciated!

## 2024-10-07 NOTE — ANESTHESIA PREPROCEDURE EVALUATION
Patient: Taylor Richard    Procedure Information       Date/Time: 10/07/24 1030    Procedure: Cholecystectomy Laparoscopy    Location: [facility] OR 02 / [facility]    Surgeons: Efra Ruth MD            Relevant Problems   Cardiac   (+) Arteriosclerosis of coronary artery   (+) Benign essential hypertension   (+) Chest pain   (+) HLD (hyperlipidemia)      Pulmonary   (+) Centrilobular emphysema (Multi)      GI   (+) GERD (gastroesophageal reflux disease)      Liver   (+) Cholelithiasis   (+) Chronic liver disease   (+) Symptomatic cholelithiasis      Hematology   (+) Anemia   (+) Antiphospholipid syndrome (Multi)      Musculoskeletal   (+) Degenerative arthritis of lumbar spine   (+) Osteoarthritis   (+) Rheumatoid arthritis involving multiple sites with positive rheumatoid factor (Multi)      HEENT   (+) Hearing loss sensory, bilateral   (+) POAG (primary open-angle glaucoma)       Clinical information reviewed:    Allergies  Meds                Past Medical History:   Diagnosis Date   • Acute candidiasis of vulva and vagina 01/06/2017    Vaginal candida   • Age-related nuclear cataract, left eye 05/21/2018    Age-related nuclear cataract, left   • Age-related nuclear cataract, right eye 01/10/2018    Age-related nuclear cataract, right   • Cellulitis of left lower limb 05/27/2021    Cellulitis of left lower extremity   • Chronic viral hepatitis C (Multi) 08/14/2017    Chronic viral hepatitis C   • Combined forms of age-related cataract, bilateral 12/13/2017    Combined form of age-related cataract, both eyes   • Contusion of right eyelid and periocular area, initial encounter 01/10/2018    Periorbital ecchymosis of right eye   • Cortical age-related cataract, left eye 05/21/2018    Cortical age-related cataract of left eye   • Cortical age-related cataract, right eye 01/10/2018    Cortical age-related cataract of right eye   • Edema of larynx 12/30/2016    Vocal cord edema   • Encounter for immunization  05/26/2021    Encounter for immunization   • Hypertensive retinopathy, bilateral 12/19/2022    Hypertensive retinopathy of both eyes   • Meibomian gland dysfunction of unspecified eye, unspecified eyelid 12/19/2022    Meibomian gland dysfunction (MGD)   • Myopia, bilateral 12/19/2022    Myopia of both eyes with astigmatism and presbyopia   • Ocular hypertension, unspecified eye 01/15/2016    Elevated IOP   • Ocular pain, right eye 01/10/2018    Pain of right orbit   • Other conditions influencing health status 05/21/2018    History of cough   • Other pulmonary collapse 06/28/2016    Collapsed lung   • Other specified noninflammatory disorders of vulva and perineum 12/15/2014    Vulvar lesion   • Pain in right shoulder 04/06/2016    Right shoulder pain   • Pain in unspecified ankle and joints of unspecified foot 10/27/2015    Ankle joint pain   • Personal history of nicotine dependence 01/04/2023    Former cigarette smoker   • Personal history of other diseases of the digestive system 08/17/2017    History of cholelithiasis   • Personal history of other diseases of the respiratory system 05/03/2018    History of acute bronchitis   • Personal history of other infectious and parasitic diseases 09/15/2021    History of candidiasis of vagina   • Personal history of other infectious and parasitic diseases     History of hepatitis   • Personal history of other specified conditions 08/14/2017    History of nausea   • Personal history of other specified conditions 03/27/2018    History of abdominal pain   • Prediabetes 12/19/2022    Pre-diabetes   • Preglaucoma, unspecified, bilateral 12/19/2022    Glaucoma suspect of both eyes   • Presence of intraocular lens 02/15/2018    Bilateral pseudophakia   • Presence of intraocular lens 12/19/2022    Pseudophakia of right eye   • Right upper quadrant pain 10/14/2021    Right upper quadrant pain   • Right upper quadrant pain 10/14/2021    RUQ pain   • Strain of unspecified muscle,  fascia and tendon at shoulder and upper arm level, right arm, initial encounter 04/06/2016    Right shoulder strain   • Systemic lupus erythematosus, unspecified 12/19/2022    Lupus (systemic lupus erythematosus)   • Systemic lupus erythematosus, unspecified 11/29/2017    History of systemic lupus erythematosus (SLE)   • Type 2 diabetes mellitus without complications (Multi) 01/04/2023    Diabetes mellitus   • Type 2 diabetes mellitus without complications (Multi) 12/13/2017    Controlled diabetes mellitus type II without complication   • Type 2 diabetes mellitus without complications (Multi) 10/31/2017    Controlled diabetes mellitus type II without complication   • Unspecified symptoms and signs involving the genitourinary system 09/15/2021    UTI symptoms   • Unspecified visual field defects 04/27/2016    Visual field defect      Past Surgical History:   Procedure Laterality Date   • CT ANGIO CORONARY ART WITH HEARTFLOW IF SCORE >30%  2/18/2019    CT HEART CORONARY ANGIOGRAM 2/18/2019 [facility]   • OTHER SURGICAL HISTORY  10/14/2021    Complete colonoscopy   • OTHER SURGICAL HISTORY  10/14/2021    Endoscopy   • OTHER SURGICAL HISTORY  10/14/2021    Oral surgery   • OTHER SURGICAL HISTORY  03/17/2022    Cardiac catheterization with stent placement   • TOTAL ABDOMINAL HYSTERECTOMY W/ BILATERAL SALPINGOOPHORECTOMY  11/07/2014    Total Abdominal Hysterectomy With Removal Of Both Ovaries     Social History     Tobacco Use   • Smoking status: Never   • Smokeless tobacco: Never   Substance Use Topics   • Alcohol use: Yes     Comment: once every other day: wine   • Drug use: Never      Current Outpatient Medications   Medication Instructions   • albuterol 90 mcg/actuation inhaler 2 puffs, inhalation, Every 4 hours PRN   • aspirin 81 mg, oral, Daily   • azelastine (Optivar) 0.05 % ophthalmic solution 1 drop both eyes 2 times a day as needed for itching/allergies   • ezetimibe (ZETIA) 10 mg, oral, Daily   • ferrous  "gluconate (Fergon) 324 (38 Fe) mg tablet Take 1 tablet by mouth with breakfast 3 times per week   • latanoprost (Xalatan) 0.005 % ophthalmic solution 1 drop, Both Eyes, Nightly   • metFORMIN (Glucophage) 500 mg tablet TAKE 2 TABLETS BY MOUTH IN THE  MORNING AND 1 TABLET IN THE  EVENING   • nitroglycerin (NITROSTAT) 0.4 mg, sublingual, Every 5 min PRN, Max 3 tablets in 15 minutes. Call 911 if pain persists   • omeprazole (PRILOSEC) 20 mg, oral, Daily before breakfast, Do not crush or chew.   • OneTouch Ultra Test strip Use to check blood sugar 3 times daily.      Allergies   Allergen Reactions   • Acetaminophen Other     \"Didn't sit right with me\"   • Isosorbide Hives   • Pravastatin Nausea Only and Unknown   • Codeine Unknown and Rash   • Losartan Rash   • Penicillins Unknown and Rash        Chemistry    Lab Results   Component Value Date/Time     10/07/2024 0602    K 4.2 10/07/2024 0602     10/07/2024 0602    CO2 24 10/07/2024 0602    BUN 12 10/07/2024 0602    CREATININE 0.77 10/07/2024 0602    Lab Results   Component Value Date/Time    CALCIUM 9.0 10/07/2024 0602    ALKPHOS 70 10/07/2024 0602    AST 12 10/07/2024 0602    ALT 5 (L) 10/07/2024 0602    BILITOT 0.3 10/07/2024 0602          Lab Results   Component Value Date    HGBA1C 6.8 (A) 07/24/2024     Lab Results   Component Value Date/Time    WBC 9.8 10/07/2024 0602    HGB 11.0 (L) 10/07/2024 0602    HCT 33.0 (L) 10/07/2024 0602     10/07/2024 0602     Lab Results   Component Value Date/Time    PROTIME 11.3 10/06/2024 1553    INR 1.0 10/06/2024 1553     No results found for: \"ABORH\"  Encounter Date: 10/06/24   Electrocardiogram, 12-lead PRN ACS symptoms   Result Value    Ventricular Rate 67    Atrial Rate 67    AK Interval 158    QRS Duration 80    QT Interval 408    QTC Calculation(Bazett) 431    P Axis 62    R Axis -6    T Axis 5    QRS Count 11    Q Onset 230    P Onset 151    P Offset 196    T Offset 434    QTC Fredericia 423    Narrative " "   Normal sinus rhythm  Nonspecific ST abnormality  Abnormal ECG  When compared with ECG of 12-DEC-2023 11:47,  No significant change was found     Stress Echo 12/12/2023  Summary:   1. No electrocardiographic or echocardiographic evidence for ischemia at a maximal workload.   2. The resting ejection fraction was estimated at 60% with a peak exercise ejection fraction estimated at 70%.   3. Patient had mild chest pressure at peak exercise.   4. Adequate level of stress achieved.     [id number] Aung Blandon MD  Electronically signed on 12/12/2023 at 3:58:12 PM    No results found for this or any previous visit from the past 1095 days.       Visit Vitals  /79 (BP Location: Left arm, Patient Position: Lying)   Pulse 64   Temp 36 °C (96.8 °F) (Temporal)   Resp 18   Ht 1.549 m (5' 0.98\")   Wt 68 kg (150 lb)   SpO2 96%   BMI 28.36 kg/m²   Smoking Status Never   BSA 1.71 m²     No data recorded    Physical Exam    Airway  Mallampati: I  TM distance: >3 FB  Neck ROM: full     Cardiovascular   Rhythm: regular  Rate: normal     Dental   (+) upper dentures     Pulmonary    Abdominal        Anesthesia Plan    History of general anesthesia?: yes  History of complications of general anesthesia?: no    ASA 3     general     The patient is not a current smoker.    intravenous induction   Postoperative administration of opioids is intended.  Trial extubation is planned.  Anesthetic plan and risks discussed with patient.  Use of blood products discussed with patient who consented to blood products.    Plan discussed with CAA and attending.    "

## 2024-10-07 NOTE — OP NOTE
Cholecystectomy Laparoscopy with cholangiogram Operative Note     Date: 10/6/2024 - 10/7/2024  OR Location: [facility]    Name: Taylor Richard : 1950, Age: 74 y.o., MRN: [id number], Sex: female    Diagnosis  Pre-op Diagnosis      * Symptomatic cholelithiasis [K80.20] Post-op Diagnosis     * Symptomatic cholelithiasis [K80.20]     Procedures  Laparoscopic cholecystectomy intraoperative cholangiogram    Surgeons      * Efra Ruth - Primary    Resident/Fellow/Other Assistant:  Surgeons and Role:  * No surgeons found with a matching role *    Procedure Summary  Anesthesia: Anesthesia type not filed in the log.  ASA: III  Anesthesia Staff: Anesthesiologist: Doni Reyes DO  C-AA: NIHARIKA Murguia; NIHARIKA De Leon  Estimated Blood Loss: 25mL  Intra-op Medications:   Administrations occurring from 1030 to 1205 on 10/07/24:   Medication Name Total Dose   ioversol (Optiray-320) injection 20 mL              Anesthesia Record               Intraprocedure I/O Totals       None           Specimen:   ID Type Source Tests Collected by Time   1 : GALLBLADDER Tissue GALLBLADDER CHOLECYSTECTOMY SURGICAL PATHOLOGY EXAM Efra Ruth MD 10/7/2024 1121        Staff:   Circulator: Suzanne Coyne Person: Bia Coyne Person: Abhinav Huitron Circulator: Janay         Drains and/or Catheters: * None in log *    Tourniquet Times:         Implants:     Findings: Cholangiogram showed some narrowing of the distal duct but contrast did go into the duodenum.  Acutely inflamed intrahepatic gallbladder    Indications: Taylor Richard is an 74 y.o. female who is having surgery for Symptomatic cholelithiasis [K80.20].     The patient was seen in the preoperative area. The risks, benefits, complications, treatment options, non-operative alternatives, expected recovery and outcomes were discussed with the patient. The possibilities of reaction to medication, pulmonary aspiration, injury to surrounding structures, bleeding,  recurrent infection, the need for additional procedures, failure to diagnose a condition, and creating a complication requiring transfusion or operation were discussed with the patient. The patient concurred with the proposed plan, giving informed consent.  The site of surgery was properly noted/marked if necessary per policy. The patient has been actively warmed in preoperative area. Preoperative antibiotics have been ordered and given within 1 hours of incision. Venous thrombosis prophylaxis have been ordered including bilateral sequential compression devices    Procedure Details:  Description:  Patient was brought to the operating room placed supine on the table.  Timeout was performed which confirmed patient and procedure.  Perioperative antibiotics were administered.  Gen. anesthesia was administered through an endotracheal tube.  The abdomen was prepped and draped sterilely.    I injected half percent Marcaine and then made a sharp supraumbilical incision with the knife.  Sharp incision was made in the fascia.  The peritoneal cavity was entered under direct visualization and a Staci port was placed.  The abdomen was insufflated and the patient was placed in steep reverse Trendelenburg.  A 5 mm 30° camera was introduced.  I placed a right upper quadrant 5 mm port and another 5 mL port in the midline subxiphoid area.  The gallbladder was visualized.  It was under some tension so I decompressed it with a needle and syringe through the subxiphoid port.  It was grasped and retracted superiorly into the right.  It was kind of the intrahepatic gallbladder and so I had to take it down out of the liver bed in a top-down fashion to establish better visualization.  Meticulous dissection was then performed in the area of Calot triangle.  Critical view was achieved.  Posterior cystic plate visualized.  The cystic artery and cystic duct were skeletonized.  The artery was divided between hemoclips.  Endo Clip placed on the  gallbladder side and then made a ductotomy with EndoShears.  Ranfac cholangiocatheter was inserted through a separate angiocatheter sheath.  Cholangiogram was obtained using C-arm fluoroscopy.  The anatomy was noted to be normal.  No obvious filling defects seen.  There was some narrowing of the distal common duct but contrast did go into the duodenum.  Ranfac catheter removed.  I placed 3 clips on the distal cystic duct and one toward the gallbladder and divided with EndoShears.  The gallbladder was then dissected atraumatically out of the liver bed with hook cautery.  Once it was liberated it was placed in the Endo Catch bag and brought out through the umbilicus.  Liver bed was inspected and noted to be hemostatic.  Clips were noted to be in good position.  Gentle irrigation was performed.  The effluent was noted to be clear.  The ports were removed under direct visualization.  The abdomen was desufflated.  The fascia at the umbilicus was closed with 0 Vicryl.  Wounds were irrigated.  Skin incisions were closed with 4-0 Biosyn and Dermabond.  Patient was ultimately extubated and transferred to the recovery room in satisfactory condition.    Complications:  None; patient tolerated the procedure well.    Disposition: PACU - hemodynamically stable.  Condition: stable         Additional Details:     Attending Attestation: I was present for the entire procedure.    Efra Ruth  Phone Number: [phone]

## 2024-10-07 NOTE — CARE PLAN
The patient's goals for the shift include      The clinical goals for the shift include Maintain pt. comfort    Over the shift, the patient did not make progress toward the following goals. Barriers to progression include . Recommendations to address these barriers include .

## 2024-10-07 NOTE — POST-PROCEDURE NOTE
Ms. Richard is a 74 year old female who is POD #0 from a laparoscopic cholecystectomy with intraoperative cholangiogram (Intraoperative Findings: Cholangiogram showed some narrowing of the distal duct but contrast did go into the duodenum. Acutely inflamed intrahepatic gallbladder). She is endorsing abdominal discomfort post-operatively, especially when moving around in bed. She denies any nausea or vomiting. She has urinated since OR.     PE:  Constitutional: A&Ox3, calm and cooperative, NAD.  Eyes: PERRL, clear sclera.  ENMT: Moist mucous membranes.  Head/Neck: Neck supple.  Cardiovascular: Normal rate and regular rhythm.   Respiratory/Thorax: Unlabored breathing.   Gastrointestinal: Abdomen slightly distended, soft, appropriately tender to palpation, no peritoneal signs, laparotomy sites c/d/i, well approximated with Dermabond, no erythema or drainage.  Genitourinary: Voiding independently without difficulty.  Musculoskeletal: ROM intact.  Neurological: A&Ox3, No focal deficits.  Psychological: Appropriate mood and behavior.  Skin: Warm and dry.    Radiology and labs reviewed. VSS, afebrile.    Plan:   - Diet: 40 gram fat  - Continue current pain regimen   - PRN antiemetic   - DVT Proph: SCDs/ ambulate/ Lovenox  - Bowel regimen: Miralax, PRN Dulcolax  - Monitor VS every 4 hours   - Labs ordered for AM - LFTs ordered for AM  - IS every hour while awake   - Encourage ambulation / OOB as tolerated   - Monitor lap sites for drainage, erythema, excessive bruising   - Continue supportive care  - F/u with Dr. Ruth outpatient in 10-14 days once d/c from hospital     Dispo: Pain and nausea control as needed. OOB as able. Anticipate DC from a surgical perspective tomorrow.     Total time spent 25 minutes, and greater than 50% of time was spent in counseling/coordination of care.

## 2024-10-07 NOTE — CARE PLAN
Problem: Fall/Injury  Goal: Not fall by end of shift  Outcome: Progressing  Goal: Be free from injury by end of the shift  Outcome: Progressing  Goal: Verbalize understanding of personal risk factors for fall in the hospital  Outcome: Progressing  Goal: Verbalize understanding of risk factor reduction measures to prevent injury from fall in the home  Outcome: Progressing  Goal: Use assistive devices by end of the shift  Outcome: Progressing  Goal: Pace activities to prevent fatigue by end of the shift  Outcome: Progressing   The patient's goals for the shift include      The clinical goals for the shift include      Over the shift, the patient did make progress toward the following goals.

## 2024-10-07 NOTE — NURSING NOTE
"Patient returned to unit at approximaately 1500.   Denies any pain but states that she is nauseated -- feels like she might \"throw up\".   4 mgs of Zofran administered at approximately 1315.   Will continue to monitor patient.  Abdominal area assessed.  Three sites with derma bond noted.  No redness or drainage noted.    Joselin Colon, AURELIAN, RN  "

## 2024-10-07 NOTE — PROGRESS NOTES
Transitional Care Coordination Progress Note:  Plan per Medical/Surgical team: treatment of jose with IV toradol, morphine, surgery consult & cardio for clearance  Status: Observation  Payor source: [facility]  Discharge disposition: Home alone   Potential Barriers: surgery today  ADOD: 10/8/2024   OLLIE Daly RN, BSN Transitional Care Coordinator ED# [phone]      10/07/24 0651   Discharge Planning   Living Arrangements Alone   Support Systems Friends/neighbors   Assistance Needed surgery today   Type of Residence Private residence   Number of Stairs to Enter Residence 0   Number of Stairs Within Residence 0   Home or Post Acute Services None   Expected Discharge Disposition Home   Does the patient need discharge transport arranged? No   Financial Resource Strain   How hard is it for you to pay for the very basics like food, housing, medical care, and heating? Not hard   Housing Stability   In the last 12 months, was there a time when you were not able to pay the mortgage or rent on time? N   In the past 12 months, how many times have you moved where you were living? 1   At any time in the past 12 months, were you homeless or living in a shelter (including now)? N   Transportation Needs   In the past 12 months, has lack of transportation kept you from medical appointments or from getting medications? no   In the past 12 months, has lack of transportation kept you from meetings, work, or from getting things needed for daily living? No

## 2024-10-08 VITALS
DIASTOLIC BLOOD PRESSURE: 63 MMHG | HEIGHT: 61 IN | TEMPERATURE: 99.3 F | SYSTOLIC BLOOD PRESSURE: 152 MMHG | BODY MASS INDEX: 28.32 KG/M2 | WEIGHT: 150 LBS | RESPIRATION RATE: 18 BRPM | OXYGEN SATURATION: 96 % | HEART RATE: 75 BPM

## 2024-10-08 LAB
ALBUMIN SERPL BCP-MCNC: 4 G/DL (ref 3.4–5)
ALP SERPL-CCNC: 76 U/L (ref 33–136)
ALT SERPL W P-5'-P-CCNC: 23 U/L (ref 7–45)
ANION GAP SERPL CALC-SCNC: 11 MMOL/L (ref 10–20)
AST SERPL W P-5'-P-CCNC: 36 U/L (ref 9–39)
BILIRUB DIRECT SERPL-MCNC: 0 MG/DL (ref 0–0.3)
BILIRUB SERPL-MCNC: 0.3 MG/DL (ref 0–1.2)
BUN SERPL-MCNC: 19 MG/DL (ref 6–23)
CALCIUM SERPL-MCNC: 8.8 MG/DL (ref 8.6–10.3)
CHLORIDE SERPL-SCNC: 101 MMOL/L (ref 98–107)
CO2 SERPL-SCNC: 28 MMOL/L (ref 21–32)
CREAT SERPL-MCNC: 1.03 MG/DL (ref 0.5–1.05)
EGFRCR SERPLBLD CKD-EPI 2021: 57 ML/MIN/1.73M*2
ERYTHROCYTE [DISTWIDTH] IN BLOOD BY AUTOMATED COUNT: 14.7 % (ref 11.5–14.5)
GLUCOSE SERPL-MCNC: 108 MG/DL (ref 74–99)
HCT VFR BLD AUTO: 29.3 % (ref 36–46)
HGB BLD-MCNC: 9.8 G/DL (ref 12–16)
MCH RBC QN AUTO: 25.1 PG (ref 26–34)
MCHC RBC AUTO-ENTMCNC: 33.4 G/DL (ref 32–36)
MCV RBC AUTO: 75 FL (ref 80–100)
NRBC BLD-RTO: 0 /100 WBCS (ref 0–0)
PLATELET # BLD AUTO: 248 X10*3/UL (ref 150–450)
POTASSIUM SERPL-SCNC: 3.9 MMOL/L (ref 3.5–5.3)
PROT SERPL-MCNC: 6.9 G/DL (ref 6.4–8.2)
RBC # BLD AUTO: 3.9 X10*6/UL (ref 4–5.2)
SODIUM SERPL-SCNC: 136 MMOL/L (ref 136–145)
WBC # BLD AUTO: 13.8 X10*3/UL (ref 4.4–11.3)

## 2024-10-08 PROCEDURE — 84075 ASSAY ALKALINE PHOSPHATASE: CPT | Performed by: SURGERY

## 2024-10-08 PROCEDURE — 85027 COMPLETE CBC AUTOMATED: CPT | Performed by: SURGERY

## 2024-10-08 PROCEDURE — 36415 COLL VENOUS BLD VENIPUNCTURE: CPT | Performed by: SURGERY

## 2024-10-08 PROCEDURE — 2500000002 HC RX 250 W HCPCS SELF ADMINISTERED DRUGS (ALT 637 FOR MEDICARE OP, ALT 636 FOR OP/ED): Performed by: SURGERY

## 2024-10-08 PROCEDURE — 2500000004 HC RX 250 GENERAL PHARMACY W/ HCPCS (ALT 636 FOR OP/ED): Performed by: SURGERY

## 2024-10-08 PROCEDURE — 96376 TX/PRO/DX INJ SAME DRUG ADON: CPT

## 2024-10-08 PROCEDURE — 82248 BILIRUBIN DIRECT: CPT | Performed by: NURSE PRACTITIONER

## 2024-10-08 PROCEDURE — 2500000001 HC RX 250 WO HCPCS SELF ADMINISTERED DRUGS (ALT 637 FOR MEDICARE OP): Performed by: SURGERY

## 2024-10-08 PROCEDURE — G0378 HOSPITAL OBSERVATION PER HR: HCPCS

## 2024-10-08 RX ADMIN — OXYCODONE HYDROCHLORIDE 5 MG: 5 TABLET ORAL at 15:30

## 2024-10-08 RX ADMIN — OXYCODONE HYDROCHLORIDE 5 MG: 5 TABLET ORAL at 11:10

## 2024-10-08 RX ADMIN — ASPIRIN 81 MG: 81 TABLET, COATED ORAL at 10:08

## 2024-10-08 RX ADMIN — EZETIMIBE 10 MG: 10 TABLET ORAL at 10:08

## 2024-10-08 RX ADMIN — KETOROLAC TROMETHAMINE 15 MG: 30 INJECTION INTRAMUSCULAR; INTRAVENOUS at 06:19

## 2024-10-08 RX ADMIN — ACETAMINOPHEN 650 MG: 325 TABLET, FILM COATED ORAL at 15:29

## 2024-10-08 ASSESSMENT — COGNITIVE AND FUNCTIONAL STATUS - GENERAL
CLIMB 3 TO 5 STEPS WITH RAILING: A LITTLE
MOBILITY SCORE: 23
DAILY ACTIVITIY SCORE: 24

## 2024-10-08 ASSESSMENT — PAIN SCALES - GENERAL
PAINLEVEL_OUTOF10: 0 - NO PAIN
PAINLEVEL_OUTOF10: 0 - NO PAIN
PAINLEVEL_OUTOF10: 4
PAINLEVEL_OUTOF10: 2

## 2024-10-08 ASSESSMENT — PAIN - FUNCTIONAL ASSESSMENT
PAIN_FUNCTIONAL_ASSESSMENT: WONG-BAKER FACES
PAIN_FUNCTIONAL_ASSESSMENT: 0-10

## 2024-10-08 ASSESSMENT — PAIN DESCRIPTION - LOCATION: LOCATION: ABDOMEN

## 2024-10-08 NOTE — PROGRESS NOTES
Taylor Richard is a 74 y.o. female on day 0 of admission presenting with Symptomatic cholelithiasis.    Plan: s/p lap jose yesterday, pain and nausea control. Possible DC today vs tomorrow.  Disposition: Home Alone  Barrier: pain control, advance diet  ADOD:  today/tomorrow     10/08/24 0738   Discharge Planning   Expected Discharge Disposition Home       Gosia Sandoval RN

## 2024-10-08 NOTE — ANESTHESIA POSTPROCEDURE EVALUATION
Patient: Taylor Richard    Procedure Summary       Date: 10/07/24 Room / Location: [facility] OR 02 / [facility]    Anesthesia Start: 1037 Anesthesia Stop: 1243    Procedure: Cholecystectomy Laparoscopy with cholangiogram Diagnosis:       Symptomatic cholelithiasis      (Symptomatic cholelithiasis [K80.20])    Surgeons: Efra Ruth MD Responsible Provider: Doni Reyes DO    Anesthesia Type: general ASA Status: 3            Anesthesia Type: general    Vitals Value Taken Time   /71 10/07/24 2000   Temp 36.7 °C (98 °F) 10/07/24 2000   Pulse 85 10/07/24 2000   Resp 16 10/07/24 2000   SpO2 94 % 10/07/24 2000       Anesthesia Post Evaluation    Patient location during evaluation: PACU  Patient participation: complete - patient participated  Level of consciousness: awake and alert  Pain management: adequate  Airway patency: patent  Cardiovascular status: acceptable  Respiratory status: acceptable  Hydration status: acceptable  Postoperative Nausea and Vomiting: none        No notable events documented.

## 2024-10-08 NOTE — CARE PLAN
The patient's goals for the shift include to be comfortable    The clinical goals for the shift include Patient will remain HDS throughout entire shift    Over the shift, the patient did not make progress toward the following goals. Barriers to progression include. Recommendations to address these barriers includeto be comfort.

## 2024-10-08 NOTE — DISCHARGE SUMMARY
Discharge Diagnosis  Symptomatic cholelithiasis    Issues Requiring Follow-Up  Please follow up with your PCP and with Dr. Ruth in 2 weeks     Discharge Meds     Medication List      CHANGE how you take these medications     ferrous gluconate 324 (38 Fe) mg tablet; Commonly known as: Fergon; Take   1 tablet by mouth with breakfast 3 times per week; What changed: how much   to take, how to take this, when to take this   metFORMIN 500 mg tablet; Commonly known as: Glucophage; TAKE 2 TABLETS   BY MOUTH IN THE  MORNING AND 1 TABLET IN THE  EVENING; What changed: how   much to take, how to take this, when to take this     CONTINUE taking these medications     albuterol 90 mcg/actuation inhaler; INHALE 2 INHALATIONS BY MOUTH  EVERY   4 HOURS IF NEEDED FOR  WHEEZING OR SHORTNESS OF BREATH   aspirin 81 mg EC tablet   azelastine 0.05 % ophthalmic solution; Commonly known as: Optivar; 1   drop both eyes 2 times a day as needed for itching/allergies   ezetimibe 10 mg tablet; Commonly known as: Zetia; Take 1 tablet (10 mg)   by mouth once daily.   latanoprost 0.005 % ophthalmic solution; Commonly known as: Xalatan;   Administer 1 drop into both eyes once daily at bedtime.   nitroglycerin 0.4 mg SL tablet; Commonly known as: Nitrostat; DISSOLVE 1   TABLET UNDER THE  TONGUE EVERY 5 MINUTES AS NEEDED FOR CHEST PAIN. MAX OF   3 TABLETS IN 15 MINUTES. CALL 911 IF PAIN  PERSISTS.   omeprazole 20 mg DR capsule; Commonly known as: PriLOSEC; Take 1 capsule   (20 mg) by mouth once daily in the morning. Take before meals. Do not   crush or chew.   OneTouch Ultra Test strip; Generic drug: blood sugar diagnostic; Use to   check blood sugar 3 times daily.       Test Results Pending At Discharge  Pending Labs       Order Current Status    Extra Urine Gray Tube Collected (10/06/24 1530)    Surgical Pathology Exam In process    Urinalysis with Reflex Culture and Microscopic In process            Hospital Course  This is a 74 y.o. female with  PMH of previous tobacco use, cholelithiasis, HTN, CAD status post stenting, HLD, DMT2, GERD, glaucoma, emphysema, vitamin D deficiency, RA, hepatitis C, chronic anemia, presented with constipation and right upper quadrant pain.  Patient developed RUQ pain radiating into her back last Monday, eventually subsided but recurred yesterday -she went to urgent care and advised to come to ED.   Also reports constipation for 2 weeks, has only had small amounts of stool following laxatives.  Denies nausea/vomiting, + flatus. Denies fevers/chills.   Denies any recent CP/SOB, complaint with all meds including cardiac.   Workup with normal/elevated BP, labs notable for hypomagnesemia otherwise stable, gallbladder US showed cholelithiasis, CT abdomen again with cholelithiasis, intra and extrahepatic biliary dilation and pancreatic ductal dilation.  Patient admitted for observation, planned for cholecystectomy.     Observation Course:  Patient underwent cholecystectomy 10/7/24. Had some abd discomfort post procedure but was feeling much better POD 1. Plan to follow up with Dr. Ruth in 2 weeks.     Magnesium level 1.3 on admission, replaced.     Discharge weight: 68 kg    Vitals and labs stable for discharge - WBC 13.8 noted - pt received steroids during hospital stay. No signs of infection, afebrile    After all labs and VS were reviewed the decision was made that the patient was medically stable for discharge.  The patient was discharged in satisfactory condition. She will follow up with her PCP and Dr. Ruth (gen surgery)    More than 30 minutes were spent in coordinating patient discharge.        Pertinent Physical Exam At Time of Discharge  Physical Exam  Constitutional:       General: She is not in acute distress.     Appearance: Normal appearance. She is not ill-appearing or toxic-appearing.   HENT:      Head: Normocephalic and atraumatic.   Cardiovascular:      Rate and Rhythm: Normal rate and regular rhythm.      Pulses:  Normal pulses.      Heart sounds: Normal heart sounds. No murmur heard.     No friction rub. No gallop.   Pulmonary:      Effort: Pulmonary effort is normal.      Breath sounds: Normal breath sounds. No wheezing, rhonchi or rales.   Abdominal:      General: There is no distension.      Palpations: Abdomen is soft. There is no mass.      Tenderness: There is abdominal tenderness (mild tenderness around incisions).   Musculoskeletal:      Right lower leg: No edema.      Left lower leg: No edema.   Skin:     General: Skin is warm and dry.   Neurological:      General: No focal deficit present.      Mental Status: She is alert and oriented to person, place, and time.   Psychiatric:         Mood and Affect: Mood normal.         Behavior: Behavior normal.         Outpatient Follow-Up  Future Appointments   Date Time Provider Department Center   11/1/2024  9:30 AM Zehra Goldstein MD [id number] [facility]   11/12/2024 10:00 AM Jm Moya MD [id number] [facility]   11/22/2024  9:45 AM Zehra Goldstein MD [id number] [facility]   11/25/2024 10:00 AM Aung Blandon MD [id number] [facility]   11/29/2024  9:45 AM Zehra Goldstein MD [id number] [facility]   12/13/2024 10:00 AM GISELA Joseph-CNP [id number] [facility]   2/25/2025 10:00 AM FABIOLA Hernandez, CCC-A [id number] [facility]   3/24/2025 10:45 AM Zehra Goldstein MD [id number] [facility]         Angela Manzanares PA-C

## 2024-10-08 NOTE — PROGRESS NOTES
"Taylor Richard is a 74 y.o. female on day 0 of admission presenting with Symptomatic cholelithiasis.    Subjective   Pt doing well this morning. Has had multiple attempted IV draws without success this morning. Tolerating diet.        Objective     Physical Exam  Constitutional:       Appearance: Normal appearance.   Cardiovascular:      Rate and Rhythm: Normal rate.   Pulmonary:      Effort: Pulmonary effort is normal.   Abdominal:      Palpations: Abdomen is soft.      Comments: Lap sites with Dermabond, C/D/I, edges well approximated, minor bruising without drainage or hematoma. Minimally distended, appropriately tender.      Genitourinary:     Comments: Voiding without difficulty   Musculoskeletal:         General: Normal range of motion.   Skin:     General: Skin is warm and dry.   Neurological:      Mental Status: She is oriented to person, place, and time.   Psychiatric:         Mood and Affect: Mood normal.         Behavior: Behavior normal.         Last Recorded Vitals  Blood pressure 152/63, pulse 75, temperature 37.4 °C (99.3 °F), temperature source Oral, resp. rate 18, height 1.549 m (5' 0.98\"), weight 68 kg (150 lb), SpO2 96%.  Intake/Output last 3 Shifts:  I/O last 3 completed shifts:  In: 50 (0.7 mL/kg) [I.V.:50 (0.7 mL/kg)]  Out: - (0 mL/kg)   Weight: 68 kg     Medications:   Scheduled medications  aspirin, 81 mg, oral, Daily  enoxaparin, 40 mg, subcutaneous, q24h  ezetimibe, 10 mg, oral, Daily  ketorolac, 15 mg, intravenous, q6h  latanoprost, 1 drop, Both Eyes, Nightly  ondansetron, 4 mg, intravenous, Once  pantoprazole, 40 mg, oral, Daily before breakfast  polyethylene glycol, 17 g, oral, BID      Continuous medications     PRN medications  PRN medications: acetaminophen **OR** acetaminophen **OR** [DISCONTINUED] acetaminophen, albuterol, alum-mag hydroxide-simeth, bisacodyl, dextromethorphan-guaifenesin, HYDROmorphone, melatonin, nitroglycerin, ondansetron ODT **OR** ondansetron, " oxyCODONE    Relevant Results  No results found for this or any previous visit (from the past 24 hour(s)).    FL GI cholangiography intraop    Result Date: 10/7/2024  These images are not reportable by radiology and will not be interpreted by  Radiologists.    Electrocardiogram, 12-lead PRN ACS symptoms    Result Date: 10/7/2024  Normal sinus rhythm Nonspecific ST abnormality Abnormal ECG When compared with ECG of 12-DEC-2023 11:47, No significant change was found See ED provider note for full interpretation and clinical correlation Confirmed by Louise Benjamin ([id number]) on 10/7/2024 10:13:40 AM    CT abdomen pelvis w IV contrast    Result Date: 10/6/2024  Interpreted By:  Roberto Mathews, STUDY: CT of the abdomen and pelvis  with contrast dated 10/6/2024.   INDICATION: Abdominal pain and pancreatic ductal dilation.   COMPARISON: None. Correlation is made with same date gallbladder ultrasound.   ACCESSION NUMBER(S): [id number]   ORDERING CLINICIAN: STEFFEN SIMERLINK   TECHNIQUE: Axial CT of the abdomen and pelvis was performed  with intravenous contrast. Sagittal and coronal 2 dimensional reformats were obtained.   FINDINGS: LUNG BASES AND VESSELS:   Atelectasis is seen at the lung bases. No pleural effusion is evident.  The heart is not enlarged. No pericardial effusion is evident.   Calcified atheromatous disease is seen in the visualized arterial tree.   LIVER AND BILIARY TREE:   Is grossly unremarkable. There is intrahepatic biliary dilation most prominent of the central biliary tree. There is prominence of the common bile duct measuring up to 0.9 cm with prominence down to the region near the joining of the pancreatic duct/ampulla of Vater.   GALLBLADDER:   Gallstones are seen in the gallbladder.   SPLEEN:   Is grossly unremarkable.   ADRENAL GLANDS:   Are grossly unremarkable.   PANCREAS:   There is dilation of the pancreatic duct greatest in the head of the pancreas where it measures up to a proximally  0.6 cm. It is prominent down to the level of the joining with the common bile duct. Pancreatic parenchyma is grossly unremarkable as seen on this non pancreas protocol CT examination.   KIDNEYS AND URETERS:   No radio-opaque urinary tract calculus is evident.  No hydroureteronephrosis is evident.   PERITONEUM/RETROPERITONEUM:   No free fluid, free air, or lymphadenopathy is evident in the abdomen or pelvis.   BOWEL:   No bowel obstruction is evident.  The appendix is not definitively identified, but there is no inflammation in its expected anatomic location to suggest appendicitis.  There is diverticulosis coli without CT findings evident to suggest acute diverticulitis.   BLADDER:   Is grossly unremarkable.   REPRODUCTIVE ORGANS:   The uterus is surgically absent. Clips are seen in the pelvis.   ABDOMINAL WALL:   There is a trace fat filled umbilical hernia.   OSSEOUS STRUCTURES:   Degenerative changes seen of the hips SI joints pubic symphysis and spine.       1. Intra and extrahepatic biliary dilation and pancreatic ductal dilation of indeterminate exact etiology as seen on this exam. MRI/MRCP is recommended for further assessment. Laboratory correlation is also recommended. 2. Cholelithiasis. 3. Other findings as above.   Signed by: Roberto Mathews 10/6/2024 2:57 PM Dictation workstation:   [id number]    US gallbladder    Result Date: 10/6/2024  Interpreted By:  Natan Franks, STUDY: US GALLBLADDER; 12:32 pm   INDICATION: Signs/Symptoms:RUQ pain.   COMPARISON: None.   ACCESSION NUMBER(S): [id number]   ORDERING CLINICIAN: STEFFEN SIMERLINK   TECHNIQUE: Limited abdominal ultrasound of the right upper quadrant was performed utilizing gray scale imaging.   FINDINGS: Liver: Normal echogenicity without focal lesion. No intrahepatic biliary distention. Gallbladder: Multiple stones are identified along the dependent portion of the gallbladder. No gallbladder wall thickening is noted. No pericholecystic fluid is  "noted. Sonographic Barker's sign: Negative Pancreas: The pancreatic duct is dilated measuring 6 mm. No mass lesions are identified within the visualized portions of the pancreas. CBD: 0.5 cm Right Kidney:  No pathologic findings are noted.       1. Cholelithiasis. 2.  Dilated appearance of the pancreatic duct measuring 6 mm. The etiology for this is not clear from this study and correlation with a CT of the abdomen with attention pancreas with oral and intravenous contrast is recommended.   MACRO: None.   Signed by: Natan Franks 10/6/2024 12:35 PM Dictation workstation:   [id number]    Corneal Topography - OU - Both Eyes    Result Date: 9/23/2024  Pentacam (9/23/24) - OD: Irregular. 43.5/43.8 @ 169.1. OS: Irregular. 43.9/44.5 @ 163.3.     OCT, Retina - OU - Both Eyes    Result Date: 9/23/2024  OCT macula (9/23/24) - SS: 4/10 OD and 4/10 OS. Normal thickness and contour OU. Intact IS-OS OU. No edema OU. 255/247. Stable from 12/19/22.     OCT, Optic Nerve - OU - Both Eyes    Result Date: 9/23/2024  OCT RNFL (9/23/24) - SS: 4/10 OU. OD: Thin S/I, bord T. OS: Thin S/T. Bord I. 61 OU. Stable from 8/14/23.         Assessment/Plan   Assessment & Plan  Symptomatic cholelithiasis    Taylor Richard is a 75 yo female who presented to the ED with abdominal pain and found to have acute choleystitis. She is now POD #1 s/p lx cholecystectomy with Dr Ruth. Intraoperative findings showed \"Cholangiogram showed some narrowing of the distal duct but contrast did go into the duodenum.  Acutely inflamed intrahepatic gallbladder.\"     On exam, she is well appearing, abdomen is soft, minimally distended, appropriately tender, Lap sites with Dermabond, C/D/I, edges well approximated, minor bruising without drainage or hematoma.    Plan:  POD #1 s/p Lx cholecystectomy   - low fat diet   - oob and walking as much as possible   - IS 10x/hr  - DVT ppx: SCDs and lovenox  - miralax BID for constipation  - antiemetics and pain medications as " needed  - CMP ordered- RN checking if blood can be drawn via US since she is very hard stick.    Dispo: If LFTs are ok, pt can be discharged home and is to follow up with Dr Ruth in 2 weeks. Please reach out with any questions or concerns.     I spent 35 minutes in the professional and overall care of this patient.      Ann Lancaster, GISELA-CNP

## 2024-10-08 NOTE — CARE PLAN
The patient's goals for the shift include      The clinical goals for the shift include Patient will remain HDS throughout entire shift      Problem: Fall/Injury  Goal: Not fall by end of shift  10/8/2024 1347 by Haley Gross RN  Outcome: Progressing  10/8/2024 1347 by Haley Gross RN  Outcome: Progressing  Goal: Be free from injury by end of the shift  10/8/2024 1347 by Haley Gross RN  Outcome: Progressing  10/8/2024 1347 by Haley Gross RN  Outcome: Progressing  Goal: Verbalize understanding of personal risk factors for fall in the hospital  10/8/2024 1347 by Haley Gross RN  Outcome: Progressing  10/8/2024 1347 by Haley Gross RN  Outcome: Progressing  Goal: Verbalize understanding of risk factor reduction measures to prevent injury from fall in the home  10/8/2024 1347 by Haley Gross RN  Outcome: Progressing  10/8/2024 1347 by Haley Gross RN  Outcome: Progressing  Goal: Use assistive devices by end of the shift  10/8/2024 1347 by Haley Gross RN  Outcome: Progressing  10/8/2024 1347 by Haley Gross RN  Outcome: Progressing  Goal: Pace activities to prevent fatigue by end of the shift  10/8/2024 1347 by Haley Gross RN  Outcome: Progressing  10/8/2024 1347 by Haley Gross RN  Outcome: Progressing     Problem: Pain  Goal: Takes deep breaths with improved pain control throughout the shift  Outcome: Progressing  Goal: Turns in bed with improved pain control throughout the shift  Outcome: Progressing  Goal: Walks with improved pain control throughout the shift  Outcome: Progressing  Goal: Performs ADL's with improved pain control throughout shift  Outcome: Progressing  Goal: Participates in PT with improved pain control throughout the shift  Outcome: Progressing  Goal: Free from opioid side effects throughout the shift  Outcome: Progressing  Goal: Free from acute confusion related to pain meds throughout the shift  Outcome: Progressing

## 2024-10-10 LAB
LABORATORY COMMENT REPORT: NORMAL
PATH REPORT.FINAL DX SPEC: NORMAL
PATH REPORT.GROSS SPEC: NORMAL
PATH REPORT.RELEVANT HX SPEC: NORMAL
PATH REPORT.TOTAL CANCER: NORMAL

## 2024-10-17 DIAGNOSIS — J43.2 CENTRILOBULAR EMPHYSEMA (MULTI): ICD-10-CM

## 2024-10-17 DIAGNOSIS — E11.9 TYPE 2 DIABETES MELLITUS WITHOUT COMPLICATION, WITHOUT LONG-TERM CURRENT USE OF INSULIN (MULTI): Primary | ICD-10-CM

## 2024-10-19 DIAGNOSIS — K21.9 GERD WITHOUT ESOPHAGITIS: ICD-10-CM

## 2024-10-21 RX ORDER — OMEPRAZOLE 20 MG/1
CAPSULE, DELAYED RELEASE ORAL
Qty: 100 CAPSULE | Refills: 2 | Status: SHIPPED | OUTPATIENT
Start: 2024-10-21

## 2024-10-23 ENCOUNTER — OFFICE VISIT (OUTPATIENT)
Dept: SURGERY | Facility: CLINIC | Age: 74
End: 2024-10-23
Payer: MEDICARE

## 2024-10-23 VITALS — HEART RATE: 74 BPM | DIASTOLIC BLOOD PRESSURE: 88 MMHG | SYSTOLIC BLOOD PRESSURE: 149 MMHG | TEMPERATURE: 97.7 F

## 2024-10-23 DIAGNOSIS — Z09 SURGERY FOLLOW-UP: Primary | ICD-10-CM

## 2024-10-23 PROCEDURE — 1159F MED LIST DOCD IN RCRD: CPT | Performed by: SURGERY

## 2024-10-23 PROCEDURE — 1036F TOBACCO NON-USER: CPT | Performed by: SURGERY

## 2024-10-23 PROCEDURE — 3060F POS MICROALBUMINURIA REV: CPT | Performed by: SURGERY

## 2024-10-23 PROCEDURE — 3048F LDL-C <100 MG/DL: CPT | Performed by: SURGERY

## 2024-10-23 PROCEDURE — 99211 OFF/OP EST MAY X REQ PHY/QHP: CPT | Performed by: SURGERY

## 2024-10-23 PROCEDURE — 3077F SYST BP >= 140 MM HG: CPT | Performed by: SURGERY

## 2024-10-23 PROCEDURE — 1126F AMNT PAIN NOTED NONE PRSNT: CPT | Performed by: SURGERY

## 2024-10-23 PROCEDURE — 3079F DIAST BP 80-89 MM HG: CPT | Performed by: SURGERY

## 2024-10-23 ASSESSMENT — ENCOUNTER SYMPTOMS: DEPRESSION: 0

## 2024-10-23 ASSESSMENT — PAIN SCALES - GENERAL: PAINLEVEL_OUTOF10: 0-NO PAIN

## 2024-10-23 NOTE — LETTER
October 23, 2024     Maru Daniel, APRN-CNP  [address] [place], [facility] [address]  [facility] [address]    Patient: Taylor Richard   YOB: 1950   Date of Visit: 10/23/2024       Dear Dr. Maru Daniel, APRN-CNP:    Thank you for referring Taylor Richard to me for evaluation. Below are my notes for this consultation.  If you have questions, please do not hesitate to call me. I look forward to following your patient along with you.       Sincerely,     Efra Ruth MD      CC: No Recipients  ______________________________________________________________________________________    Assessment/Plan  Excellent recovery following recent laparoscopic cholecystectomy  -We reviewed intraoperative findings and final pathology report  -Patient encouraged to resume regular activities including exercise as tolerated  -Avoid greasy and fatty foods first couple months after surgery  -Follow-up with me as needed    Subjective  Mr. Richard following up after recent gallbladder surgery.  She is doing well.  Her bowels have been a little sluggish.  No pain.  Just had some questions about dietary adjustments       Objective    Physical Exam  NAD  A&Ox3  Non icteric  CTA  RR  Abdomen soft min tender. Wounds clean, intact  Extremities warm, well perfused         Relevant Results    Collected 10/7/2024 11:21       Status: Final result       Visible to patient: Yes (seen)       Dx: Symptomatic cholelithiasis    0 Result Notes       Component  Resulting Agency   FINAL DIAGNOSIS   A. GALLBLADDER CHOLECYSTECTOMY:   -Gallbladder with cholelithiasis and chronic cholecystitis   Electronically signed by Sunshine Parmar MD PhD on 10/10/2024 at [address] [place] [address]   By the signature on this report, the individual or group listed as making the Final Interpretation/Diagnosis certifies that they have reviewed this case.         No results found. However, due to the size of the patient record, not all encounters were  searched. Please check Results Review for a complete set of results.        I spent 25 minutes in the professional and overall care of this patient.      Efra Ruth MD

## 2024-10-23 NOTE — PROGRESS NOTES
Assessment/Plan   Excellent recovery following recent laparoscopic cholecystectomy  -We reviewed intraoperative findings and final pathology report  -Patient encouraged to resume regular activities including exercise as tolerated  -Avoid greasy and fatty foods first couple months after surgery  -Follow-up with me as needed    Subjective   Mr. Richard following up after recent gallbladder surgery.  She is doing well.  Her bowels have been a little sluggish.  No pain.  Just had some questions about dietary adjustments       Objective     Physical Exam  NAD  A&Ox3  Non icteric  CTA  RR  Abdomen soft min tender. Wounds clean, intact  Extremities warm, well perfused         Relevant Results    Collected 10/7/2024 11:21       Status: Final result       Visible to patient: Yes (seen)       Dx: Symptomatic cholelithiasis    0 Result Notes       Component  Resulting Agency   FINAL DIAGNOSIS   A. GALLBLADDER CHOLECYSTECTOMY:   -Gallbladder with cholelithiasis and chronic cholecystitis   Electronically signed by Sunshine Parmar MD PhD on 10/10/2024 at [address] [place] [address]   By the signature on this report, the individual or group listed as making the Final Interpretation/Diagnosis certifies that they have reviewed this case.         No results found. However, due to the size of the patient record, not all encounters were searched. Please check Results Review for a complete set of results.        I spent 25 minutes in the professional and overall care of this patient.      Efra Ruth MD

## 2024-10-28 ASSESSMENT — CUP TO DISC RATIO
OS_RATIO: 0.25
OD_RATIO: 0.25

## 2024-11-01 ENCOUNTER — APPOINTMENT (OUTPATIENT)
Dept: OPHTHALMOLOGY | Facility: CLINIC | Age: 74
End: 2024-11-01
Payer: MEDICARE

## 2024-11-01 DIAGNOSIS — H52.4 PRESBYOPIA: ICD-10-CM

## 2024-11-01 DIAGNOSIS — H43.811 PVD (POSTERIOR VITREOUS DETACHMENT), RIGHT EYE: ICD-10-CM

## 2024-11-01 DIAGNOSIS — R73.03 PREDIABETES: ICD-10-CM

## 2024-11-01 DIAGNOSIS — H52.13 MYOPIA OF BOTH EYES: ICD-10-CM

## 2024-11-01 DIAGNOSIS — H35.033 HYPERTENSIVE RETINOPATHY OF BOTH EYES: ICD-10-CM

## 2024-11-01 DIAGNOSIS — H52.203 ASTIGMATISM OF BOTH EYES, UNSPECIFIED TYPE: ICD-10-CM

## 2024-11-01 DIAGNOSIS — H01.004 BLEPHARITIS OF UPPER EYELIDS OF BOTH EYES, UNSPECIFIED TYPE: ICD-10-CM

## 2024-11-01 DIAGNOSIS — H25.812 COMBINED FORM OF AGE-RELATED CATARACT, LEFT EYE: Primary | ICD-10-CM

## 2024-11-01 DIAGNOSIS — H02.889 MEIBOMIAN GLAND DYSFUNCTION: ICD-10-CM

## 2024-11-01 DIAGNOSIS — H40.003 GLAUCOMA SUSPECT OF BOTH EYES: ICD-10-CM

## 2024-11-01 DIAGNOSIS — Z96.1 PSEUDOPHAKIA: ICD-10-CM

## 2024-11-01 DIAGNOSIS — H01.001 BLEPHARITIS OF UPPER EYELIDS OF BOTH EYES, UNSPECIFIED TYPE: ICD-10-CM

## 2024-11-01 PROCEDURE — 99214 OFFICE O/P EST MOD 30 MIN: CPT | Performed by: OPHTHALMOLOGY

## 2024-11-01 RX ORDER — TETRACAINE HYDROCHLORIDE 5 MG/ML
1 SOLUTION OPHTHALMIC ONCE
OUTPATIENT
Start: 2024-11-01 | End: 2024-11-01

## 2024-11-01 RX ORDER — PHENYLEPHRINE HYDROCHLORIDE 100 MG/ML
1 SOLUTION/ DROPS OPHTHALMIC
OUTPATIENT
Start: 2024-11-01 | End: 2024-11-01

## 2024-11-01 RX ORDER — CYCLOPENTOLATE HYDROCHLORIDE 10 MG/ML
1 SOLUTION/ DROPS OPHTHALMIC
OUTPATIENT
Start: 2024-11-01 | End: 2024-11-01

## 2024-11-01 ASSESSMENT — REFRACTION_MANIFEST
OD_SPHERE: -2.75
OD_CYLINDER: -0.50
OS_AXIS: 085
OS_SPHERE: -3.00
OS_CYLINDER: -1.75
OD_AXIS: 090
OS_ADD: +2.50
OD_ADD: +2.50

## 2024-11-01 ASSESSMENT — CONF VISUAL FIELD
OD_INFERIOR_NASAL_RESTRICTION: 0
OD_INFERIOR_TEMPORAL_RESTRICTION: 0
OD_SUPERIOR_NASAL_RESTRICTION: 0
OS_SUPERIOR_NASAL_RESTRICTION: 0
OD_NORMAL: 1
OD_SUPERIOR_TEMPORAL_RESTRICTION: 0
OS_NORMAL: 1
OS_INFERIOR_NASAL_RESTRICTION: 0
OS_SUPERIOR_TEMPORAL_RESTRICTION: 0
OS_INFERIOR_TEMPORAL_RESTRICTION: 0

## 2024-11-01 ASSESSMENT — VISUAL ACUITY
OS_CC: 20/40
OD_CC: 20/20
OS_CC+: +2
OD_CC+: -1
CORRECTION_TYPE: GLASSES
METHOD: SNELLEN - LINEAR
OS_BAT_MED: 20/50

## 2024-11-01 ASSESSMENT — REFRACTION_WEARINGRX
OD_CYLINDER: -0.50
OS_CYLINDER: -1.50
SPECS_TYPE: BIFOCAL
OS_ADD: +2.50
OD_SPHERE: -2.50
OD_AXIS: 090
OD_ADD: +2.50
OS_SPHERE: -3.00
OS_AXIS: 085

## 2024-11-01 ASSESSMENT — PACHYMETRY
OD_CT(UM): 510
OS_CT(UM): 520

## 2024-11-01 ASSESSMENT — ENCOUNTER SYMPTOMS: EYES NEGATIVE: 1

## 2024-11-01 ASSESSMENT — TONOMETRY
OS_IOP_MMHG: 16
IOP_METHOD: GOLDMANN APPLANATION
OD_IOP_MMHG: 16

## 2024-11-08 ENCOUNTER — CLINICAL SUPPORT (OUTPATIENT)
Dept: AUDIOLOGY | Facility: CLINIC | Age: 74
End: 2024-11-08
Payer: MEDICARE

## 2024-11-08 DIAGNOSIS — H74.8X2 TYPE A-S TYMPANOGRAM, LEFT: ICD-10-CM

## 2024-11-08 DIAGNOSIS — H90.3 SENSORINEURAL HEARING LOSS (SNHL) OF BOTH EARS: Primary | ICD-10-CM

## 2024-11-08 PROCEDURE — 92557 COMPREHENSIVE HEARING TEST: CPT | Performed by: AUDIOLOGIST

## 2024-11-08 PROCEDURE — 92550 TYMPANOMETRY & REFLEX THRESH: CPT | Performed by: AUDIOLOGIST

## 2024-11-12 ENCOUNTER — APPOINTMENT (OUTPATIENT)
Dept: GASTROENTEROLOGY | Facility: CLINIC | Age: 74
End: 2024-11-12
Payer: MEDICARE

## 2024-11-15 DIAGNOSIS — H25.812 COMBINED FORM OF AGE-RELATED CATARACT, LEFT EYE: Primary | ICD-10-CM

## 2024-11-15 RX ORDER — KETOROLAC TROMETHAMINE 5 MG/ML
SOLUTION OPHTHALMIC
Qty: 5 ML | Refills: 2 | Status: SHIPPED | OUTPATIENT
Start: 2024-11-15

## 2024-11-15 RX ORDER — OFLOXACIN 3 MG/ML
SOLUTION/ DROPS OPHTHALMIC
Qty: 5 ML | Refills: 2 | Status: SHIPPED | OUTPATIENT
Start: 2024-11-15

## 2024-11-15 RX ORDER — PREDNISOLONE ACETATE 10 MG/ML
SUSPENSION/ DROPS OPHTHALMIC
Qty: 5 ML | Refills: 2 | Status: SHIPPED | OUTPATIENT
Start: 2024-11-15

## 2024-11-20 ENCOUNTER — ANESTHESIA EVENT (OUTPATIENT)
Dept: OPERATING ROOM | Facility: CLINIC | Age: 74
End: 2024-11-20
Payer: MEDICARE

## 2024-11-21 ENCOUNTER — ANESTHESIA (OUTPATIENT)
Dept: OPERATING ROOM | Facility: CLINIC | Age: 74
End: 2024-11-21
Payer: MEDICARE

## 2024-11-21 ENCOUNTER — HOSPITAL ENCOUNTER (OUTPATIENT)
Facility: CLINIC | Age: 74
Setting detail: OUTPATIENT SURGERY
Discharge: HOME | End: 2024-11-21
Attending: OPHTHALMOLOGY | Admitting: OPHTHALMOLOGY
Payer: MEDICARE

## 2024-11-21 VITALS
RESPIRATION RATE: 16 BRPM | SYSTOLIC BLOOD PRESSURE: 169 MMHG | HEIGHT: 61 IN | HEART RATE: 67 BPM | DIASTOLIC BLOOD PRESSURE: 75 MMHG | BODY MASS INDEX: 27.68 KG/M2 | TEMPERATURE: 97.3 F | WEIGHT: 146.61 LBS | OXYGEN SATURATION: 97 %

## 2024-11-21 DIAGNOSIS — H25.812 COMBINED FORM OF AGE-RELATED CATARACT, LEFT EYE: Primary | ICD-10-CM

## 2024-11-21 LAB — GLUCOSE BLD MANUAL STRIP-MCNC: 88 MG/DL (ref 74–99)

## 2024-11-21 PROCEDURE — 82947 ASSAY GLUCOSE BLOOD QUANT: CPT

## 2024-11-21 PROCEDURE — 7100000010 HC PHASE TWO TIME - EACH INCREMENTAL 1 MINUTE: Performed by: OPHTHALMOLOGY

## 2024-11-21 PROCEDURE — 3600000003 HC OR TIME - INITIAL BASE CHARGE - PROCEDURE LEVEL THREE: Performed by: OPHTHALMOLOGY

## 2024-11-21 PROCEDURE — 2500000005 HC RX 250 GENERAL PHARMACY W/O HCPCS: Performed by: OPHTHALMOLOGY

## 2024-11-21 PROCEDURE — 7100000009 HC PHASE TWO TIME - INITIAL BASE CHARGE: Performed by: OPHTHALMOLOGY

## 2024-11-21 PROCEDURE — 66982 XCAPSL CTRC RMVL CPLX WO ECP: CPT | Performed by: OPHTHALMOLOGY

## 2024-11-21 PROCEDURE — 3700000002 HC GENERAL ANESTHESIA TIME - EACH INCREMENTAL 1 MINUTE: Performed by: OPHTHALMOLOGY

## 2024-11-21 PROCEDURE — 2500000004 HC RX 250 GENERAL PHARMACY W/ HCPCS (ALT 636 FOR OP/ED): Performed by: NURSE ANESTHETIST, CERTIFIED REGISTERED

## 2024-11-21 PROCEDURE — 2500000004 HC RX 250 GENERAL PHARMACY W/ HCPCS (ALT 636 FOR OP/ED): Mod: JG | Performed by: OPHTHALMOLOGY

## 2024-11-21 PROCEDURE — 2760000001 HC OR 276 NO HCPCS: Performed by: OPHTHALMOLOGY

## 2024-11-21 PROCEDURE — 2500000004 HC RX 250 GENERAL PHARMACY W/ HCPCS (ALT 636 FOR OP/ED): Performed by: OPHTHALMOLOGY

## 2024-11-21 PROCEDURE — 3600000008 HC OR TIME - EACH INCREMENTAL 1 MINUTE - PROCEDURE LEVEL THREE: Performed by: OPHTHALMOLOGY

## 2024-11-21 PROCEDURE — 3700000001 HC GENERAL ANESTHESIA TIME - INITIAL BASE CHARGE: Performed by: OPHTHALMOLOGY

## 2024-11-21 PROCEDURE — V2632 POST CHMBR INTRAOCULAR LENS: HCPCS | Performed by: OPHTHALMOLOGY

## 2024-11-21 PROCEDURE — 2500000001 HC RX 250 WO HCPCS SELF ADMINISTERED DRUGS (ALT 637 FOR MEDICARE OP): Performed by: OPHTHALMOLOGY

## 2024-11-21 DEVICE — CLAREON ASPHERIC HYDROPHOBIC ACRYLIC IOL WITH THE AUTONOMEAUTOMATED PRE-LOADED DELIVERY SYSTEM
Type: IMPLANTABLE DEVICE | Site: EYE | Status: FUNCTIONAL
Brand: CLAREON™

## 2024-11-21 RX ORDER — PHENYLEPHRINE HYDROCHLORIDE 100 MG/ML
1 SOLUTION/ DROPS OPHTHALMIC
Status: COMPLETED | OUTPATIENT
Start: 2024-11-21 | End: 2024-11-21

## 2024-11-21 RX ORDER — CYCLOPENTOLATE HYDROCHLORIDE 10 MG/ML
1 SOLUTION/ DROPS OPHTHALMIC
Status: COMPLETED | OUTPATIENT
Start: 2024-11-21 | End: 2024-11-21

## 2024-11-21 RX ORDER — METOCLOPRAMIDE HYDROCHLORIDE 5 MG/ML
10 INJECTION INTRAMUSCULAR; INTRAVENOUS ONCE AS NEEDED
Status: DISCONTINUED | OUTPATIENT
Start: 2024-11-21 | End: 2024-11-21 | Stop reason: HOSPADM

## 2024-11-21 RX ORDER — WATER 1 ML/ML
IRRIGANT IRRIGATION AS NEEDED
Status: DISCONTINUED | OUTPATIENT
Start: 2024-11-21 | End: 2024-11-21 | Stop reason: HOSPADM

## 2024-11-21 RX ORDER — TETRACAINE HYDROCHLORIDE 5 MG/ML
SOLUTION OPHTHALMIC AS NEEDED
Status: DISCONTINUED | OUTPATIENT
Start: 2024-11-21 | End: 2024-11-21 | Stop reason: HOSPADM

## 2024-11-21 RX ORDER — EPINEPHRINE 1 MG/ML
INJECTION, SOLUTION, CONCENTRATE INTRAVENOUS AS NEEDED
Status: DISCONTINUED | OUTPATIENT
Start: 2024-11-21 | End: 2024-11-21 | Stop reason: HOSPADM

## 2024-11-21 RX ORDER — POVIDONE-IODINE 5 %
SOLUTION, NON-ORAL OPHTHALMIC (EYE) AS NEEDED
Status: DISCONTINUED | OUTPATIENT
Start: 2024-11-21 | End: 2024-11-21 | Stop reason: HOSPADM

## 2024-11-21 RX ORDER — TETRACAINE HYDROCHLORIDE 5 MG/ML
1 SOLUTION OPHTHALMIC ONCE
Status: COMPLETED | OUTPATIENT
Start: 2024-11-21 | End: 2024-11-21

## 2024-11-21 RX ORDER — FENTANYL CITRATE 50 UG/ML
INJECTION, SOLUTION INTRAMUSCULAR; INTRAVENOUS AS NEEDED
Status: DISCONTINUED | OUTPATIENT
Start: 2024-11-21 | End: 2024-11-21

## 2024-11-21 RX ORDER — MIDAZOLAM HYDROCHLORIDE 1 MG/ML
INJECTION, SOLUTION INTRAMUSCULAR; INTRAVENOUS AS NEEDED
Status: DISCONTINUED | OUTPATIENT
Start: 2024-11-21 | End: 2024-11-21

## 2024-11-21 RX ORDER — LIDOCAINE HYDROCHLORIDE 10 MG/ML
0.1 INJECTION, SOLUTION EPIDURAL; INFILTRATION; INTRACAUDAL; PERINEURAL ONCE
Status: DISCONTINUED | OUTPATIENT
Start: 2024-11-21 | End: 2024-11-21 | Stop reason: HOSPADM

## 2024-11-21 RX ORDER — ONDANSETRON HYDROCHLORIDE 2 MG/ML
4 INJECTION, SOLUTION INTRAVENOUS ONCE AS NEEDED
Status: DISCONTINUED | OUTPATIENT
Start: 2024-11-21 | End: 2024-11-21 | Stop reason: HOSPADM

## 2024-11-21 RX ADMIN — CYCLOPENTOLATE HYDROCHLORIDE 1 DROP: 10 SOLUTION/ DROPS OPHTHALMIC at 13:15

## 2024-11-21 RX ADMIN — CYCLOPENTOLATE HYDROCHLORIDE 1 DROP: 10 SOLUTION/ DROPS OPHTHALMIC at 13:25

## 2024-11-21 RX ADMIN — PHENYLEPHRINE HYDROCHLORIDE 1 DROP: 100 SOLUTION/ DROPS OPHTHALMIC at 13:20

## 2024-11-21 RX ADMIN — CYCLOPENTOLATE HYDROCHLORIDE 1 DROP: 10 SOLUTION/ DROPS OPHTHALMIC at 13:20

## 2024-11-21 RX ADMIN — TETRACAINE HYDROCHLORIDE 1 DROP: 5 SOLUTION OPHTHALMIC at 13:15

## 2024-11-21 RX ADMIN — PHENYLEPHRINE HYDROCHLORIDE 1 DROP: 100 SOLUTION/ DROPS OPHTHALMIC at 13:25

## 2024-11-21 RX ADMIN — PHENYLEPHRINE HYDROCHLORIDE 1 DROP: 100 SOLUTION/ DROPS OPHTHALMIC at 13:15

## 2024-11-21 SDOH — HEALTH STABILITY: MENTAL HEALTH: CURRENT SMOKER: 0

## 2024-11-21 ASSESSMENT — PAIN SCALES - GENERAL
PAINLEVEL_OUTOF10: 0 - NO PAIN

## 2024-11-21 ASSESSMENT — PAIN - FUNCTIONAL ASSESSMENT
PAIN_FUNCTIONAL_ASSESSMENT: 0-10

## 2024-11-21 ASSESSMENT — COLUMBIA-SUICIDE SEVERITY RATING SCALE - C-SSRS
6. HAVE YOU EVER DONE ANYTHING, STARTED TO DO ANYTHING, OR PREPARED TO DO ANYTHING TO END YOUR LIFE?: NO
1. IN THE PAST MONTH, HAVE YOU WISHED YOU WERE DEAD OR WISHED YOU COULD GO TO SLEEP AND NOT WAKE UP?: NO
2. HAVE YOU ACTUALLY HAD ANY THOUGHTS OF KILLING YOURSELF?: NO

## 2024-11-21 NOTE — H&P
History Of Present Illness  Taylor Richard is a 74 y.o. female presenting with cataract left eye  .     Past Medical History  She has a past medical history of Acute candidiasis of vulva and vagina (01/06/2017), Age-related nuclear cataract, left eye (05/21/2018), Age-related nuclear cataract, right eye (01/10/2018), Cellulitis of left lower limb (05/27/2021), Cholelithiasis, Chronic viral hepatitis C (Multi) (08/14/2017), Combined forms of age-related cataract, bilateral (12/13/2017), Contusion of right eyelid and periocular area, initial encounter (01/10/2018), Coronary artery disease, Cortical age-related cataract, left eye (05/21/2018), Cortical age-related cataract, right eye (01/10/2018), Edema of larynx (12/30/2016), Emphysema lung (Multi), Encounter for immunization (05/26/2021), GERD (gastroesophageal reflux disease), Hepatitis C, Hypertensive retinopathy, bilateral (12/19/2022), Meibomian gland dysfunction of unspecified eye, unspecified eyelid (12/19/2022), Myopia, bilateral (12/19/2022), Ocular hypertension, unspecified eye (01/15/2016), Ocular pain, right eye (01/10/2018), Other conditions influencing health status (05/21/2018), Other pulmonary collapse (06/28/2016), Other specified noninflammatory disorders of vulva and perineum (12/15/2014), Pain in right shoulder (04/06/2016), Pain in unspecified ankle and joints of unspecified foot (10/27/2015), Personal history of nicotine dependence (01/04/2023), Personal history of other diseases of the digestive system (08/17/2017), Personal history of other diseases of the respiratory system (05/03/2018), Personal history of other infectious and parasitic diseases (09/15/2021), Personal history of other infectious and parasitic diseases, Personal history of other specified conditions (08/14/2017), Personal history of other specified conditions (03/27/2018), Prediabetes (12/19/2022), Preglaucoma, unspecified, bilateral (12/19/2022), Presence of intraocular lens  (02/15/2018), Presence of intraocular lens (12/19/2022), Rheumatoid arthritis, Right upper quadrant pain (10/14/2021), Right upper quadrant pain (10/14/2021), Strain of unspecified muscle, fascia and tendon at shoulder and upper arm level, right arm, initial encounter (04/06/2016), Systemic lupus erythematosus, unspecified (12/19/2022), Systemic lupus erythematosus, unspecified (11/29/2017), Type 2 diabetes mellitus without complications (Multi) (01/04/2023), Type 2 diabetes mellitus without complications (Multi) (12/13/2017), Type 2 diabetes mellitus without complications (Multi) (10/31/2017), Unspecified symptoms and signs involving the genitourinary system (09/15/2021), and Unspecified visual field defects (04/27/2016).    Surgical History  She has a past surgical history that includes Total abdominal hysterectomy w/ bilateral salpingoophorectomy (11/07/2014); Other surgical history (10/14/2021); Other surgical history (10/14/2021); Other surgical history (10/14/2021); Other surgical history (03/17/2022); CT angio coronary art with heartflow if score >30% (02/18/2019); and Cholecystectomy.     Social History  She reports that she has never smoked. She has never used smokeless tobacco. She reports current alcohol use. She reports that she does not use drugs.     Allergies  Acetaminophen, Isosorbide, Pravastatin, Codeine, Losartan, and Penicillins    Current Outpatient Medications   Medication Instructions    albuterol 90 mcg/actuation inhaler 2 puffs, inhalation, Every 4 hours PRN    aspirin 81 mg, Daily    azelastine (Optivar) 0.05 % ophthalmic solution 1 drop both eyes 2 times a day as needed for itching/allergies    ezetimibe (ZETIA) 10 mg, oral, Daily    ferrous gluconate (Fergon) 324 (38 Fe) mg tablet Take 1 tablet by mouth with breakfast 3 times per week    ketorolac (Acular) 0.5 % ophthalmic solution 1 drop to surgical eye 4 times a day starting 1 day before surgery    latanoprost (Xalatan) 0.005 %  ophthalmic solution 1 drop, Both Eyes, Nightly    metFORMIN (Glucophage) 500 mg tablet TAKE 2 TABLETS BY MOUTH IN THE  MORNING AND 1 TABLET IN THE  EVENING    nitroglycerin (NITROSTAT) 0.4 mg, sublingual, Every 5 min PRN, Max 3 tablets in 15 minutes. Call 911 if pain persists    ofloxacin (Ocuflox) 0.3 % ophthalmic solution 1 drop to operative eye 4 times a day starting 1 day before surgery    omeprazole (PriLOSEC) 20 mg DR capsule TAKE 1 CAPSULE BY MOUTH ONCE  DAILY IN THE MORNING TAKE BEFORE A MEAL. DONT CRUSH OR CHEW    OneTouch Ultra Test strip Use to check blood sugar 3 times daily.    prednisoLONE acetate (Pred-Forte) 1 % ophthalmic suspension 1 drop to operative eye 4 times a day starting the day of surgery (after surgery is done)       ROS  Patient denies ocular pain, redness, discharge, decreased vision, double vision, blind spots, flashes, or floaters.     Physical Exam  Not recorded     Constitutional: No acute distress   HEENT: mucus membranes moist, atraumatic   Respiratory: no respiratory distress   Cardiovascular: no peripheral edema  Abdomen: non distended   MSK/neuro: moves all extremities spontaneously   Skin: no rashes   Psych: alert and oriented        Assessment/Plan   Assessment & Plan  Combined form of age-related cataract, left eye      Plan cataract surgery left eye             Yissel Bliss MD

## 2024-11-21 NOTE — ANESTHESIA PREPROCEDURE EVALUATION
Patient: Taylor Richard    Procedure Information       Date/Time: 11/21/24 1410    Procedure: Phacoemulsification Cataract with Insertion Intraocular Lens (Left: Eye)    Location: [facility] 03 / [facility]    Surgeons: Zehra Goldstein MD            Relevant Problems   Anesthesia (within normal limits)      Cardiac   (+) Arteriosclerosis of coronary artery   (+) Benign essential hypertension   (+) HLD (hyperlipidemia)      Pulmonary   (+) Centrilobular emphysema (Multi)      GI   (+) GERD (gastroesophageal reflux disease)      Liver   (+) Chronic liver disease      Hematology   (+) Anemia   (+) Antiphospholipid syndrome (Multi)      Musculoskeletal   (+) Degenerative arthritis of lumbar spine   (+) Osteoarthritis   (+) Rheumatoid arthritis involving multiple sites with positive rheumatoid factor (Multi)      HEENT   (+) Hearing loss sensory, bilateral   (+) POAG (primary open-angle glaucoma)      Eye   (+) Combined form of age-related cataract, left eye       Clinical information reviewed:   Tobacco  Allergies  Meds   Med Hx  Surg Hx  OB Status  Fam Hx  Soc   Hx        NPO Detail:  No data recorded     Physical Exam    Airway  Mallampati: III  TM distance: >3 FB  Neck ROM: full     Cardiovascular   Rhythm: regular  Rate: normal     Dental    Pulmonary   Breath sounds clear to auscultation     Abdominal            Anesthesia Plan    History of general anesthesia?: yes  History of complications of general anesthesia?: no    ASA 3     MAC     The patient is not a current smoker.    intravenous induction   Anesthetic plan and risks discussed with patient.  Use of blood products discussed with patient who consented to blood products.    Plan discussed with CRNA.

## 2024-11-21 NOTE — DISCHARGE INSTRUCTIONS
Surgeon: Zehra Goldstein MD     Patient name: Taylor Richard    Date of surgery: November 21, 2024        DROP INSTRUCTIONS:    Prednisolone acetate 1% (pink or white cap) - One drop 4 times a day to operative eye    Ofloxacin (tan cap) - One drop 4 times a day to operative eye    Ketorolac (gray cap) - One drop 4 times a day to operative eye    When putting different drops in around the same administration time, please wait 1-2 minutes between drops  Avoid sleeping on side of operative eye  No heavy lifting over 10-15lbs and no bending over  Do not get eye wet, no eye rubbing  Wear sunglasses or glasses during the day and the shield when sleeping at night  Please call immediately if you develop any redness, pain, decreased vision, flashes, or floaters      During office hours (8:30a-4:30p): [phone]  After office hours: [phone]  [facility] : [phone]   Pager: [phone] for Dr. Kaur

## 2024-11-22 ENCOUNTER — APPOINTMENT (OUTPATIENT)
Dept: OPHTHALMOLOGY | Facility: CLINIC | Age: 74
End: 2024-11-22
Payer: MEDICARE

## 2024-11-22 DIAGNOSIS — H35.033 HYPERTENSIVE RETINOPATHY OF BOTH EYES: ICD-10-CM

## 2024-11-22 DIAGNOSIS — H40.003 GLAUCOMA SUSPECT OF BOTH EYES: ICD-10-CM

## 2024-11-22 DIAGNOSIS — H01.004 BLEPHARITIS OF UPPER EYELIDS OF BOTH EYES, UNSPECIFIED TYPE: ICD-10-CM

## 2024-11-22 DIAGNOSIS — Z96.1 PSEUDOPHAKIA: Primary | ICD-10-CM

## 2024-11-22 DIAGNOSIS — H52.203 ASTIGMATISM OF BOTH EYES, UNSPECIFIED TYPE: ICD-10-CM

## 2024-11-22 DIAGNOSIS — H02.889 MEIBOMIAN GLAND DYSFUNCTION: ICD-10-CM

## 2024-11-22 DIAGNOSIS — H52.13 MYOPIA OF BOTH EYES: ICD-10-CM

## 2024-11-22 DIAGNOSIS — H01.001 BLEPHARITIS OF UPPER EYELIDS OF BOTH EYES, UNSPECIFIED TYPE: ICD-10-CM

## 2024-11-22 DIAGNOSIS — H43.811 PVD (POSTERIOR VITREOUS DETACHMENT), RIGHT EYE: ICD-10-CM

## 2024-11-22 DIAGNOSIS — H52.4 PRESBYOPIA: ICD-10-CM

## 2024-11-22 DIAGNOSIS — R73.03 PREDIABETES: ICD-10-CM

## 2024-11-22 PROCEDURE — 99024 POSTOP FOLLOW-UP VISIT: CPT | Performed by: OPHTHALMOLOGY

## 2024-11-22 ASSESSMENT — ENCOUNTER SYMPTOMS
ENDOCRINE NEGATIVE: 0
MUSCULOSKELETAL NEGATIVE: 0
EYES NEGATIVE: 1
GASTROINTESTINAL NEGATIVE: 0
CARDIOVASCULAR NEGATIVE: 0
NEUROLOGICAL NEGATIVE: 0
CONSTITUTIONAL NEGATIVE: 0
ALLERGIC/IMMUNOLOGIC NEGATIVE: 0
HEMATOLOGIC/LYMPHATIC NEGATIVE: 0
RESPIRATORY NEGATIVE: 0
PSYCHIATRIC NEGATIVE: 0

## 2024-11-22 ASSESSMENT — PACHYMETRY
OS_CT(UM): 520
OD_CT(UM): 510

## 2024-11-22 ASSESSMENT — VISUAL ACUITY
METHOD: SNELLEN - LINEAR
OS_PH_SC: 20/30
OS_SC: 20/100

## 2024-11-22 ASSESSMENT — TONOMETRY
IOP_METHOD: GOLDMANN APPLANATION
OS_IOP_MMHG: 18

## 2024-11-22 ASSESSMENT — PAIN SCALES - GENERAL: PAINLEVEL_OUTOF10: 0 - NO PAIN

## 2024-11-22 NOTE — OP NOTE
Phacoemulsification Cataract with Insertion Intraocular Lens (L) Operative Note     Date: 2024  OR Location: [facility]    Name: Taylor Richard, : 1950, Age: 74 y.o., MRN: [id number], Sex: female    Diagnosis  Pre-op Diagnosis      * Combined form of age-related cataract, left eye [H25.812] Post-op Diagnosis     * Combined form of age-related cataract, left eye [H25.812]     Procedures  Phacoemulsification Cataract with Insertion Intraocular Lens  15919 - MD XCAPSL CTRC RMVL INSJ IO LENS PROSTH CPLX WO ECP      Surgeons      * Zehra Goldstein - Primary    Resident/Fellow/Other Assistant:  Surgeons and Role:  * No surgeons found with a matching role *    Staff:   Circulator: Flash Coyne Person: Autumn    Anesthesia Staff: Anesthesiologist: Consuelo Rapp MD  CRNA: GISELA Combs-CRNA    Procedure Summary  Anesthesia: Monitor Anesthesia Care  ASA: III  Estimated Blood Loss: 0 mL  Intra-op Medications:   Administrations occurring from 1410 to 1500 on 24:   Medication Name Total Dose   balanced salts (BSS) intraocular solution 515 mL   sterile water irrigation solution 100 mL   povidone-iodine 5 % ophthalmic solution 1 Application   tetracaine (PF) 0.5 % ophthalmic solution 1 drop   EPINEPHrine HCl (PF) (Adrenalin) injection 0.3 mg   chondroitin sulf-sod hyaluron (Duovisc) intraocular kit 1 mL   EPINEPHrine (Adrenalin) 1 mL, lidocaine PF (Xylocaine) 10 mg/mL (1 %) 1 mL in balanced salts (BSS) 3 mL syringe 1 mL              Anesthesia Record               Intraprocedure I/O Totals          Intake    NaCl 0.9 % bolus 10.00 mL    Total Intake 10 mL          Specimen: No specimens collected              Drains and/or Catheters: * None in log *    Tourniquet Times:         Implants:  Implants       Type Name Action Serial No.       6.0MM X 13MM CLAREON 1-PIECE ASPHERIC MONOFOCAL INTRAOCULAR LENS, 19.5 DIOPTER, W/AUTONOME AUTOMATED PRE-LOADED DELIVERY SYSTEM, HYDROPHOBIC ACRYLIC Implanted  [id number]              Findings: as below    Indications: Taylor Richard is an 74 y.o. female who is having surgery for Combined form of age-related cataract, left eye [H25.812].     Procedure Details:     Patient name: Taylor Richard  YOB: 1950   MRN: [id number]   Date of surgery: November 21, 2024   Preoperative diagnosis: Combined cataract of the LEFT eye  Postoperative diagnosis: Combined cataract of the LEFT eye  Procedure: Complex phacoemulsification of cataract with insertion of intraocular lens, LEFT eye with use of Trypan Blue  Surgeon: Zehra Goldstein MD  Resident: Yissel Bliss MD  Anesthesia: MAC  Complications: None  Lens Inserted: Guillermo CNA0T0 with a power of +19.50 D. Serial #: [id number]. Exp date: 05/28/2027.    Procedure description: After the risks, benefits, and alternatives of the planned procedure were discussed with the patient, informed consent was obtained at the preoperative evaluation. On the day of surgery, there were no updates to the consent form and any patient questions were answered. The patient was correctly identified in the preoperative area and the LEFT eye was marked as the operative eye. Dilating drops were instilled into the LEFT eye in the preoperative area. The patient was then taken back to the operating room and placed under sedation. The patient was prepped and draped in the standard, sterile ophthalmic fashion in preparation for intraocular surgery.    A lid speculum was placed into the LEFT palpebral fissure and the operating microscope was brought into position. Due to the dense nature of the cataract, a poor red reflex was observed. A paracentesis was made in inferotemporal cornea at the limbus with a 1.0mm side port blade using a cotton tipped applicator to provide countertraction. Intracameral epishugarcaine was injected into the anterior chamber. Trypan blue was irrigated into the anterior chamber to stain the anterior capsule. BSS was used to  irrigate out the excess Trypan Blue. Viscoat viscoelastic was then injected into the anterior chamber. Using 0.12 forceps to stabilize the globe, a 2.4mm keratome blade was used to create a limbal clear-corneal incision superotemporally. A bent-needle cystotome and Utrata forceps were used to create a continuous curvilinear capsulorrhexis. Balanced salt saline solution on a blunt-tipped cannula was used to achieve hydrodissection.    A phacoemulsification device and a Darian spatula were used to remove the nucleus using a divide-and-conquer technique. Residual cortical material was removed with the irrigation and aspiration handpiece. Provisc viscoelastic was then injected into the eye to reform the anterior chamber and to open the capsular bag. The posterior capsule was inspected and found to be clean and intact. The intraocular lens, Guillermo CNA0T0 with a power of +19.50 D was injected into the capsular bag. A lens positioner was used to center the lens and ensure good position within the bag. The remaining viscoelastic was removed using irrigation and aspiration. Balanced salt saline solution on a blunt-tipped cannula was then used to hydrate the corneal stroma adjacent to the main wound and paracentesis site as well as to reform the anterior chamber. The wound was checked and found to be watertight with normal intraocular pressure verified using digital palpation. At the conclusion of the case, a well-centered intraocular lens with a good red reflex was observed. Tetracaine, betadine, and BSS were instilled into the LEFT eye. The lid speculum and drapes were removed. A clear plastic shield was then taped over the eye. The patient was taken to the recovery room in stable condition, having tolerated the procedure well. There were no complications.      Complications:  None; patient tolerated the procedure well.    Disposition: PACU - hemodynamically stable.  Condition: stable                 Additional Details:  none    Attending Attestation: I performed the procedure.    Zehra Goldstein  Phone Number: [phone]

## 2024-11-22 NOTE — PROGRESS NOTES
Pseudophakia of left eyeZ96.1 - 11/21/24 - Complex with Trypan Blue  -Doing well. Good vision. IOP good.  Prednisolone acetate 1% - 4 times a day  Ofloxacin - 4 times a day  Ketorolac - 4 times a day  Artificial tears PRN  No heavy lifting over 10-15 lbs, no bending over, do not get eye wet, no eye rubbing. Wear eye shield at bedtime and sunglasses/glasses during the day. Advised to call if any redness, pain, decreased vision, flashes, floaters. F/u 1 week - refract both eyes (autorefract).     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 OD: Thin S/I, bord T. OS: Thin S/T. Bord I. 61 OU. Stable from 8/14/23.  -HVF 24-2 (2/12/24) - OD: 13/14FL 3%FP. Scattered nasal, but WNL. OS: 5/16FL 1%FN. Inferonasal depression. Similar to 5/21/18.   -Patient had stopped latanoprost 9/2022 due to foreign body sensation, burning, irritation. Had been taking latanoprost OU since 2016 and not experienced symptoms before. Recommended changing to timolol 0.5% OU qAM, but patient declined to take due to potential side effects (cardiac). Restarted latanoprost OU QHS 12/2022.  -Plan: Discussed importance of compliance with drops and office visits due to risk of permanent vision loss with glaucoma.  Patient using latanoprost OU nightly without issue - reports good compliance.  F/u 6 months - refraction, glare/BAT OS, dilation OU, OCT RNFL (post dilation).    Meibomian gland dysfunction (MGD)H02.89  Blepharitis of both eyesH01.003  -FBS, irritation, redness, itching OS>OD  -History of eyelids and eyebrows itchy like there are mites in them - likely some dryness and blepharitis -use erythromycin ophthalmic ointment.  -Continue warm compresses 1-2x/day  -Baby shampoo lid wash daily or iVizia wipes (samples given) - feels that these don't help  -Using artificial tears once a day, advised to increase to 3-4x/day  -Will Rx: Azelastine OU  BID prn itching/allergies. Eyes are itchy mainly in the fall - possible allergy component.   -Can consider Xdemvy as needed, or Restasis, Cequa, Vevye, Xiidra, Miebo, Tyrvaya for keratoconjunctivitis sicca. Verkazia for vernal conjunctivitis. Punctal plugs.     PVD (posterior vitreous detachment), right eyeH43.811  -Stable, continue to monitor.     Pseudophakia of right eyeZ96.1 - 12/7/17  -Doing well. Good vision. IOP good.     Pre-jzzgaulnJ82.03  Hypertensive retinopathy of both eyesH35.033  -OCT macula (9/23/24) - SS: 4/10 OD and 4/10 OS. Normal thickness and contour OU. Intact IS-OS OU. No edema OU. 255/247. Stable from 12/19/22.   -HbA1c= 6.8 (7/24/24 POCT). On Metformin. No diabetic retinopathy seen. Continue close monitoring of blood glucose, blood pressure, and cholesterol. Plan for annual dilated eye exam. Advised smoking cessation.     Myopia of both eyes with astigmatism and qnutsjrcifU35.13  -F/u 1 week - refract both eyes (autorefract).       No history of refractive surgery.

## 2024-11-22 NOTE — PATIENT INSTRUCTIONS
Surgeon: Zehra Goldstein MD      Patient name: Taylor Richard    Date of visit: November 22, 2024      left eye    Prednisolone acetate (pink or white cap) - 4 times a day    Ofloxacin (tan cap) - 4 times a day    Ketorolac (gray cap) - 4 times a day      No heavy lifting over 10-15 lbs, no bending over at the waist for 1 week, do not get eye wet, no eye rubbing. You may take a shower and wash your face - please keep the operative eye closed to minimize water exposure. Wear eye shield at bedtime and sunglasses/glasses during the day. Please call immediately if you develop any redness, pain, decreased vision, flashes, floaters.       Surgical Scheduler (during office hours): Norah: [phone]  Office phone (after hours): [phone]-[facility] : [phone]                     Pager: [phone] for Dr. Kaur

## 2024-11-25 ENCOUNTER — OFFICE VISIT (OUTPATIENT)
Dept: CARDIOLOGY | Facility: CLINIC | Age: 74
End: 2024-11-25
Payer: MEDICARE

## 2024-11-25 VITALS
HEART RATE: 69 BPM | SYSTOLIC BLOOD PRESSURE: 138 MMHG | DIASTOLIC BLOOD PRESSURE: 70 MMHG | BODY MASS INDEX: 28.21 KG/M2 | HEIGHT: 61 IN | WEIGHT: 149.44 LBS | OXYGEN SATURATION: 97 %

## 2024-11-25 DIAGNOSIS — R07.9 CHEST PAIN, UNSPECIFIED TYPE: Primary | ICD-10-CM

## 2024-11-25 DIAGNOSIS — E78.5 HYPERLIPIDEMIA, UNSPECIFIED HYPERLIPIDEMIA TYPE: ICD-10-CM

## 2024-11-25 PROCEDURE — 3008F BODY MASS INDEX DOCD: CPT | Performed by: INTERNAL MEDICINE

## 2024-11-25 PROCEDURE — 3075F SYST BP GE 130 - 139MM HG: CPT | Performed by: INTERNAL MEDICINE

## 2024-11-25 PROCEDURE — 3060F POS MICROALBUMINURIA REV: CPT | Performed by: INTERNAL MEDICINE

## 2024-11-25 PROCEDURE — 3048F LDL-C <100 MG/DL: CPT | Performed by: INTERNAL MEDICINE

## 2024-11-25 PROCEDURE — 99214 OFFICE O/P EST MOD 30 MIN: CPT | Performed by: INTERNAL MEDICINE

## 2024-11-25 PROCEDURE — 3078F DIAST BP <80 MM HG: CPT | Performed by: INTERNAL MEDICINE

## 2024-11-25 PROCEDURE — 1126F AMNT PAIN NOTED NONE PRSNT: CPT | Performed by: INTERNAL MEDICINE

## 2024-11-25 PROCEDURE — 1159F MED LIST DOCD IN RCRD: CPT | Performed by: INTERNAL MEDICINE

## 2024-11-25 RX ORDER — EZETIMIBE 10 MG/1
10 TABLET ORAL DAILY
Qty: 100 TABLET | Refills: 3 | Status: SHIPPED | OUTPATIENT
Start: 2024-11-25 | End: 2025-11-25

## 2024-11-25 RX ORDER — EZETIMIBE 10 MG/1
10 TABLET ORAL DAILY
Qty: 100 TABLET | Refills: 3 | Status: SHIPPED | OUTPATIENT
Start: 2024-11-25 | End: 2024-11-25 | Stop reason: SDUPTHER

## 2024-11-25 ASSESSMENT — ENCOUNTER SYMPTOMS
DEPRESSION: 0
HEMOPTYSIS: 0
FALLS: 0
MYALGIAS: 0
ALTERED MENTAL STATUS: 0
WHEEZING: 0
COUGH: 0
DYSURIA: 0
HEADACHES: 0
OCCASIONAL FEELINGS OF UNSTEADINESS: 0
DIARRHEA: 0
BLOATING: 0
MEMORY LOSS: 0
FEVER: 0
CHILLS: 0
ABDOMINAL PAIN: 0
NAUSEA: 0
CONSTIPATION: 0
LOSS OF SENSATION IN FEET: 0
HEMATURIA: 0
VOMITING: 0

## 2024-11-25 ASSESSMENT — PAIN SCALES - GENERAL: PAINLEVEL_OUTOF10: 0-NO PAIN

## 2024-11-25 NOTE — PROGRESS NOTES
Chief Complaint   Patient presents with    Coronary Artery Disease       HPI  73 yo BF w/ h/o CAD s/p OM stent 2/22, athero of Ao, HTN, DM, GERD, TOB (quit) now on here for cardiology f/u. +long h/o mid CP at rest that resolves with SLTNG in ~5 minutes, no radiation, occ mild assoc dyspnea.  No dyspnea at rest. No HAHN. No orthopnea/PND. No palps. No further LH/dizziness (off Quinapril). No syncope. No edema. No claudication. +occ cough.  3/18/22 ED for CP at rest x hours (no radiation, no assoc symptoms); no change w/ SLTNG; gone with Toradol -> ECG/TPN neg.   10/22 ED for CP -> LUE at rest x 2 hrs -> ECG/TPN neg  ECG 4/20: SR (66), nonsp ST changes  ECG 7/21: SR (95), minor nonsp ST changes  ECG 2/22: SR (74), minor nonsp ST-T changes  ECG 3/22: SR (73), normal  ECG 10/22: SR (71)  ECG 11/22: SR (61)  ECG 12/23: SR, minor nonsp ST-T changes  ECG 10/24: SR (670, nonsp ST-T changes  Echo 4/20: EF 55%, DD, valves ok, PASP 26  WINSOME 5/18: no ischemia, EF 55-60% -> 65-70%  Nuc 4/20: no ischemia/scar, EF >65%  WINSOME 11/22: no ischemia, EF 60-65% -> 75%  WINSOME 12/23: no ischemia, EF 60% -> 70%, mild chest pressure  CTA cors 2/19: LM plaque, pLAD 50% (FFR 0.8), m-dLAD 25%, D1 25%, mLCx 50-70% (FFR neg), OM1 50%, mRCA 50-70% (FFR 0.79), mild-mod athero of Ao  Cath 2/19: LM ok, p-mLAD 10-30%, mLAD 30%, mLCx 50%, mRCA 60%, dRCA 40%, LVEDP 23, no AS  Cath 2/22: LM ok, p-mLAD 40%, p-mOM1 95% (Synergy NISHA), p-mOM2 60%, m-dRCA 60%, dRCA 60%, EF 60%, no AS, no MR  CXR 4/20: no acute abnl  CXR 1/22: no acute abnl  PFT 5/19: no obst, no rest, mild red DLCO  CT chest 1/21: mod-sev 3v cor calc, no chamb enlarge, no peric eff, no aneurysm, mild athero of Ao, mean PA 3.2cm  CT chest 1/22: sev cor calc, nl heart size, no peric eff, athero of Ao, no aneurysm, nl PA  CT chest 1/24: sev CAC, nl heart size, no peric eff, mild AA, no aneurysm, nl PA  CT ab 10/24: AA, nl heart size, no peric eff  US Ao 7/19: no AAA  SHAWANDA 2/19: R PT 1.09 DP 0.97, L PT  "1.14 DP 0.94     Review of Systems   Constitutional: Negative for chills, fever and malaise/fatigue.   HENT:  Negative for hearing loss.    Eyes:  Negative for visual disturbance.   Respiratory:  Negative for cough, hemoptysis and wheezing.    Skin:  Negative for rash.   Musculoskeletal:  Negative for falls and myalgias.   Gastrointestinal:  Negative for bloating, abdominal pain, constipation, diarrhea, dysphagia, nausea and vomiting.   Genitourinary:  Negative for dysuria and hematuria.   Neurological:  Negative for headaches.   Psychiatric/Behavioral:  Negative for altered mental status, depression and memory loss.       Social History     Tobacco Use    Smoking status: Never    Smokeless tobacco: Never    Tobacco comments:     No history of anesthesia reactions. No family history of MH.     Had cholecystectomy on 10/07/24 and is recovered.     Does not get SOB with a flight of stairs. HARD STICK!     Does not use a cane, walker, or wheelchair.     Is able to lie flat for 30 minutes.     Substance Use Topics    Alcohol use: Yes     Comment: once every other day: wine      Family History   Problem Relation Name Age of Onset    COPD Mother      Colon cancer Mother      Prostate cancer Father      Multiple myeloma Sister      Hodgkin's lymphoma Brother      Breast cancer Daughter        Allergies   Allergen Reactions    Acetaminophen Other     \"Didn't sit right with me\"    Isosorbide Hives    Pravastatin Nausea Only and Unknown    Codeine Unknown and Rash    Losartan Rash    Penicillins Unknown and Rash      Current Outpatient Medications   Medication Instructions    albuterol 90 mcg/actuation inhaler 2 puffs, inhalation, Every 4 hours PRN    aspirin 81 mg, Daily    azelastine (Optivar) 0.05 % ophthalmic solution 1 drop both eyes 2 times a day as needed for itching/allergies    ezetimibe (ZETIA) 10 mg, oral, Daily    ferrous gluconate (Fergon) 324 (38 Fe) mg tablet Take 1 tablet by mouth with breakfast 3 times per " week    ketorolac (Acular) 0.5 % ophthalmic solution 1 drop to surgical eye 4 times a day starting 1 day before surgery    latanoprost (Xalatan) 0.005 % ophthalmic solution 1 drop, Both Eyes, Nightly    metFORMIN (Glucophage) 500 mg tablet TAKE 2 TABLETS BY MOUTH IN THE  MORNING AND 1 TABLET IN THE  EVENING    nitroglycerin (NITROSTAT) 0.4 mg, sublingual, Every 5 min PRN, Max 3 tablets in 15 minutes. Call 911 if pain persists    ofloxacin (Ocuflox) 0.3 % ophthalmic solution 1 drop to operative eye 4 times a day starting 1 day before surgery    omeprazole (PriLOSEC) 20 mg DR capsule TAKE 1 CAPSULE BY MOUTH ONCE  DAILY IN THE MORNING TAKE BEFORE A MEAL. DONT CRUSH OR CHEW    OneTouch Ultra Test strip Use to check blood sugar 3 times daily.    prednisoLONE acetate (Pred-Forte) 1 % ophthalmic suspension 1 drop to operative eye 4 times a day starting the day of surgery (after surgery is done)      Vitals:    11/25/24 0959   BP: 138/70   Pulse:    SpO2:       Physical Exam  Constitutional:       Appearance: Normal appearance.   HENT:      Head: Normocephalic and atraumatic.      Nose: Nose normal.   Neck:      Vascular: No carotid bruit.   Cardiovascular:      Rate and Rhythm: Normal rate and regular rhythm.      Heart sounds: No murmur heard.  Pulmonary:      Effort: Pulmonary effort is normal.      Breath sounds: Normal breath sounds.   Abdominal:      Palpations: Abdomen is soft.      Tenderness: There is no abdominal tenderness.   Musculoskeletal:      Right lower leg: No edema.      Left lower leg: No edema.   Skin:     General: Skin is warm and dry.   Neurological:      General: No focal deficit present.      Mental Status: She is alert.   Psychiatric:         Mood and Affect: Mood normal.         Judgment: Judgment normal.        Results/Data  10/24 Cr 1.03, K 3.9, Mg 1.8, LFT nl, HGB 9.8, , HSTPN 8  7/24 LDL 89, HDL 42, , Chol 155  3/24 TSH 1.06  10/23 Cr 0.89, K 4.3  5/23 Cr 0.77, K 4.4, LDL 86, HDL  44, , Chol 153  10/22 Cr 0.94, K 4.4, LFT nl, HGB 11.1, , hgba1c 6.0, TPN neg  8/22 Cr 0.79, K 4.6, LFT nl, LDL 67, HDL 43, , Chol 148, HGB 11.2,   6/22 hgba1c 5.8  3/22 Cr 0.9, K 6.4 (hemolyzed), LFT nl, HGB 9.7, , TPN neg, BNP 29  1/22 Cr 0.94, K 4.5, HGB 10.6,   12/21 Cr 0.95, K 4.8, LFT nl, , HDL 40, TG 96, Chol 163, HGB 10.4, , hgba1c 5.6  5/21 Cr 0.96, K 4.6, LFT nl, , HDL 43, , Chol 174, hgba1c 6.0  10/20 Cr 1.01, K 4.5, LFT nl, hgba1c 5.8, TSH 2.05  4/20 LDL 84, HDL 40, , Chol 144, HGB 10.6,      Assessment/Plan   75 yo BF w/ h/o CAD s/p OM stent 2/22, athero of Ao, HTN, DM, GERD, TOB (quit) now w/ long h/o rare CP of unclear etiology. WINSOME 12/23 normal. If increases, can repeat WINSOME.  She cannot tolerate statins (constipation/nausea), Imdur (hives), nor BB (fatigue/hair loss).  -continue ASA 81 qd (with food)  -continue Zetia 10 qd (constipation/nausea on Atorva 10; she claims cannot tolerate any statin and will not retry; may need PCSK [she is reluctant]; Cholestyramine was too expensive) -> goal LDL <70, TG <150  -she is a very difficult stick for blood draws  -f/u 9 months (earlier if needed)     Aung Blandon MD

## 2024-11-29 ENCOUNTER — APPOINTMENT (OUTPATIENT)
Dept: OPHTHALMOLOGY | Facility: CLINIC | Age: 74
End: 2024-11-29
Payer: MEDICARE

## 2024-11-29 DIAGNOSIS — H01.001 BLEPHARITIS OF UPPER EYELIDS OF BOTH EYES, UNSPECIFIED TYPE: ICD-10-CM

## 2024-11-29 DIAGNOSIS — H02.889 MEIBOMIAN GLAND DYSFUNCTION: ICD-10-CM

## 2024-11-29 DIAGNOSIS — H35.033 HYPERTENSIVE RETINOPATHY OF BOTH EYES: ICD-10-CM

## 2024-11-29 DIAGNOSIS — H52.4 PRESBYOPIA: ICD-10-CM

## 2024-11-29 DIAGNOSIS — H43.811 PVD (POSTERIOR VITREOUS DETACHMENT), RIGHT EYE: ICD-10-CM

## 2024-11-29 DIAGNOSIS — R73.03 PREDIABETES: ICD-10-CM

## 2024-11-29 DIAGNOSIS — Z96.1 PSEUDOPHAKIA: Primary | ICD-10-CM

## 2024-11-29 DIAGNOSIS — H52.203 ASTIGMATISM OF BOTH EYES, UNSPECIFIED TYPE: ICD-10-CM

## 2024-11-29 DIAGNOSIS — H52.13 MYOPIA OF BOTH EYES: ICD-10-CM

## 2024-11-29 DIAGNOSIS — H01.004 BLEPHARITIS OF UPPER EYELIDS OF BOTH EYES, UNSPECIFIED TYPE: ICD-10-CM

## 2024-11-29 DIAGNOSIS — H40.003 GLAUCOMA SUSPECT OF BOTH EYES: ICD-10-CM

## 2024-11-29 PROCEDURE — 99024 POSTOP FOLLOW-UP VISIT: CPT | Performed by: OPHTHALMOLOGY

## 2024-11-29 ASSESSMENT — REFRACTION_MANIFEST
OD_ADD: +2.50
OS_SPHERE: -2.50
OS_AXIS: 081
OD_AXIS: 077
OS_ADD: +2.50
OS_SPHERE: -2.75
OD_CYLINDER: -0.75
OS_CYLINDER: -0.50
METHOD_AUTOREFRACTION: 1
OD_SPHERE: -2.00
OS_AXIS: 081
OS_CYLINDER: -0.50
OD_SPHERE: -2.25
OD_AXIS: 076
OD_CYLINDER: -0.75

## 2024-11-29 ASSESSMENT — TONOMETRY
IOP_METHOD: GOLDMANN APPLANATION
OS_IOP_MMHG: 16
OD_IOP_MMHG: 16

## 2024-11-29 ASSESSMENT — REFRACTION_WEARINGRX
OD_AXIS: 090
OS_AXIS: 085
OS_SPHERE: -3.00
OS_ADD: +2.50
OS_CYLINDER: -1.50
OD_SPHERE: -2.25
OD_ADD: +2.50
OD_CYLINDER: -0.50

## 2024-11-29 ASSESSMENT — VISUAL ACUITY
OS_CC: 20/40
OS_PH_CC: 20/25
METHOD: SNELLEN - LINEAR
OD_CC: 20/20
CORRECTION_TYPE: GLASSES

## 2024-11-29 ASSESSMENT — PACHYMETRY
OS_CT(UM): 520
OD_CT(UM): 510

## 2024-11-29 NOTE — PROGRESS NOTES
Pseudophakia of left eyeZ96.1 - 11/21/24 - Complex with Trypan Blue  -Doing well. Good vision. IOP good.  Prednisolone acetate 1% - 4/3/2/1 weekly taper  Ofloxacin - 4 times a day x 3 more days, then stop  Ketorolac - 4 times a day x 1 more week, then stop  Artificial tears PRN  No heavy lifting over 15-20 lbs, do not get eye wet, no eye rubbing. Discontinue eye shield at bedtime but wear sunglasses/glasses during the day. Advised to call if any redness, pain, decreased vision, flashes, floaters. F/u 6-8 weeks - refract both eyes and dilate left eye.     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 OD: Thin S/I, bord T. OS: Thin S/T. Bord I. 61 OU. Stable from 8/14/23.  -HVF 24-2 (2/12/24) - OD: 13/14FL 3%FP. Scattered nasal, but WNL. OS: 5/16FL 1%FN. Inferonasal depression. Similar to 5/21/18.   -Patient had stopped latanoprost 9/2022 due to foreign body sensation, burning, irritation. Had been taking latanoprost OU since 2016 and not experienced symptoms before. Recommended changing to timolol 0.5% OU qAM, but patient declined to take due to potential side effects (cardiac). Restarted latanoprost OU QHS 12/2022.  -Plan: Discussed importance of compliance with drops and office visits due to risk of permanent vision loss with glaucoma.  Patient using latanoprost OU nightly without issue - reports good compliance. Plan to repeat OCT RNFL in 6 months.     Meibomian gland dysfunction (MGD)H02.89  Blepharitis of both eyesH01.003  -FBS, irritation, redness, itching OS>OD  -History of eyelids and eyebrows itchy like there are mites in them - likely some dryness and blepharitis -use erythromycin ophthalmic ointment.  -Continue warm compresses 1-2x/day  -Baby shampoo lid wash daily or iVizia wipes (samples given) - feels that these don't help  -Using artificial tears once a day, advised to increase to 3-4x/day  -Will  Rx: Azelastine OU BID prn itching/allergies. Eyes are itchy mainly in the fall - possible allergy component.   -Can consider Xdemvy as needed, or Restasis, Cequa, Vevye, Xiidra, Miebo, Tyrvaya for keratoconjunctivitis sicca. Verkazia for vernal conjunctivitis. Punctal plugs.     PVD (posterior vitreous detachment), right eyeH43.811  -Stable, continue to monitor.     Pseudophakia of right eyeZ96.1 - 12/7/17  -Doing well. Good vision. IOP good.     Pre-wenvhizlW61.03  Hypertensive retinopathy of both eyesH35.033  -OCT macula (9/23/24) - SS: 4/10 OD and 4/10 OS. Normal thickness and contour OU. Intact IS-OS OU. No edema OU. 255/247. Stable from 12/19/22.   -HbA1c= 6.8 (7/24/24 POCT). On Metformin. No diabetic retinopathy seen. Continue close monitoring of blood glucose, blood pressure, and cholesterol. Plan for annual dilated eye exam. Advised smoking cessation.     Myopia of both eyes with astigmatism and disxkvmglnC38.13  -Refraction performed today for diagnostic purposes only and without specific intent to dispense Rx. Glasses Rx not dispensed today.   -F/u 6-8 weeks - refract both eyes and dilate left eye.         No history of refractive surgery.

## 2024-11-29 NOTE — PATIENT INSTRUCTIONS
Surgeon: Zehra Goldstein MD                   [phone]      Patient name: Taylor Richard    Date of visit: November 29, 2024      left eye    Prednisolone acetate (pink or white cap) - 4 times a day for 1 week, 3 times a day for 1 week, 2 times a day for 1 week, once a day for 1 week, then stop    Ofloxacin (tan cap) - 4 times a day for 3 more days, then stop    Ketorolac (gray cap) - 4 times a day for 1 more week, then stop      No heavy lifting over 15-20 lbs, try not to get eye wet, no eye rubbing. You may stop wearing the eye shield at night. Please continue wearing glasses or sunglasses during the day to protect your eyes. Please call immediately if you develop any redness, pain, decreased vision, flashes, floaters.     Surgical Scheduler (during office hours) - Norah: [phone]

## 2024-12-03 DIAGNOSIS — H40.003 GLAUCOMA SUSPECT OF BOTH EYES: ICD-10-CM

## 2024-12-03 RX ORDER — LATANOPROST 50 UG/ML
SOLUTION/ DROPS OPHTHALMIC
Qty: 10 ML | Refills: 3 | Status: SHIPPED | OUTPATIENT
Start: 2024-12-03

## 2024-12-13 ENCOUNTER — LAB (OUTPATIENT)
Dept: LAB | Facility: LAB | Age: 74
End: 2024-12-13
Payer: MEDICARE

## 2024-12-13 ENCOUNTER — APPOINTMENT (OUTPATIENT)
Dept: PRIMARY CARE | Facility: CLINIC | Age: 74
End: 2024-12-13
Payer: MEDICARE

## 2024-12-13 VITALS
DIASTOLIC BLOOD PRESSURE: 80 MMHG | SYSTOLIC BLOOD PRESSURE: 132 MMHG | HEART RATE: 80 BPM | HEIGHT: 61 IN | WEIGHT: 148.6 LBS | BODY MASS INDEX: 28.05 KG/M2 | OXYGEN SATURATION: 99 %

## 2024-12-13 DIAGNOSIS — Z98.61 CAD S/P PERCUTANEOUS CORONARY ANGIOPLASTY: ICD-10-CM

## 2024-12-13 DIAGNOSIS — F10.10 NONDEPENDENT ALCOHOL ABUSE, EPISODIC DRINKING BEHAVIOR: ICD-10-CM

## 2024-12-13 DIAGNOSIS — B37.31 VAGINAL YEAST INFECTION: ICD-10-CM

## 2024-12-13 DIAGNOSIS — E78.5 HYPERLIPIDEMIA, UNSPECIFIED HYPERLIPIDEMIA TYPE: ICD-10-CM

## 2024-12-13 DIAGNOSIS — M05.79 RHEUMATOID ARTHRITIS INVOLVING MULTIPLE SITES WITH POSITIVE RHEUMATOID FACTOR (MULTI): ICD-10-CM

## 2024-12-13 DIAGNOSIS — R82.90 ABNORMAL URINE ODOR: ICD-10-CM

## 2024-12-13 DIAGNOSIS — N39.0 URINARY TRACT INFECTION WITHOUT HEMATURIA, SITE UNSPECIFIED: ICD-10-CM

## 2024-12-13 DIAGNOSIS — Z87.891 PERSONAL HISTORY OF NICOTINE DEPENDENCE: ICD-10-CM

## 2024-12-13 DIAGNOSIS — I70.0 ATHEROSCLEROSIS OF AORTA (CMS-HCC): ICD-10-CM

## 2024-12-13 DIAGNOSIS — D64.9 ANEMIA, UNSPECIFIED TYPE: ICD-10-CM

## 2024-12-13 DIAGNOSIS — J43.2 CENTRILOBULAR EMPHYSEMA (MULTI): ICD-10-CM

## 2024-12-13 DIAGNOSIS — E11.9 TYPE 2 DIABETES MELLITUS WITHOUT COMPLICATION, WITHOUT LONG-TERM CURRENT USE OF INSULIN (MULTI): ICD-10-CM

## 2024-12-13 DIAGNOSIS — I25.10 CAD S/P PERCUTANEOUS CORONARY ANGIOPLASTY: ICD-10-CM

## 2024-12-13 DIAGNOSIS — I10 BENIGN ESSENTIAL HYPERTENSION: ICD-10-CM

## 2024-12-13 DIAGNOSIS — D68.61 ANTIPHOSPHOLIPID SYNDROME (MULTI): ICD-10-CM

## 2024-12-13 DIAGNOSIS — Z12.31 SCREENING MAMMOGRAM FOR BREAST CANCER: ICD-10-CM

## 2024-12-13 DIAGNOSIS — F17.200 ENCOUNTER FOR SCREENING FOR MALIGNANT NEOPLASM OF LUNG IN CURRENT SMOKER WITH 30 PACK YEAR HISTORY OR GREATER: ICD-10-CM

## 2024-12-13 DIAGNOSIS — Z76.89 ENCOUNTER TO ESTABLISH CARE: Primary | ICD-10-CM

## 2024-12-13 DIAGNOSIS — Z12.2 ENCOUNTER FOR SCREENING FOR MALIGNANT NEOPLASM OF LUNG IN CURRENT SMOKER WITH 30 PACK YEAR HISTORY OR GREATER: ICD-10-CM

## 2024-12-13 LAB
POC APPEARANCE, URINE: CLEAR
POC BILIRUBIN, URINE: NEGATIVE
POC BLOOD, URINE: NEGATIVE
POC COLOR, URINE: YELLOW
POC GLUCOSE, URINE: NEGATIVE MG/DL
POC KETONES, URINE: NEGATIVE MG/DL
POC LEUKOCYTES, URINE: NEGATIVE
POC NITRITE,URINE: NEGATIVE
POC PH, URINE: 5 PH
POC PROTEIN, URINE: NORMAL MG/DL
POC SPECIFIC GRAVITY, URINE: 1.02
POC UROBILINOGEN, URINE: 0.2 EU/DL

## 2024-12-13 PROCEDURE — 99214 OFFICE O/P EST MOD 30 MIN: CPT

## 2024-12-13 PROCEDURE — 3060F POS MICROALBUMINURIA REV: CPT

## 2024-12-13 PROCEDURE — 1160F RVW MEDS BY RX/DR IN RCRD: CPT

## 2024-12-13 PROCEDURE — 3044F HG A1C LEVEL LT 7.0%: CPT

## 2024-12-13 PROCEDURE — 3008F BODY MASS INDEX DOCD: CPT

## 2024-12-13 PROCEDURE — 3079F DIAST BP 80-89 MM HG: CPT

## 2024-12-13 PROCEDURE — 3048F LDL-C <100 MG/DL: CPT

## 2024-12-13 PROCEDURE — 81003 URINALYSIS AUTO W/O SCOPE: CPT

## 2024-12-13 PROCEDURE — 3075F SYST BP GE 130 - 139MM HG: CPT

## 2024-12-13 PROCEDURE — 1159F MED LIST DOCD IN RCRD: CPT

## 2024-12-13 RX ORDER — FLUCONAZOLE 150 MG/1
150 TABLET ORAL ONCE
Qty: 1 TABLET | Refills: 0 | Status: SHIPPED | OUTPATIENT
Start: 2024-12-13 | End: 2024-12-13

## 2024-12-13 ASSESSMENT — ENCOUNTER SYMPTOMS
DEPRESSION: 0
LOSS OF SENSATION IN FEET: 0
OCCASIONAL FEELINGS OF UNSTEADINESS: 0

## 2024-12-13 NOTE — PROGRESS NOTES
"Reason for Visit: NPV/ est care    HPI:  HPI    NPV/ est care    CAD s/p OM stent , athero of Ao, HTN, DM, GERD, Anemia    DM II  Lab Results   Component Value Date    HGBA1C 5.6 2024     Having vaginal itching & odorous urine; no abnormal vaginal discharge, no pelvic pain, no flank pain, no back pain, no fevers, no chills, no pain with urination, no urinary frequency    Quit smoking 6 years ago; smoked for about 35-40 years, about 1.5PPD; started at age 16  CT lung cancer screening    Excessive alcohol use; declines resources  Hepatitis C infection , cured with antiviral    RA    HTN    Emphysema- breathing has been okay    Anemia    CAD s/p percutaneous coronary angioplasty  Antiphospholipid syndrome  Atherosclerosis of aorta    HLD      HM:  -Mammogram: due; her daughter  at age 35 from breast cancer  -Colonoscopy: scheduled for 25    Hgb A1c:   Lab Results   Component Value Date    HGBA1C 5.6 2024     Cholesterol panel:   Lab Results   Component Value Date    CHOL 155 2024    CHOL 153 2023    CHOL CANCELED 2023     Lab Results   Component Value Date    HDL 42.0 2024    HDL 44.2 2023    HDL CANCELED 2023      Lab Results   Component Value Date    LDLCALC 89 2024     Lab Results   Component Value Date    TRIG 120 2024    TRIG 115 2023    TRIG CANCELED 2023     No components found for: \"CHOLHDL\"      Active Problem List  Patient Active Problem List   Diagnosis    Allergic rhinitis    Antiphospholipid syndrome (Multi)    Atherosclerosis of aorta (CMS-HCC)    Benign essential hypertension    Bilateral myopia    Binocular visual disturbance    CAD S/P percutaneous coronary angioplasty    Blepharitis of both eyes    Cervicalgia    Chronic liver disease    Combined form of age-related cataract, left eye    Degenerative arthritis of lumbar spine    Dry eye    Dysphonia    GERD (gastroesophageal reflux disease)    Hepatitis C virus " infection cured after antiviral drug therapy    HLD (hyperlipidemia)    Nuclear sclerosis    Numbness and tingling of both feet    POAG (primary open-angle glaucoma)    Postmenopausal atrophic vaginitis    Postmenopausal bone loss    Presbyopia    PVD (posterior vitreous detachment), right eye    Rheumatoid arthritis involving multiple sites with positive rheumatoid factor (Multi)    Lumbar pain    Adhesive capsulitis of shoulder    Anemia    Chest pain    Cholelithiasis    Diabetes mellitus (Multi)    Eustachian tube dysfunction    Elevated serum creatinine    Globus sensation    Hair thinning    Hearing loss sensory, bilateral    Left foot pain    Osteoarthritis    Right hip pain    Sensation of cold in lower extremity    Spasm of back muscles    Constipation    Arteriosclerosis of coronary artery    Arthralgia of ankle    Blepharitis    Cold extremities    Disorder of lung    Edema of glottis    Hypertensive retinopathy    Meibomian gland dysfunction    Myopia    Nondependent alcohol abuse, episodic drinking behavior    Nuclear senile cataract    Prediabetes    Glaucoma suspect of both eyes    Pseudophakia    Astigmatism of both eyes    Centrilobular emphysema (Multi)    Nasal discharge    Symptomatic cholelithiasis    Encounter to establish care       Comprehensive Medical/Surgical/Social/Family History  Past Medical History:   Diagnosis Date    Acute candidiasis of vulva and vagina 01/06/2017    Vaginal candida    Age-related nuclear cataract, left eye 05/21/2018    Age-related nuclear cataract, left    Age-related nuclear cataract, right eye 01/10/2018    Age-related nuclear cataract, right    Cellulitis of left lower limb 05/27/2021    Cellulitis of left lower extremity    Cholelithiasis     Chronic viral hepatitis C (Multi) 08/14/2017    Chronic viral hepatitis C    Combined forms of age-related cataract, bilateral 12/13/2017    Combined form of age-related cataract, both eyes    Contusion of right eyelid  and periocular area, initial encounter 01/10/2018    Periorbital ecchymosis of right eye    Coronary artery disease     Cortical age-related cataract, left eye 05/21/2018    Cortical age-related cataract of left eye    Cortical age-related cataract, right eye 01/10/2018    Cortical age-related cataract of right eye    Edema of larynx 12/30/2016    Vocal cord edema    Emphysema lung (Multi)     Encounter for immunization 05/26/2021    Encounter for immunization    GERD (gastroesophageal reflux disease)     Hepatitis C     Hypertensive retinopathy, bilateral 12/19/2022    Hypertensive retinopathy of both eyes    Meibomian gland dysfunction of unspecified eye, unspecified eyelid 12/19/2022    Meibomian gland dysfunction (MGD)    Myopia, bilateral 12/19/2022    Myopia of both eyes with astigmatism and presbyopia    Ocular hypertension, unspecified eye 01/15/2016    Elevated IOP    Ocular pain, right eye 01/10/2018    Pain of right orbit    Other conditions influencing health status 05/21/2018    History of cough    Other pulmonary collapse 06/28/2016    Collapsed lung    Other specified noninflammatory disorders of vulva and perineum 12/15/2014    Vulvar lesion    Pain in right shoulder 04/06/2016    Right shoulder pain    Pain in unspecified ankle and joints of unspecified foot 10/27/2015    Ankle joint pain    Personal history of nicotine dependence 01/04/2023    Former cigarette smoker    Personal history of other diseases of the digestive system 08/17/2017    History of cholelithiasis    Personal history of other diseases of the respiratory system 05/03/2018    History of acute bronchitis    Personal history of other infectious and parasitic diseases 09/15/2021    History of candidiasis of vagina    Personal history of other infectious and parasitic diseases     History of hepatitis    Personal history of other specified conditions 08/14/2017    History of nausea    Personal history of other specified conditions  03/27/2018    History of abdominal pain    Prediabetes 12/19/2022    Pre-diabetes    Preglaucoma, unspecified, bilateral 12/19/2022    Glaucoma suspect of both eyes    Presence of intraocular lens 02/15/2018    Bilateral pseudophakia    Presence of intraocular lens 12/19/2022    Pseudophakia of right eye    Rheumatoid arthritis     Right upper quadrant pain 10/14/2021    Right upper quadrant pain    Right upper quadrant pain 10/14/2021    RUQ pain    Strain of unspecified muscle, fascia and tendon at shoulder and upper arm level, right arm, initial encounter 04/06/2016    Right shoulder strain    Systemic lupus erythematosus, unspecified 12/19/2022    Lupus (systemic lupus erythematosus)    Systemic lupus erythematosus, unspecified 11/29/2017    History of systemic lupus erythematosus (SLE)    Type 2 diabetes mellitus without complications (Multi) 01/04/2023    Diabetes mellitus    Type 2 diabetes mellitus without complications (Multi) 12/13/2017    Controlled diabetes mellitus type II without complication    Type 2 diabetes mellitus without complications (Multi) 10/31/2017    Controlled diabetes mellitus type II without complication    Unspecified symptoms and signs involving the genitourinary system 09/15/2021    UTI symptoms    Unspecified visual field defects 04/27/2016    Visual field defect     Past Surgical History:   Procedure Laterality Date    CATARACT EXTRACTION W/  INTRAOCULAR LENS IMPLANT Right 12/07/2017    Dr. Kaur    CATARACT EXTRACTION W/  INTRAOCULAR LENS IMPLANT Left 11/21/2024    Dr. Kaur, Aim: -2.50sph    CHOLECYSTECTOMY      10/7/24    CT ANGIO CORONARY ART WITH HEARTFLOW IF SCORE >30%  02/18/2019    CT HEART CORONARY ANGIOGRAM 2/18/2019 [facility]    OTHER SURGICAL HISTORY  10/14/2021    Complete colonoscopy    OTHER SURGICAL HISTORY  10/14/2021    Endoscopy    OTHER SURGICAL HISTORY  10/14/2021    Oral surgery    OTHER SURGICAL HISTORY  03/17/2022    Cardiac catheterization with stent  placement    TOTAL ABDOMINAL HYSTERECTOMY W/ BILATERAL SALPINGOOPHORECTOMY  11/07/2014    Total Abdominal Hysterectomy With Removal Of Both Ovaries     Social History     Social History Narrative    Not on file         Allergies and Medications  Acetaminophen, Isosorbide, Pravastatin, Codeine, Losartan, and Penicillins  Current Outpatient Medications on File Prior to Visit   Medication Sig Dispense Refill    albuterol 90 mcg/actuation inhaler INHALE 2 INHALATIONS BY MOUTH  EVERY 4 HOURS IF NEEDED FOR  WHEEZING OR SHORTNESS OF BREATH 40.2 g 2    aspirin 81 mg EC tablet Take 1 tablet (81 mg) by mouth once daily.      azelastine (Optivar) 0.05 % ophthalmic solution 1 drop both eyes 2 times a day as needed for itching/allergies 6 mL 6    ezetimibe (Zetia) 10 mg tablet Take 1 tablet (10 mg) by mouth once daily. 100 tablet 3    nitroglycerin (Nitrostat) 0.4 mg SL tablet DISSOLVE 1 TABLET UNDER THE  TONGUE EVERY 5 MINUTES AS NEEDED FOR CHEST PAIN. MAX OF 3 TABLETS IN 15 MINUTES. CALL 911 IF PAIN  PERSISTS. 100 tablet 3    omeprazole (PriLOSEC) 20 mg DR capsule TAKE 1 CAPSULE BY MOUTH ONCE  DAILY IN THE MORNING TAKE BEFORE A MEAL. DONT CRUSH OR CHEW 100 capsule 2    OneTouch Ultra Test strip Use to check blood sugar 3 times daily. 300 strip 2    semaglutide (Rybelsus) 3 mg tablet RYBELSUS 3 mg tab 04/11/2023 Active      ferrous gluconate (Fergon) 324 (38 Fe) mg tablet Take 1 tablet by mouth with breakfast 3 times per week (Patient taking differently: Take 1 tablet (38 mg of iron) by mouth once a day on Monday, Wednesday, and Friday. Take 1 tablet by mouth with breakfast 3 times per week) 84 tablet 3    ketorolac (Acular) 0.5 % ophthalmic solution 1 drop to surgical eye 4 times a day starting 1 day before surgery 5 mL 2    latanoprost (Xalatan) 0.005 % ophthalmic solution INSTILL 1 DROP INTO BOTH EYES  ONCE DAILY AT BEDTIME 10 mL 3    metFORMIN (Glucophage) 500 mg tablet TAKE 2 TABLETS BY MOUTH IN THE  MORNING AND 1 TABLET  "IN THE  EVENING (Patient taking differently: Take 1-2 tablets (500-1,000 mg) by mouth 2 times daily (morning and late afternoon). TAKE 2 TABLETS BY MOUTH IN THE  MORNING AND 1 TABLET IN THE  EVENING) 300 tablet 2    ofloxacin (Ocuflox) 0.3 % ophthalmic solution 1 drop to operative eye 4 times a day starting 1 day before surgery 5 mL 2    prednisoLONE acetate (Pred-Forte) 1 % ophthalmic suspension 1 drop to operative eye 4 times a day starting the day of surgery (after surgery is done) 5 mL 2     No current facility-administered medications on file prior to visit.         ROS otherwise negative aside from what was mentioned above in HPI.  Review of Systems      Vitals  /80   Pulse 80   Ht 1.549 m (5' 1\")   Wt 67.4 kg (148 lb 9.6 oz)   SpO2 99%   BMI 28.08 kg/m²   Body mass index is 28.08 kg/m².    Physical Exam  Physical Exam  Vitals reviewed.   Constitutional:       General: She is not in acute distress.     Appearance: Normal appearance. She is normal weight. She is not ill-appearing, toxic-appearing or diaphoretic.   HENT:      Head: Normocephalic and atraumatic.      Nose: Nose normal.   Eyes:      Conjunctiva/sclera: Conjunctivae normal.   Cardiovascular:      Rate and Rhythm: Normal rate and regular rhythm.      Pulses: Normal pulses.      Heart sounds: Normal heart sounds. No murmur heard.     No friction rub. No gallop.   Pulmonary:      Effort: Pulmonary effort is normal. No respiratory distress.      Breath sounds: Normal breath sounds.   Abdominal:      General: Abdomen is flat. Bowel sounds are normal.      Palpations: Abdomen is soft.   Musculoskeletal:         General: Normal range of motion.      Cervical back: Normal range of motion and neck supple.   Lymphadenopathy:      Cervical: No cervical adenopathy.   Skin:     General: Skin is warm and dry.      Capillary Refill: Capillary refill takes less than 2 seconds.   Neurological:      General: No focal deficit present.      Mental Status: " "She is alert and oriented to person, place, and time. Mental status is at baseline.   Psychiatric:         Mood and Affect: Mood normal.         Behavior: Behavior normal.         Thought Content: Thought content normal.         Judgment: Judgment normal.           Assessment and Plan:  Problem List Items Addressed This Visit    NPV/ est care         ICD-10-CM    Antiphospholipid syndrome (Multi)  Stable, on zetia D68.61    Atherosclerosis of aorta (CMS-HCC)  Stable, on zetia & asa  CT A/P on 10/6/24: Calcified atheromatous disease is seen in the visualized  arterial tree I70.0    Benign essential hypertension I10    Stable, no meds  Relevant Orders    Comprehensive metabolic panel (Completed)    CAD S/P percutaneous coronary angioplasty  Stable, on zetia & asa; has NTG I25.10, Z98.61    HLD (hyperlipidemia)  Stable  C/w zetia   Lab Results   Component Value Date    CHOL 155 07/25/2024    CHOL 153 05/30/2023    CHOL CANCELED 05/30/2023     Lab Results   Component Value Date    HDL 42.0 07/25/2024    HDL 44.2 05/30/2023    HDL CANCELED 05/30/2023     Lab Results   Component Value Date    LDLCALC 89 07/25/2024     Lab Results   Component Value Date    TRIG 120 07/25/2024    TRIG 115 05/30/2023    TRIG CANCELED 05/30/2023     No components found for: \"CHOLHDL\"   E78.5    Rheumatoid arthritis involving multiple sites with positive rheumatoid factor (Multi)  Stable, no meds M05.79    Anemia D64.9    Stable; c/w iron supplement  Relevant Orders    CBC and Auto Differential (Completed)    Comprehensive metabolic panel (Completed)    Ferritin (Completed)    Iron and TIBC (Completed)    Folate (Completed)    Vitamin B12 (Completed)    Diabetes mellitus (Multi) E11.9    Stable  C/w metformin  Lab Results   Component Value Date    HGBA1C 5.6 12/16/2024   Relevant Orders    Hemoglobin A1C (Completed)    Nondependent alcohol abuse, episodic drinking behavior F10.10    Patient declines resources  Relevant Orders    Comprehensive " metabolic panel (Completed)    Centrilobular emphysema (Multi) J43.2    Stable; c/w inhaler PRN  Relevant Orders    CBC and Auto Differential (Completed)    Encounter to establish care - Primary Z76.89     Other Visit Diagnoses         Codes    Screening mammogram for breast cancer     Z12.31    Relevant Orders    BI mammo bilateral screening tomosynthesis    Encounter for screening for malignant neoplasm of lung in current smoker with 30 pack year history or greater     Z12.2, F17.200    Relevant Orders    CT lung screening low dose    Urinary tract infection without hematuria, site unspecified     N39.0    Abnormal urine odor     R82.90    Relevant Orders    POCT UA Automated manually resulted (Completed)    Personal history of nicotine dependence     Z87.891    Relevant Orders    CT lung screening low dose    Vaginal yeast infection     B37.31   Start Diflucan     HM:  -Mammogram: due; her daughter  at age 35 from breast cancer  -Colonoscopy: scheduled for 25      Encourage diet and exercise to maintain healthy lifestyle.       Follow up in 3 months or sooner if needed    Maru Daniel, GISELA-CNP

## 2024-12-16 ENCOUNTER — LAB (OUTPATIENT)
Dept: LAB | Facility: LAB | Age: 74
End: 2024-12-16
Payer: MEDICARE

## 2024-12-16 LAB
ALBUMIN SERPL BCP-MCNC: 4.4 G/DL (ref 3.4–5)
ALP SERPL-CCNC: 78 U/L (ref 33–136)
ALT SERPL W P-5'-P-CCNC: 8 U/L (ref 7–45)
ANION GAP SERPL CALC-SCNC: 15 MMOL/L (ref 10–20)
AST SERPL W P-5'-P-CCNC: 15 U/L (ref 9–39)
BASOPHILS # BLD AUTO: 0.06 X10*3/UL (ref 0–0.1)
BASOPHILS NFR BLD AUTO: 0.9 %
BILIRUB SERPL-MCNC: 0.3 MG/DL (ref 0–1.2)
BUN SERPL-MCNC: 12 MG/DL (ref 6–23)
CALCIUM SERPL-MCNC: 10.2 MG/DL (ref 8.6–10.3)
CHLORIDE SERPL-SCNC: 106 MMOL/L (ref 98–107)
CO2 SERPL-SCNC: 24 MMOL/L (ref 21–32)
CREAT SERPL-MCNC: 0.8 MG/DL (ref 0.5–1.05)
EGFRCR SERPLBLD CKD-EPI 2021: 77 ML/MIN/1.73M*2
EOSINOPHIL # BLD AUTO: 0.27 X10*3/UL (ref 0–0.4)
EOSINOPHIL NFR BLD AUTO: 3.8 %
ERYTHROCYTE [DISTWIDTH] IN BLOOD BY AUTOMATED COUNT: 15.4 % (ref 11.5–14.5)
EST. AVERAGE GLUCOSE BLD GHB EST-MCNC: 114 MG/DL
FERRITIN SERPL-MCNC: 38 NG/ML (ref 8–150)
FOLATE SERPL-MCNC: 9.7 NG/ML
GLUCOSE SERPL-MCNC: 95 MG/DL (ref 74–99)
HBA1C MFR BLD: 5.6 %
HCT VFR BLD AUTO: 34 % (ref 36–46)
HGB BLD-MCNC: 11.2 G/DL (ref 12–16)
IMM GRANULOCYTES # BLD AUTO: 0.02 X10*3/UL (ref 0–0.5)
IMM GRANULOCYTES NFR BLD AUTO: 0.3 % (ref 0–0.9)
IRON SATN MFR SERPL: 28 % (ref 25–45)
IRON SERPL-MCNC: 108 UG/DL (ref 35–150)
LYMPHOCYTES # BLD AUTO: 3.62 X10*3/UL (ref 0.8–3)
LYMPHOCYTES NFR BLD AUTO: 51.6 %
MCH RBC QN AUTO: 24.9 PG (ref 26–34)
MCHC RBC AUTO-ENTMCNC: 32.9 G/DL (ref 32–36)
MCV RBC AUTO: 76 FL (ref 80–100)
MONOCYTES # BLD AUTO: 0.42 X10*3/UL (ref 0.05–0.8)
MONOCYTES NFR BLD AUTO: 6 %
NEUTROPHILS # BLD AUTO: 2.63 X10*3/UL (ref 1.6–5.5)
NEUTROPHILS NFR BLD AUTO: 37.4 %
NRBC BLD-RTO: 0 /100 WBCS (ref 0–0)
PLATELET # BLD AUTO: 260 X10*3/UL (ref 150–450)
POTASSIUM SERPL-SCNC: 4.1 MMOL/L (ref 3.5–5.3)
PROT SERPL-MCNC: 7.5 G/DL (ref 6.4–8.2)
RBC # BLD AUTO: 4.49 X10*6/UL (ref 4–5.2)
SODIUM SERPL-SCNC: 141 MMOL/L (ref 136–145)
TIBC SERPL-MCNC: 386 UG/DL (ref 240–445)
UIBC SERPL-MCNC: 278 UG/DL (ref 110–370)
VIT B12 SERPL-MCNC: 369 PG/ML (ref 211–911)
WBC # BLD AUTO: 7 X10*3/UL (ref 4.4–11.3)

## 2024-12-16 PROCEDURE — 83540 ASSAY OF IRON: CPT

## 2024-12-16 PROCEDURE — 85025 COMPLETE CBC W/AUTO DIFF WBC: CPT

## 2024-12-16 PROCEDURE — 83550 IRON BINDING TEST: CPT

## 2024-12-16 PROCEDURE — 82746 ASSAY OF FOLIC ACID SERUM: CPT

## 2024-12-16 PROCEDURE — 82728 ASSAY OF FERRITIN: CPT

## 2024-12-16 PROCEDURE — 82607 VITAMIN B-12: CPT

## 2024-12-16 PROCEDURE — 80053 COMPREHEN METABOLIC PANEL: CPT

## 2024-12-16 PROCEDURE — 83036 HEMOGLOBIN GLYCOSYLATED A1C: CPT

## 2024-12-17 ENCOUNTER — TELEPHONE (OUTPATIENT)
Dept: CARDIOLOGY | Facility: HOSPITAL | Age: 74
End: 2024-12-17
Payer: MEDICARE

## 2024-12-17 NOTE — TELEPHONE ENCOUNTER
Patient brought paperwork to  [facility].  Dr. Blandon signed paperwork and paperwork faxed per patient request.  Paperwork to be scanned to [facility].

## 2025-01-02 ENCOUNTER — TELEPHONE (OUTPATIENT)
Dept: CARDIOLOGY | Facility: HOSPITAL | Age: 75
End: 2025-01-02

## 2025-01-02 DIAGNOSIS — I25.10 CAD S/P PERCUTANEOUS CORONARY ANGIOPLASTY: Primary | ICD-10-CM

## 2025-01-02 DIAGNOSIS — R07.9 CHEST PAIN, UNSPECIFIED TYPE: ICD-10-CM

## 2025-01-02 DIAGNOSIS — Z98.61 CAD S/P PERCUTANEOUS CORONARY ANGIOPLASTY: Primary | ICD-10-CM

## 2025-01-08 ENCOUNTER — APPOINTMENT (OUTPATIENT)
Dept: PRIMARY CARE | Facility: CLINIC | Age: 75
End: 2025-01-08
Payer: MEDICARE

## 2025-01-13 ENCOUNTER — TELEPHONE (OUTPATIENT)
Dept: OPHTHALMOLOGY | Facility: CLINIC | Age: 75
End: 2025-01-13
Payer: MEDICARE

## 2025-01-13 DIAGNOSIS — H01.004 BLEPHARITIS OF UPPER EYELIDS OF BOTH EYES, UNSPECIFIED TYPE: ICD-10-CM

## 2025-01-13 DIAGNOSIS — H01.001 BLEPHARITIS OF UPPER EYELIDS OF BOTH EYES, UNSPECIFIED TYPE: ICD-10-CM

## 2025-01-13 RX ORDER — AZELASTINE HYDROCHLORIDE 0.5 MG/ML
SOLUTION/ DROPS OPHTHALMIC
Qty: 6 ML | Refills: 6 | Status: SHIPPED | OUTPATIENT
Start: 2025-01-13

## 2025-01-13 NOTE — TELEPHONE ENCOUNTER
PT called, sts lots of itching OS with watering when she goes to bed, +crusty eyelid AM OS x week. Pt sts not using Rx allergy drops, only using Latanoprost qhs OU. Recom pt use the Azelstine-allergy drop OS, use hot compresses BID OU, art tears 3-4 times a day x 1-2 weeks. Instructed pt if no improvement, to call me back at [phone] to see Dr. Kaur. Pt expressed verbal understanding of all information given.

## 2025-01-14 ENCOUNTER — APPOINTMENT (OUTPATIENT)
Dept: CARDIOLOGY | Facility: HOSPITAL | Age: 75
End: 2025-01-14
Payer: MEDICARE

## 2025-01-16 ENCOUNTER — HOSPITAL ENCOUNTER (OUTPATIENT)
Dept: RADIOLOGY | Facility: HOSPITAL | Age: 75
Discharge: HOME | End: 2025-01-16
Payer: MEDICARE

## 2025-01-16 DIAGNOSIS — Z12.2 ENCOUNTER FOR SCREENING FOR MALIGNANT NEOPLASM OF LUNG IN CURRENT SMOKER WITH 30 PACK YEAR HISTORY OR GREATER: ICD-10-CM

## 2025-01-16 DIAGNOSIS — F17.200 ENCOUNTER FOR SCREENING FOR MALIGNANT NEOPLASM OF LUNG IN CURRENT SMOKER WITH 30 PACK YEAR HISTORY OR GREATER: ICD-10-CM

## 2025-01-16 DIAGNOSIS — Z87.891 PERSONAL HISTORY OF NICOTINE DEPENDENCE: ICD-10-CM

## 2025-01-16 PROCEDURE — 71271 CT THORAX LUNG CANCER SCR C-: CPT | Performed by: RADIOLOGY

## 2025-01-16 PROCEDURE — 71271 CT THORAX LUNG CANCER SCR C-: CPT

## 2025-01-17 ENCOUNTER — APPOINTMENT (OUTPATIENT)
Dept: CARDIOLOGY | Facility: HOSPITAL | Age: 75
End: 2025-01-17
Payer: MEDICARE

## 2025-01-17 ENCOUNTER — TELEPHONE (OUTPATIENT)
Dept: CARDIOLOGY | Facility: HOSPITAL | Age: 75
End: 2025-01-17

## 2025-01-17 ENCOUNTER — HOSPITAL ENCOUNTER (OUTPATIENT)
Dept: CARDIOLOGY | Facility: HOSPITAL | Age: 75
Discharge: HOME | End: 2025-01-17
Payer: MEDICARE

## 2025-01-17 DIAGNOSIS — Z98.61 CAD S/P PERCUTANEOUS CORONARY ANGIOPLASTY: ICD-10-CM

## 2025-01-17 DIAGNOSIS — I25.10 CAD S/P PERCUTANEOUS CORONARY ANGIOPLASTY: ICD-10-CM

## 2025-01-17 DIAGNOSIS — R07.9 CHEST PAIN, UNSPECIFIED TYPE: ICD-10-CM

## 2025-01-17 PROCEDURE — 93325 DOPPLER ECHO COLOR FLOW MAPG: CPT

## 2025-01-17 PROCEDURE — 93016 CV STRESS TEST SUPVJ ONLY: CPT

## 2025-01-17 PROCEDURE — 93018 CV STRESS TEST I&R ONLY: CPT

## 2025-01-17 PROCEDURE — 93350 STRESS TTE ONLY: CPT

## 2025-01-20 ENCOUNTER — TELEPHONE (OUTPATIENT)
Dept: CARDIOLOGY | Facility: CLINIC | Age: 75
End: 2025-01-20
Payer: MEDICARE

## 2025-01-20 NOTE — TELEPHONE ENCOUNTER
Patient called and states she had chest pain and shortness of breath over the weekend and had to take SL NTG three times.  Today she is feeling better and denies any symptoms.  She was concerned because her imaging test showed the coronary artery calcification.  She did have stress test earlier this month.  Please advise.

## 2025-01-20 NOTE — TELEPHONE ENCOUNTER
1/20- pt returned call to [facility] line to state that she does not get hives when taking sublingual nitroglycerin but DOES get hives when taking isosorbide and thus cannot take isosorbide;  pt states she did take sublingual nitroglycerin x3 on Saturday (1/18) due to chest pain/shortness of breath but that symptoms resolved and have not returned and that prior to this most recent episode, pt had not required sublingual nitroglycerin for several months;  pt made aware of stress test results per Dr. Blandon and verbalized understanding;  pt inquired as to what other medications she can take to help control her LDL (pt cannot tolerate statins but is on Zetia);  please advise, thank you

## 2025-01-21 ENCOUNTER — TELEPHONE (OUTPATIENT)
Dept: PRIMARY CARE | Facility: CLINIC | Age: 75
End: 2025-01-21
Payer: MEDICARE

## 2025-01-21 DIAGNOSIS — Z98.61 CAD S/P PERCUTANEOUS CORONARY ANGIOPLASTY: Primary | ICD-10-CM

## 2025-01-21 DIAGNOSIS — R91.8 LUNG NODULES: Primary | ICD-10-CM

## 2025-01-21 DIAGNOSIS — I25.10 CAD S/P PERCUTANEOUS CORONARY ANGIOPLASTY: Primary | ICD-10-CM

## 2025-01-21 RX ORDER — PRAVASTATIN SODIUM 10 MG/1
10 TABLET ORAL DAILY
Qty: 90 TABLET | Refills: 3 | Status: SHIPPED | OUTPATIENT
Start: 2025-01-21 | End: 2026-01-21

## 2025-01-21 NOTE — TELEPHONE ENCOUNTER
Called pt back to inform message from PCP. Stated she will return my call because she was on the bus and couldn't hear well.

## 2025-01-21 NOTE — TELEPHONE ENCOUNTER
Per Maru Daniel, APRN-CNP... Informed Patient She needs to repeat her lung cancer screening in 6 months due to a new lung nodule. Please have her call [phone] to schedule CT chest without contrast in 6 month- order is in. The CT also showed CAD which is already known. She follows with cardiologist and is s/p OM stent 2/2022.    Pt will call and schedule

## 2025-01-21 NOTE — TELEPHONE ENCOUNTER
Patient returned call to nursing office.  Provided patient update per Dr. Blandon notation and patient verbalized understanding.  Patient provided update patient unable to tolerate pravastatin.

## 2025-01-21 NOTE — TELEPHONE ENCOUNTER
Phoned patient and no answer.  Left voicemail with contact number for cardiology nursing office ([phone]) and requested patient return call.

## 2025-01-21 NOTE — TELEPHONE ENCOUNTER
Patient returned call to nursing office. Provided patient update per Dr. Blandon notation.  Patient provided update that patient has allergy to amlodipine and the reaction is hives.  Updated allergy listing in EPIC and reviewed with patient allergy list.  Patient inquiring if there is another statin patient can try.  Patient provided update that patient has history of having had a heart cath and had a stent placed due to a ninety percent blockage.  Patient also inquiring if there is a test that can be ordered to check 'how clogged the coronary artery is'.

## 2025-01-22 ENCOUNTER — TELEPHONE (OUTPATIENT)
Dept: CARDIOLOGY | Facility: HOSPITAL | Age: 75
End: 2025-01-22
Payer: MEDICARE

## 2025-01-22 NOTE — TELEPHONE ENCOUNTER
Spoke with [facility] pharmacist and relayed message that pt has previously had an intolerance and nausea with this medication but would like to proceed with trying it again. This was documented and the prescription will be filled and sent to pt.

## 2025-01-27 ENCOUNTER — TELEPHONE (OUTPATIENT)
Dept: CARDIOLOGY | Facility: HOSPITAL | Age: 75
End: 2025-01-27
Payer: MEDICARE

## 2025-01-27 NOTE — TELEPHONE ENCOUNTER
"Phoned and spoke to patient and provided patient update per Dr. Blandon notation \"Okay to not take. \", patient verbalized understanding.   "

## 2025-01-31 ENCOUNTER — APPOINTMENT (OUTPATIENT)
Dept: NEUROLOGY | Facility: CLINIC | Age: 75
End: 2025-01-31
Payer: MEDICARE

## 2025-02-02 ASSESSMENT — CUP TO DISC RATIO: OS_RATIO: 0.25

## 2025-02-02 NOTE — PROGRESS NOTES
Pseudophakia of left eyeZ96.1 - 11/21/24 - Complex with Trypan Blue  -Doing well. Good vision. IOP good. Off all gtts.   -Continue artificial tears PRN  -F/u 6 months - refract OU, dilate OU for PCO check, OCT RNFL.     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 OD: Thin S/I, bord T. OS: Thin S/T. Bord I. 61 OU. Stable from 8/14/23.  -HVF 24-2 (2/12/24) - OD: 13/14FL 3%FP. Scattered nasal, but WNL. OS: 5/16FL 1%FN. Inferonasal depression. Similar to 5/21/18.   -Patient had stopped latanoprost 9/2022 due to foreign body sensation, burning, irritation. Had been taking latanoprost OU since 2016 and not experienced symptoms before. Recommended changing to timolol 0.5% OU qAM, but patient declined to take due to potential side effects (cardiac). Restarted latanoprost OU QHS 12/2022.  -Plan: Discussed importance of compliance with drops and office visits due to risk of permanent vision loss with glaucoma.  Patient using latanoprost OU nightly without issue - reports good compliance. Plan to repeat OCT RNFL in 6 months.     Meibomian gland dysfunction (MGD)H02.89  Blepharitis of both eyesH01.003  -FBS, irritation, redness, itching OS>OD  -History of eyelids and eyebrows itchy like there are mites in them - likely some dryness and blepharitis -use erythromycin ophthalmic ointment.  -Continue warm compresses 1-2x/day  -Baby shampoo lid wash daily or iVizia wipes (samples given) - feels that these don't help  -Using artificial tears once a day, advised to increase to 3-4x/day  -Will Rx: Azelastine OU BID prn itching/allergies. Eyes are itchy mainly in the fall - possible allergy component.   -Can consider Xdemvy as needed, or Restasis, Cequa, Vevye, Xiidra, Miebo, Tyrnany for keratoconjunctivitis sicca. Verkazia for vernal conjunctivitis. Punctal plugs.     PVD (posterior vitreous detachment), right  eyeH43.811  -Stable, continue to monitor.     Pseudophakia of right eyeZ96.1 - 12/7/17  -Doing well. Good vision. IOP good.     Pre-dcjmdevyO13.03  Hypertensive retinopathy of both eyesH35.033  -OCT macula (9/23/24) - SS: 4/10 OD and 4/10 OS. Normal thickness and contour OU. Intact IS-OS OU. No edema OU. 255/247. Stable from 12/19/22.   -HbA1c= 6.8 (7/24/24 POCT). On Metformin. No diabetic retinopathy seen. Continue close monitoring of blood glucose, blood pressure, and cholesterol. Plan for annual dilated eye exam. Advised smoking cessation.     Myopia of both eyes with astigmatism and gzyadajrumT85.13  -New Rx for glasses given per patient request. Patient's signature obtained to acknowledge and confirm that a paper copy of glasses Rx was given to patient in compliance with [facility] Eyeglass Rule. Electronic copy of Rx will also be available via [employer]/EPIC.   -F/u 6 months - refract OU, dilate OU for PCO check, OCT RNFL.         No history of refractive surgery.

## 2025-02-03 ENCOUNTER — APPOINTMENT (OUTPATIENT)
Dept: PRIMARY CARE | Facility: CLINIC | Age: 75
End: 2025-02-03
Payer: MEDICARE

## 2025-02-07 ENCOUNTER — APPOINTMENT (OUTPATIENT)
Dept: OPHTHALMOLOGY | Facility: CLINIC | Age: 75
End: 2025-02-07
Payer: MEDICARE

## 2025-02-07 DIAGNOSIS — H52.4 PRESBYOPIA: ICD-10-CM

## 2025-02-07 DIAGNOSIS — H52.203 ASTIGMATISM OF BOTH EYES, UNSPECIFIED TYPE: ICD-10-CM

## 2025-02-07 DIAGNOSIS — H40.003 GLAUCOMA SUSPECT OF BOTH EYES: ICD-10-CM

## 2025-02-07 DIAGNOSIS — H43.811 PVD (POSTERIOR VITREOUS DETACHMENT), RIGHT EYE: ICD-10-CM

## 2025-02-07 DIAGNOSIS — H01.001 BLEPHARITIS OF UPPER EYELIDS OF BOTH EYES, UNSPECIFIED TYPE: ICD-10-CM

## 2025-02-07 DIAGNOSIS — H52.13 MYOPIA OF BOTH EYES: ICD-10-CM

## 2025-02-07 DIAGNOSIS — H35.033 HYPERTENSIVE RETINOPATHY OF BOTH EYES: ICD-10-CM

## 2025-02-07 DIAGNOSIS — H02.889 MEIBOMIAN GLAND DYSFUNCTION: ICD-10-CM

## 2025-02-07 DIAGNOSIS — H01.004 BLEPHARITIS OF UPPER EYELIDS OF BOTH EYES, UNSPECIFIED TYPE: ICD-10-CM

## 2025-02-07 DIAGNOSIS — Z96.1 PSEUDOPHAKIA: Primary | ICD-10-CM

## 2025-02-07 DIAGNOSIS — R73.03 PREDIABETES: ICD-10-CM

## 2025-02-07 LAB
ALBUMIN SERPL-MCNC: 4.6 G/DL (ref 3.6–5.1)
ALBUMIN/GLOB SERPL: 1.5 (CALC) (ref 1–2.5)
ALP SERPL-CCNC: 92 U/L (ref 37–153)
ALT SERPL-CCNC: 9 U/L (ref 6–29)
AST SERPL-CCNC: 15 U/L (ref 10–35)
BILIRUB DIRECT SERPL-MCNC: 0.1 MG/DL
BILIRUB INDIRECT SERPL-MCNC: 0.1 MG/DL (CALC) (ref 0.2–1.2)
BILIRUB SERPL-MCNC: 0.2 MG/DL (ref 0.2–1.2)
GLOBULIN SER CALC-MCNC: 3.1 G/DL (CALC) (ref 1.9–3.7)
PROT SERPL-MCNC: 7.7 G/DL (ref 6.1–8.1)

## 2025-02-07 PROCEDURE — 99024 POSTOP FOLLOW-UP VISIT: CPT | Performed by: OPHTHALMOLOGY

## 2025-02-07 ASSESSMENT — ENCOUNTER SYMPTOMS
MUSCULOSKELETAL NEGATIVE: 0
CARDIOVASCULAR NEGATIVE: 0
EYES NEGATIVE: 1
ALLERGIC/IMMUNOLOGIC NEGATIVE: 0
HEMATOLOGIC/LYMPHATIC NEGATIVE: 0
CONSTITUTIONAL NEGATIVE: 0
NEUROLOGICAL NEGATIVE: 0
RESPIRATORY NEGATIVE: 0
GASTROINTESTINAL NEGATIVE: 0
PSYCHIATRIC NEGATIVE: 0
ENDOCRINE NEGATIVE: 0

## 2025-02-07 ASSESSMENT — REFRACTION_WEARINGRX
OS_CYLINDER: -1.50
OD_SPHERE: -2.25
OD_ADD: +2.50
OS_SPHERE: -3.00
OS_AXIS: 085
OD_AXIS: 090
OD_CYLINDER: -0.50
OS_ADD: +2.50

## 2025-02-07 ASSESSMENT — REFRACTION_MANIFEST
OD_ADD: +2.50
OS_CYLINDER: -0.50
OD_CYLINDER: -0.50
OD_SPHERE: -2.25
OS_SPHERE: -2.50
OS_ADD: +2.50
OD_AXIS: 090
OS_AXIS: 080

## 2025-02-07 ASSESSMENT — PACHYMETRY
OD_CT(UM): 510
OS_CT(UM): 520

## 2025-02-07 ASSESSMENT — TONOMETRY
OD_IOP_MMHG: 15
OS_IOP_MMHG: 16
IOP_METHOD: GOLDMANN APPLANATION

## 2025-02-07 ASSESSMENT — VISUAL ACUITY
OS_SC: 20/200
METHOD: SNELLEN - LINEAR
OD_CC: 20/20

## 2025-02-10 ENCOUNTER — TELEPHONE (OUTPATIENT)
Dept: GASTROENTEROLOGY | Facility: HOSPITAL | Age: 75
End: 2025-02-10
Payer: MEDICARE

## 2025-02-11 ENCOUNTER — TELEPHONE (OUTPATIENT)
Dept: GASTROENTEROLOGY | Facility: HOSPITAL | Age: 75
End: 2025-02-11
Payer: MEDICARE

## 2025-02-11 NOTE — TELEPHONE ENCOUNTER
Patient called asking for clarification regarding the results of her hepatic function panel.     She wanted it noted that she would like to know what it means.

## 2025-02-17 ENCOUNTER — APPOINTMENT (OUTPATIENT)
Dept: GASTROENTEROLOGY | Facility: CLINIC | Age: 75
End: 2025-02-17
Payer: MEDICARE

## 2025-02-24 ENCOUNTER — APPOINTMENT (OUTPATIENT)
Dept: PRIMARY CARE | Facility: CLINIC | Age: 75
End: 2025-02-24
Payer: MEDICARE

## 2025-02-25 ENCOUNTER — APPOINTMENT (OUTPATIENT)
Dept: AUDIOLOGY | Facility: CLINIC | Age: 75
End: 2025-02-25
Payer: MEDICARE

## 2025-02-28 ENCOUNTER — LAB (OUTPATIENT)
Dept: LAB | Facility: HOSPITAL | Age: 75
End: 2025-02-28
Payer: MEDICARE

## 2025-02-28 DIAGNOSIS — R79.89 ABNORMAL CBC: ICD-10-CM

## 2025-02-28 LAB — PROT SERPL-MCNC: 8 G/DL (ref 6.4–8.2)

## 2025-02-28 PROCEDURE — 84155 ASSAY OF PROTEIN SERUM: CPT

## 2025-03-03 LAB
ALBUMIN: 4.3 G/DL (ref 3.4–5)
ALPHA 1 GLOBULIN: 0.3 G/DL (ref 0.2–0.6)
ALPHA 2 GLOBULIN: 1 G/DL (ref 0.4–1.1)
BETA GLOBULIN: 1.1 G/DL (ref 0.5–1.2)
GAMMA GLOBULIN: 1.2 G/DL (ref 0.5–1.4)
PATH REVIEW-SERUM PROTEIN ELECTROPHORESIS: NORMAL
PROTEIN ELECTROPHORESIS COMMENT: NORMAL

## 2025-03-06 ENCOUNTER — TELEPHONE (OUTPATIENT)
Dept: ADMISSION | Facility: HOSPITAL | Age: 75
End: 2025-03-06
Payer: MEDICARE

## 2025-03-06 NOTE — TELEPHONE ENCOUNTER
Patient called and states she had a lab test on 2/28/25 that was ordered by Dr. Pandya: Serum Protein Electrophoresis - [facility] but is requesting a call back to review results and if she needs follow up visit with team.  No FUV scheduled. Last visit 7/23/24, canceled last visit 8/27/25.

## 2025-03-07 ENCOUNTER — APPOINTMENT (OUTPATIENT)
Dept: PRIMARY CARE | Facility: CLINIC | Age: 75
End: 2025-03-07
Payer: MEDICARE

## 2025-03-07 ENCOUNTER — HOSPITAL ENCOUNTER (OUTPATIENT)
Dept: RADIOLOGY | Facility: HOSPITAL | Age: 75
Discharge: HOME | End: 2025-03-07
Payer: MEDICARE

## 2025-03-07 VITALS
DIASTOLIC BLOOD PRESSURE: 62 MMHG | BODY MASS INDEX: 28.89 KG/M2 | HEART RATE: 95 BPM | WEIGHT: 153 LBS | SYSTOLIC BLOOD PRESSURE: 130 MMHG | OXYGEN SATURATION: 98 % | HEIGHT: 61 IN

## 2025-03-07 DIAGNOSIS — M05.79 RHEUMATOID ARTHRITIS INVOLVING MULTIPLE SITES WITH POSITIVE RHEUMATOID FACTOR (MULTI): ICD-10-CM

## 2025-03-07 DIAGNOSIS — M75.01 ADHESIVE CAPSULITIS OF RIGHT SHOULDER: ICD-10-CM

## 2025-03-07 DIAGNOSIS — E11.36 TYPE 2 DIABETES MELLITUS WITH DIABETIC CATARACT, WITHOUT LONG-TERM CURRENT USE OF INSULIN: ICD-10-CM

## 2025-03-07 DIAGNOSIS — M43.6 TORTICOLLIS: Primary | ICD-10-CM

## 2025-03-07 DIAGNOSIS — M43.6 TORTICOLLIS: ICD-10-CM

## 2025-03-07 DIAGNOSIS — J43.9 PULMONARY EMPHYSEMA, UNSPECIFIED EMPHYSEMA TYPE (MULTI): ICD-10-CM

## 2025-03-07 DIAGNOSIS — E11.9 TYPE 2 DIABETES MELLITUS WITHOUT COMPLICATION, WITHOUT LONG-TERM CURRENT USE OF INSULIN (MULTI): ICD-10-CM

## 2025-03-07 PROCEDURE — 1123F ACP DISCUSS/DSCN MKR DOCD: CPT

## 2025-03-07 PROCEDURE — 99213 OFFICE O/P EST LOW 20 MIN: CPT

## 2025-03-07 PROCEDURE — 3008F BODY MASS INDEX DOCD: CPT

## 2025-03-07 PROCEDURE — 72050 X-RAY EXAM NECK SPINE 4/5VWS: CPT

## 2025-03-07 PROCEDURE — 73060 X-RAY EXAM OF HUMERUS: CPT | Mod: RT

## 2025-03-07 PROCEDURE — 1036F TOBACCO NON-USER: CPT

## 2025-03-07 PROCEDURE — 1158F ADVNC CARE PLAN TLK DOCD: CPT

## 2025-03-07 PROCEDURE — 1160F RVW MEDS BY RX/DR IN RCRD: CPT

## 2025-03-07 PROCEDURE — 1159F MED LIST DOCD IN RCRD: CPT

## 2025-03-07 PROCEDURE — 3078F DIAST BP <80 MM HG: CPT

## 2025-03-07 PROCEDURE — G2211 COMPLEX E/M VISIT ADD ON: HCPCS

## 2025-03-07 PROCEDURE — 3075F SYST BP GE 130 - 139MM HG: CPT

## 2025-03-07 RX ORDER — METFORMIN HYDROCHLORIDE 500 MG/1
TABLET ORAL
Qty: 300 TABLET | Refills: 3 | Status: SHIPPED | OUTPATIENT
Start: 2025-03-07

## 2025-03-07 RX ORDER — TIZANIDINE 2 MG/1
2 TABLET ORAL EVERY 6 HOURS PRN
Qty: 30 TABLET | Refills: 0 | Status: SHIPPED | OUTPATIENT
Start: 2025-03-07 | End: 2025-03-17

## 2025-03-07 NOTE — PROGRESS NOTES
"Primary Care Provider: Maru Daniel, APRN-CNP    Subjective   Taylor Richard is a 74 y.o. female who presents for Follow-up (Arm hurts/2 yrs ago easter fell on right arm on bone and shoulder/Had a head full of hair and it came out/Taking 3 pills of metformin and needs refill please advise/Has stiff neck heard popping sounds when was sleeping ).    HPI   FUV    Has neck pain; has muscle tightness and pain; woke up a few days ago with the neck pin    Hx of fall 2 years ago on her right arm; has had pain ever since  Has pain deep that feels like it is in the bone  Pain has been worsening; goes from should to elbow  No numbness, no tingling    Hx of OA and RA    Emphysema- has been stable; has albuterol inhaler PRN about once a month    Healthcare POA- Alisha Wade; [phone]    DM II  Lab Results   Component Value Date    HGBA1C 5.6 12/16/2024     CT chest 6 mo follow up 07/17/2025   Mammogram due    Review of Systems  The remainder of the ROS was negative unless otherwise stated in the HPI.       Objective   /62   Pulse 95   Ht 1.549 m (5' 1\")   Wt 69.4 kg (153 lb)   SpO2 98%   BMI 28.91 kg/m²     Physical Exam  Vitals reviewed.   Constitutional:       General: She is not in acute distress.     Appearance: Normal appearance. She is normal weight. She is not ill-appearing, toxic-appearing or diaphoretic.   HENT:      Head: Normocephalic and atraumatic.      Nose: Nose normal.   Eyes:      Conjunctiva/sclera: Conjunctivae normal.   Cardiovascular:      Rate and Rhythm: Normal rate and regular rhythm.      Pulses: Normal pulses.      Heart sounds: Normal heart sounds. No murmur heard.     No friction rub. No gallop.   Pulmonary:      Effort: Pulmonary effort is normal. No respiratory distress.      Breath sounds: Normal breath sounds.   Abdominal:      General: Abdomen is flat. Bowel sounds are normal.      Palpations: Abdomen is soft.   Musculoskeletal:         General: Tenderness present. Normal " range of motion.      Right shoulder: Tenderness present.        Arms:       Cervical back: Spasms and torticollis present.   Skin:     General: Skin is warm and dry.      Capillary Refill: Capillary refill takes less than 2 seconds.   Neurological:      General: No focal deficit present.      Mental Status: She is alert and oriented to person, place, and time. Mental status is at baseline.   Psychiatric:         Mood and Affect: Mood normal.         Behavior: Behavior normal.         Thought Content: Thought content normal.         Judgment: Judgment normal.         Assessment/Plan   Problem List Items Addressed This Visit             ICD-10-CM    Rheumatoid arthritis involving multiple sites with positive rheumatoid factor (Multi)  Stable  Last RF was 14 on 7/25/24; hx of being on Humira and methotrexate M05.79    Adhesive capsulitis of shoulder M75.00    Persisting  Declines steroid taper; PRN alternate between tylenol and ibuprofen sparingly  Relevant Orders    XR humerus right    Referral to Physical Therapy    Diabetes mellitus (Multi) E11.9    Stable  Lab Results   Component Value Date    HGBA1C 5.6 12/16/2024     Relevant Medications    metFORMIN (Glucophage) 500 mg tablet    Other Relevant Orders    Hemoglobin A1C    Albumin-Creatinine Ratio, Urine Random    Hemoglobin A1C    Albumin-Creatinine Ratio, Urine Random    Pulmonary emphysema (Multi)  Stable  PRN inhaler J43.9    Type 2 diabetes mellitus with diabetic cataract, without long-term current use of insulin E11.36    Stable  Lab Results   Component Value Date    HGBA1C 5.6 12/16/2024     Relevant Medications    metFORMIN (Glucophage) 500 mg tablet    Other Relevant Orders    Hemoglobin A1C    Albumin-Creatinine Ratio, Urine Random    Torticollis - Primary M43.6    Persisting  Declines steroid taper; PRN alternate between tylenol and ibuprofen sparingly   Relevant Medications    tiZANidine (Zanaflex) 2 mg tablet    Other Relevant Orders    XR cervical  spine 2-3 views    Referral to Physical Therapy       CT chest 6 mo follow up 07/17/2025   Mammogram due    Follow up in 3 months or sooner if needed

## 2025-03-10 DIAGNOSIS — M43.6 TORTICOLLIS: ICD-10-CM

## 2025-03-10 RX ORDER — TIZANIDINE 2 MG/1
2 TABLET ORAL EVERY 6 HOURS PRN
Qty: 30 TABLET | Refills: 0 | Status: SHIPPED | OUTPATIENT
Start: 2025-03-10 | End: 2025-03-20

## 2025-03-12 ENCOUNTER — TELEPHONE (OUTPATIENT)
Dept: PRIMARY CARE | Facility: CLINIC | Age: 75
End: 2025-03-12
Payer: MEDICARE

## 2025-03-12 DIAGNOSIS — M43.6 TORTICOLLIS: ICD-10-CM

## 2025-03-12 NOTE — TELEPHONE ENCOUNTER
Pt called in and stated with her issues, she doesn't know if she should take tiZANidine (Zanaflex) 2 mg tablet. She is unsure of the side effects. Please advise.

## 2025-03-14 ENCOUNTER — APPOINTMENT (OUTPATIENT)
Dept: PRIMARY CARE | Facility: CLINIC | Age: 75
End: 2025-03-14
Payer: MEDICARE

## 2025-03-17 DIAGNOSIS — E11.36 TYPE 2 DIABETES MELLITUS WITH DIABETIC CATARACT, WITHOUT LONG-TERM CURRENT USE OF INSULIN: Primary | ICD-10-CM

## 2025-03-17 NOTE — PROGRESS NOTES
I called patient and discussed.     Hold off on Zanaflex d/t SE, has some Dizziness- once stopped taking the Zanaflex this has resolved. Neck pain improving slightly with heat and stretching.     Diabetic shoes- help with neuropathy. Placed order.

## 2025-03-18 ENCOUNTER — TELEPHONE (OUTPATIENT)
Dept: PRIMARY CARE | Facility: CLINIC | Age: 75
End: 2025-03-18
Payer: MEDICARE

## 2025-03-18 NOTE — TELEPHONE ENCOUNTER
PT called in and stated that she needs an update on her diabetic shoes and let KYRIE Daniel know that the company will be faxing over paperwork for this patient for the dr to fill out   Status[de-identified] INPATIENT [101]   Type of Bed: Medical [8]   Inpatient Hospitalization Certified Necessary for the Following Reasons: 3.  Patient receiving treatment that can only be provided in an inpatient setting (further clarification in H&P documentation)   Admitting Diagnosis: Dizziness [[id number]]   Admitting Physician: Geronimo Osuna [[id number]]   Attending Physician: Geronimo Osuna [[id number]]   Estimated Length of Stay: 2 Midnights   Discharge Plan[de-identified] Home with Office Follow-up

## 2025-03-24 ENCOUNTER — APPOINTMENT (OUTPATIENT)
Dept: OPHTHALMOLOGY | Facility: CLINIC | Age: 75
End: 2025-03-24
Payer: MEDICARE

## 2025-03-24 ENCOUNTER — OFFICE VISIT (OUTPATIENT)
Dept: PRIMARY CARE | Facility: CLINIC | Age: 75
End: 2025-03-24
Payer: MEDICARE

## 2025-03-24 VITALS
HEIGHT: 61 IN | SYSTOLIC BLOOD PRESSURE: 151 MMHG | WEIGHT: 148 LBS | BODY MASS INDEX: 27.94 KG/M2 | HEART RATE: 89 BPM | OXYGEN SATURATION: 95 % | DIASTOLIC BLOOD PRESSURE: 82 MMHG

## 2025-03-24 DIAGNOSIS — M10.9 GOUT OF BIG TOE: Primary | ICD-10-CM

## 2025-03-24 PROCEDURE — 1159F MED LIST DOCD IN RCRD: CPT

## 2025-03-24 PROCEDURE — 3079F DIAST BP 80-89 MM HG: CPT

## 2025-03-24 PROCEDURE — 3008F BODY MASS INDEX DOCD: CPT

## 2025-03-24 PROCEDURE — 3077F SYST BP >= 140 MM HG: CPT

## 2025-03-24 PROCEDURE — 99213 OFFICE O/P EST LOW 20 MIN: CPT

## 2025-03-24 PROCEDURE — 1123F ACP DISCUSS/DSCN MKR DOCD: CPT

## 2025-03-24 PROCEDURE — 1036F TOBACCO NON-USER: CPT

## 2025-03-24 PROCEDURE — 1160F RVW MEDS BY RX/DR IN RCRD: CPT

## 2025-03-24 RX ORDER — INDOMETHACIN 25 MG/1
25 CAPSULE ORAL 3 TIMES DAILY PRN
Qty: 21 CAPSULE | Refills: 0 | Status: SHIPPED | OUTPATIENT
Start: 2025-03-24 | End: 2025-03-31

## 2025-03-24 RX ORDER — COLCHICINE 0.6 MG/1
TABLET ORAL
Qty: 30 TABLET | Refills: 0 | Status: SHIPPED | OUTPATIENT
Start: 2025-03-24

## 2025-03-24 ASSESSMENT — ENCOUNTER SYMPTOMS
LOSS OF SENSATION IN FEET: 0
DEPRESSION: 0
OCCASIONAL FEELINGS OF UNSTEADINESS: 0

## 2025-03-24 ASSESSMENT — PATIENT HEALTH QUESTIONNAIRE - PHQ9
SUM OF ALL RESPONSES TO PHQ9 QUESTIONS 1 AND 2: 0
2. FEELING DOWN, DEPRESSED OR HOPELESS: NOT AT ALL
1. LITTLE INTEREST OR PLEASURE IN DOING THINGS: NOT AT ALL

## 2025-03-24 NOTE — PROGRESS NOTES
"Primary Care Provider: Maru Daniel, GISELA-KYRIE    Subjective   Taylor Richard is a 74 y.o. female who presents for Follow-up (Gout right foot. ).    HPI   FUV    Right greater toe gout  Severe pain  Warm and erythema  Hurts to walk on her foot  Was taking Advil for the pain  Previous gout episodes: 1 x was June 2021  Alcohol intake: white wine 3 glasses a week  Pork sausage for breakfast daily    Lab Results   Component Value Date    CREATININE 0.80 12/16/2024    BUN 12 12/16/2024     12/16/2024    K 4.1 12/16/2024     12/16/2024    CO2 24 12/16/2024     No chest pain , no SOB      Review of Systems  The remainder of the ROS was negative unless otherwise stated in the HPI.       Objective   /82   Pulse 89   Ht 1.549 m (5' 1\")   Wt 67.1 kg (148 lb)   SpO2 95%   BMI 27.96 kg/m²     Physical Exam  Constitutional:       General: She is not in acute distress.     Appearance: Normal appearance. She is normal weight. She is not ill-appearing, toxic-appearing or diaphoretic.   Cardiovascular:      Rate and Rhythm: Normal rate.      Pulses: Normal pulses.   Pulmonary:      Effort: Pulmonary effort is normal.      Breath sounds: Normal breath sounds.   Feet:      Right foot:      Skin integrity: Erythema (right greater toe) and warmth present.   Neurological:      Mental Status: She is alert.         Assessment/Plan   Problem List Items Addressed This Visit    None  Visit Diagnoses         Codes    Gout of big toe    -  Primary M10.9    Relevant Medications    colchicine 0.6 mg tablet    indomethacin (Indocin) 25 mg capsule        Cut out alcohol and red meat  Increase water intake      Follow up if symptoms are worsening or persisting  "

## 2025-03-24 NOTE — PATIENT INSTRUCTIONS
Take colchicine 1.2 mg followed by 0.6 mg after 1 hour today (3/24/25). Then take 0.6 mg twice daily until flare resolves.

## 2025-03-25 ENCOUNTER — TELEPHONE (OUTPATIENT)
Dept: PRIMARY CARE | Facility: CLINIC | Age: 75
End: 2025-03-25
Payer: MEDICARE

## 2025-03-25 DIAGNOSIS — M10.9 GOUT OF BIG TOE: Primary | ICD-10-CM

## 2025-03-31 NOTE — TELEPHONE ENCOUNTER
Patient ask for a call back from [facility] about medication that made her sick please give patient a call back [phone]

## 2025-04-01 ENCOUNTER — TELEPHONE (OUTPATIENT)
Dept: PRIMARY CARE | Facility: CLINIC | Age: 75
End: 2025-04-01
Payer: MEDICARE

## 2025-04-01 DIAGNOSIS — M10.9 GOUT OF BIG TOE: ICD-10-CM

## 2025-04-01 RX ORDER — KETOROLAC TROMETHAMINE 10 MG/1
10 TABLET, FILM COATED ORAL EVERY 6 HOURS PRN
Qty: 20 TABLET | Refills: 0 | Status: SHIPPED | OUTPATIENT
Start: 2025-04-01 | End: 2025-04-06

## 2025-04-01 RX ORDER — KETOROLAC TROMETHAMINE 10 MG/1
10 TABLET, FILM COATED ORAL EVERY 6 HOURS PRN
Qty: 20 TABLET | Refills: 0 | Status: SHIPPED | OUTPATIENT
Start: 2025-04-01 | End: 2025-04-01 | Stop reason: SDUPTHER

## 2025-04-01 RX ORDER — OMEPRAZOLE 20 MG/1
20 TABLET, DELAYED RELEASE ORAL
COMMUNITY

## 2025-04-01 NOTE — TELEPHONE ENCOUNTER
[facility] to [facility]adol [facility]

## 2025-04-01 NOTE — TELEPHONE ENCOUNTER
I called patient back and spoke with her over the phone.   She took indomethacin & colchicine for 2 days then stopped, due to her developing headache and diarrhea from colchicine and reflux, stomach pain and nausea from the indomethacin. Reflux was worse with eating. I added indomethacin and colchicine to her intolerance list. I would like her to take the omeprazole 20mg capsule that she has at home once daily for 14 days. Follow up with me or call if symptoms are still persisting.     Her gout is improving, able to wear shoe now, but still having some symptoms redness and swelling and severe pain is persisting. She would like rx of Toradol for the pain as she states she has taken this many times and it has helped with her pain and that she does not have side effects from this. She wants this sent via mail order and not local pharmacy. Cautioned her on the potential side effects, including GI side effects including ulcer and gi bleed and the potential cardiovascular thrombotic events such as myocardial infarction and stroke.  We had previously discussed steroids as a treatment option at her office visit 3/24/25 and I discussed this as a treatment option again, but she declines this again.    She wanted visit encounter 3/24/25 charting to be updated (correction: eats pork sausage maybe once every 1-2 weeks; updated this in visit 3/24/25; changed from daily). Completed/ made this update.

## 2025-04-14 ENCOUNTER — TELEPHONE (OUTPATIENT)
Dept: PRIMARY CARE | Facility: CLINIC | Age: 75
End: 2025-04-14
Payer: MEDICARE

## 2025-04-14 DIAGNOSIS — L65.9 HAIR LOSS: Primary | ICD-10-CM

## 2025-04-22 ENCOUNTER — TELEPHONE (OUTPATIENT)
Dept: OPHTHALMOLOGY | Facility: CLINIC | Age: 75
End: 2025-04-22
Payer: MEDICARE

## 2025-04-22 NOTE — TELEPHONE ENCOUNTER
Pt called, sts still having off/on film OS since last apt in Feb 2025. Pt sts when she blinks it clears up, has been using art tears 2-3 times a day OU. Pt sts no sudden change in VA OS, no pain, no redness OS. Explain to pt, sounds like dryness of her tear film, try using art tears 4 times a day constantly and see if improvement. Pt to call back in 3-4 days if no improvement. Pt expressed verbal understanding of all information given.

## 2025-05-05 ENCOUNTER — APPOINTMENT (OUTPATIENT)
Facility: CLINIC | Age: 75
End: 2025-05-05
Payer: MEDICARE

## 2025-05-05 ENCOUNTER — OFFICE VISIT (OUTPATIENT)
Dept: OPHTHALMOLOGY | Facility: CLINIC | Age: 75
End: 2025-05-05
Payer: MEDICARE

## 2025-05-05 VITALS
WEIGHT: 147 LBS | HEIGHT: 61 IN | SYSTOLIC BLOOD PRESSURE: 132 MMHG | BODY MASS INDEX: 27.75 KG/M2 | DIASTOLIC BLOOD PRESSURE: 75 MMHG

## 2025-05-05 DIAGNOSIS — Z96.1 PSEUDOPHAKIA: ICD-10-CM

## 2025-05-05 DIAGNOSIS — H40.003 GLAUCOMA SUSPECT OF BOTH EYES: ICD-10-CM

## 2025-05-05 DIAGNOSIS — H52.4 PRESBYOPIA: ICD-10-CM

## 2025-05-05 DIAGNOSIS — H02.881 MEIBOMIAN GLAND DYSFUNCTION (MGD) OF UPPER EYELIDS OF BOTH EYES: ICD-10-CM

## 2025-05-05 DIAGNOSIS — H10.13 ALLERGIC CONJUNCTIVITIS OF BOTH EYES: Primary | ICD-10-CM

## 2025-05-05 DIAGNOSIS — H01.001 BLEPHARITIS OF UPPER EYELIDS OF BOTH EYES, UNSPECIFIED TYPE: ICD-10-CM

## 2025-05-05 DIAGNOSIS — H02.884 MEIBOMIAN GLAND DYSFUNCTION (MGD) OF UPPER EYELIDS OF BOTH EYES: ICD-10-CM

## 2025-05-05 DIAGNOSIS — H52.203 ASTIGMATISM OF BOTH EYES, UNSPECIFIED TYPE: ICD-10-CM

## 2025-05-05 DIAGNOSIS — L90.0 LICHEN SCLEROSUS: Primary | ICD-10-CM

## 2025-05-05 DIAGNOSIS — H43.811 PVD (POSTERIOR VITREOUS DETACHMENT), RIGHT EYE: ICD-10-CM

## 2025-05-05 DIAGNOSIS — H35.033 HYPERTENSIVE RETINOPATHY OF BOTH EYES: ICD-10-CM

## 2025-05-05 DIAGNOSIS — H52.13 MYOPIA OF BOTH EYES: ICD-10-CM

## 2025-05-05 DIAGNOSIS — R73.03 PREDIABETES: ICD-10-CM

## 2025-05-05 DIAGNOSIS — H01.004 BLEPHARITIS OF UPPER EYELIDS OF BOTH EYES, UNSPECIFIED TYPE: ICD-10-CM

## 2025-05-05 PROCEDURE — 3078F DIAST BP <80 MM HG: CPT | Performed by: OBSTETRICS & GYNECOLOGY

## 2025-05-05 PROCEDURE — 3075F SYST BP GE 130 - 139MM HG: CPT | Performed by: OBSTETRICS & GYNECOLOGY

## 2025-05-05 PROCEDURE — 1159F MED LIST DOCD IN RCRD: CPT | Performed by: OBSTETRICS & GYNECOLOGY

## 2025-05-05 PROCEDURE — 1123F ACP DISCUSS/DSCN MKR DOCD: CPT | Performed by: OBSTETRICS & GYNECOLOGY

## 2025-05-05 PROCEDURE — 1126F AMNT PAIN NOTED NONE PRSNT: CPT | Performed by: OBSTETRICS & GYNECOLOGY

## 2025-05-05 PROCEDURE — 3008F BODY MASS INDEX DOCD: CPT | Performed by: OBSTETRICS & GYNECOLOGY

## 2025-05-05 PROCEDURE — 1036F TOBACCO NON-USER: CPT | Performed by: OBSTETRICS & GYNECOLOGY

## 2025-05-05 PROCEDURE — 99204 OFFICE O/P NEW MOD 45 MIN: CPT | Performed by: OBSTETRICS & GYNECOLOGY

## 2025-05-05 PROCEDURE — 1160F RVW MEDS BY RX/DR IN RCRD: CPT | Performed by: OBSTETRICS & GYNECOLOGY

## 2025-05-05 PROCEDURE — 99213 OFFICE O/P EST LOW 20 MIN: CPT | Performed by: OPHTHALMOLOGY

## 2025-05-05 RX ORDER — CLOBETASOL PROPIONATE 0.5 MG/G
OINTMENT TOPICAL 2 TIMES DAILY
Qty: 45 G | Refills: 1 | Status: SHIPPED | OUTPATIENT
Start: 2025-05-05

## 2025-05-05 RX ORDER — AZELASTINE HYDROCHLORIDE 0.5 MG/ML
SOLUTION/ DROPS OPHTHALMIC
Qty: 6 ML | Refills: 6 | Status: SHIPPED | OUTPATIENT
Start: 2025-05-05

## 2025-05-05 ASSESSMENT — ENCOUNTER SYMPTOMS
ENDOCRINE NEGATIVE: 0
DYSURIA: 0
SHORTNESS OF BREATH: 0
GASTROINTESTINAL NEGATIVE: 0
EYES NEGATIVE: 1
ABDOMINAL DISTENTION: 0
BACK PAIN: 0
NEUROLOGICAL NEGATIVE: 0
APPETITE CHANGE: 0
MUSCULOSKELETAL NEGATIVE: 0
FLANK PAIN: 0
ABDOMINAL PAIN: 0
RESPIRATORY NEGATIVE: 0
ALLERGIC/IMMUNOLOGIC NEGATIVE: 0
FEVER: 0
BLOOD IN STOOL: 0
FREQUENCY: 0
COLOR CHANGE: 0
CHILLS: 0
NAUSEA: 0
VOMITING: 0
SLEEP DISTURBANCE: 0
UNEXPECTED WEIGHT CHANGE: 0
DIARRHEA: 0
CARDIOVASCULAR NEGATIVE: 0
CONSTIPATION: 0
CONSTITUTIONAL NEGATIVE: 0
PSYCHIATRIC NEGATIVE: 0
FATIGUE: 0
HEMATOLOGIC/LYMPHATIC NEGATIVE: 0
HEMATURIA: 0

## 2025-05-05 ASSESSMENT — PACHYMETRY
OS_CT(UM): 520
OD_CT(UM): 510

## 2025-05-05 ASSESSMENT — REFRACTION_WEARINGRX
OS_SPHERE: -2.50
OS_ADD: +2.50
OD_SPHERE: -2.25
OD_AXIS: 090
OD_ADD: +2.50
OS_AXIS: 080
OS_CYLINDER: -0.50
OD_CYLINDER: -0.50

## 2025-05-05 ASSESSMENT — TONOMETRY
IOP_METHOD: GOLDMANN APPLANATION
OD_IOP_MMHG: 15
OS_IOP_MMHG: 15

## 2025-05-05 ASSESSMENT — CUP TO DISC RATIO: OS_RATIO: 0.25

## 2025-05-05 ASSESSMENT — VISUAL ACUITY
OS_CC: 20/20
OD_CC: 20/20
METHOD: SNELLEN - LINEAR
CORRECTION_TYPE: GLASSES

## 2025-05-05 ASSESSMENT — PATIENT HEALTH QUESTIONNAIRE - PHQ9
1. LITTLE INTEREST OR PLEASURE IN DOING THINGS: NOT AT ALL
SUM OF ALL RESPONSES TO PHQ9 QUESTIONS 1 AND 2: 0
2. FEELING DOWN, DEPRESSED OR HOPELESS: NOT AT ALL

## 2025-05-05 ASSESSMENT — PAIN SCALES - GENERAL: PAINLEVEL_OUTOF10: 0-NO PAIN

## 2025-05-05 NOTE — PROGRESS NOTES
Allergic conjunctivitis, bilateral  Meibomian gland dysfunction (MGD)H02.89  Blepharitis of both eyesH01.003  -FBS, irritation, redness, itching OS>OD  -History of eyelids and eyebrows itchy like there are mites in them - likely some dryness and blepharitis - history of using erythromycin ophthalmic ointment.  -Sleeps on side, R>L  -Continue warm compresses 1-2x/day  -Baby shampoo lid wash daily or iVizia wipes (samples given) - feels that these don't help  -Using artificial tears once a day, advised to increase to 3-4x/day  -Will Rx: Azelastine OU BID prn itching/allergies (has not used yet). Eyes are itchy mainly in the fall - possible allergy component.   -Can consider Xdemvy as needed, or Restasis, Cequa, Vevye, Xiidra, Miebo, Tyrvaya for keratoconjunctivitis sicca. Verkazia for vernal conjunctivitis. Punctal plugs.   -F/u 8/2025 as scheduled - refract OU, dilate OU for PCO check, OCT RNFL.     Pseudophakia of left eyeZ96.1 - 11/21/24 - Complex with Trypan Blue  -Doing well. Good vision. IOP good. Off all gtts.   -Continue artificial tears PRN  -F/u 8/2025 as scheduled - refract OU, dilate OU for PCO check, OCT RNFL.     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 OD: Thin S/I, bord T. OS: Thin S/T. Bord I. 61 OU. Stable from 8/14/23.  -HVF 24-2 (2/12/24) - OD: 13/14FL 3%FP. Scattered nasal, but WNL. OS: 5/16FL 1%FN. Inferonasal depression. Similar to 5/21/18.   -Patient had stopped latanoprost 9/2022 due to foreign body sensation, burning, irritation. Had been taking latanoprost OU since 2016 and not experienced symptoms before. Recommended changing to timolol 0.5% OU qAM, but patient declined to take due to potential side effects (cardiac). Restarted latanoprost OU QHS 12/2022.  -Plan: Discussed importance of compliance with drops and office visits due to risk of permanent vision loss with glaucoma.   Patient using latanoprost OU nightly without issue - reports good compliance. Plan to repeat OCT RNFL in 6 months.     PVD (posterior vitreous detachment), right eyeH43.811  -Stable, continue to monitor.     Pseudophakia of right eyeZ96.1 - 12/7/17  -Doing well. Good vision. IOP good.     Pre-zvzkyiidY79.03  Hypertensive retinopathy of both eyesH35.033  -OCT macula (9/23/24) - SS: 4/10 OD and 4/10 OS. Normal thickness and contour OU. Intact IS-OS OU. No edema OU. 255/247. Stable from 12/19/22.   -HbA1c= 6.8 (7/24/24 POCT). On Metformin. No diabetic retinopathy seen. Continue close monitoring of blood glucose, blood pressure, and cholesterol. Plan for annual dilated eye exam. Advised smoking cessation.     Myopia of both eyes with astigmatism and vebpgybegsT83.13  -Rx given and confirmation signature obtained 2/7/25  -F/u 8/2025 as scheduled - refract OU, dilate OU for PCO check, OCT RNFL.         No history of refractive surgery.

## 2025-05-05 NOTE — PROGRESS NOTES
Taylor Richard is a 74 y.o. No obstetric history on file. here for vulvar itching    Pt reports vulvar itching for the last 6 months. Has noticed some skin changes (light skin) in the last 1-2 months.   Denies bleeding or drainage.   Has not had this problem previously.   Not currently sexually active.  Cannot remember age of menopause.   Denies using HRT.     Was using clotrimazole/betamethasone topically (from different provider) but that was not very helpful.      Past medical and surgical history reviewed.     Obstetric History  OB History   No obstetric history on file.        Past Medical History  She has a past medical history of Acute candidiasis of vulva and vagina (01/06/2017), Age-related nuclear cataract, left eye (05/21/2018), Age-related nuclear cataract, right eye (01/10/2018), Cellulitis of left lower limb (05/27/2021), Cholelithiasis, Chronic viral hepatitis C (Multi) (08/14/2017), Combined forms of age-related cataract, bilateral (12/13/2017), Contusion of right eyelid and periocular area, initial encounter (01/10/2018), Coronary artery disease, Cortical age-related cataract, left eye (05/21/2018), Cortical age-related cataract, right eye (01/10/2018), Edema of larynx (12/30/2016), Emphysema lung (Multi), Encounter for immunization (05/26/2021), GERD (gastroesophageal reflux disease), Hepatitis C, Hypertensive retinopathy, bilateral (12/19/2022), Meibomian gland dysfunction of unspecified eye, unspecified eyelid (12/19/2022), Myopia, bilateral (12/19/2022), Ocular hypertension, unspecified eye (01/15/2016), Ocular pain, right eye (01/10/2018), Other conditions influencing health status (05/21/2018), Other pulmonary collapse (06/28/2016), Other specified noninflammatory disorders of vulva and perineum (12/15/2014), Pain in right shoulder (04/06/2016), Pain in unspecified ankle and joints of unspecified foot (10/27/2015), Personal history of nicotine dependence (01/04/2023), Personal history of  other diseases of the digestive system (08/17/2017), Personal history of other diseases of the respiratory system (05/03/2018), Personal history of other infectious and parasitic diseases (09/15/2021), Personal history of other infectious and parasitic diseases, Personal history of other specified conditions (08/14/2017), Personal history of other specified conditions (03/27/2018), Prediabetes (12/19/2022), Preglaucoma, unspecified, bilateral (12/19/2022), Presence of intraocular lens (02/15/2018), Presence of intraocular lens (12/19/2022), Rheumatoid arthritis, Right upper quadrant pain (10/14/2021), Right upper quadrant pain (10/14/2021), Strain of unspecified muscle, fascia and tendon at shoulder and upper arm level, right arm, initial encounter (04/06/2016), Systemic lupus erythematosus, unspecified (12/19/2022), Systemic lupus erythematosus, unspecified (11/29/2017), Type 2 diabetes mellitus without complications (01/04/2023), Type 2 diabetes mellitus without complications (12/13/2017), Type 2 diabetes mellitus without complications (10/31/2017), Unspecified symptoms and signs involving the genitourinary system (09/15/2021), and Unspecified visual field defects (04/27/2016).    Surgical History  She has a past surgical history that includes Total abdominal hysterectomy w/ bilateral salpingoophorectomy (11/07/2014); Other surgical history (10/14/2021); Other surgical history (10/14/2021); Other surgical history (10/14/2021); Other surgical history (03/17/2022); CT angio coronary art with heartflow if score >30% (02/18/2019); Cholecystectomy; Cataract extraction w/  intraocular lens implant (Right, 12/07/2017); and Cataract extraction w/  intraocular lens implant (Left, 11/21/2024).     Social History  She reports that she quit smoking about 6 years ago. Her smoking use included cigarettes. She does not have any smokeless tobacco history on file. She reports current alcohol use. She reports that she does not use  "drugs.    Family History  Family History[1]      /75 (BP Location: Left arm, Patient Position: Sitting)   Ht 1.549 m (5' 1\")   Wt 66.7 kg (147 lb)   BMI 27.78 kg/m²   No LMP recorded. Patient is postmenopausal.    Review of Systems   Constitutional:  Negative for appetite change, chills, fatigue, fever and unexpected weight change.   Respiratory:  Negative for shortness of breath.    Cardiovascular:  Negative for chest pain.   Gastrointestinal:  Negative for abdominal distention, abdominal pain, blood in stool, constipation, diarrhea, nausea and vomiting.   Endocrine: Negative for cold intolerance and heat intolerance.   Genitourinary:  Negative for dyspareunia, dysuria, flank pain, frequency, genital sores, hematuria, menstrual problem, pelvic pain, urgency, vaginal bleeding, vaginal discharge and vaginal pain.   Musculoskeletal:  Negative for back pain.   Skin:  Negative for color change.   Psychiatric/Behavioral:  Negative for sleep disturbance.        Physical Exam  Constitutional:       Appearance: Normal appearance.   Genitourinary:     Vagina: Normal.      Cervix: Normal.          Comments: 2 areas of hypopigmented skin outside clitoral murray   Skin:     General: Skin is warm and dry.   Neurological:      Mental Status: She is alert.   Psychiatric:         Mood and Affect: Mood normal.         Behavior: Behavior normal.           Assessment and Plan:    Vulvar skin changes and itching  Likely lichen sclerosus  Offered biopsy today for definitive management vs treatment with steroid ointment with short term follow up   Pt prefers medical management with follow up  Will biopsy if not improved or any concerning changes  Clobetasol BID x 3-4 weeks  Follow up 3-4 weeks          [1]   Family History  Problem Relation Name Age of Onset    COPD Mother      Colon cancer Mother      Prostate cancer Father      Multiple myeloma Sister      Hodgkin's lymphoma Brother      Breast cancer Daughter  35     "

## 2025-05-06 ENCOUNTER — APPOINTMENT (OUTPATIENT)
Dept: PRIMARY CARE | Facility: CLINIC | Age: 75
End: 2025-05-06
Payer: MEDICARE

## 2025-05-06 VITALS
DIASTOLIC BLOOD PRESSURE: 62 MMHG | BODY MASS INDEX: 28.13 KG/M2 | HEART RATE: 75 BPM | HEIGHT: 61 IN | SYSTOLIC BLOOD PRESSURE: 134 MMHG | WEIGHT: 149 LBS | OXYGEN SATURATION: 95 %

## 2025-05-06 DIAGNOSIS — N39.3 STRESS INCONTINENCE OF URINE: ICD-10-CM

## 2025-05-06 DIAGNOSIS — E11.9 TYPE 2 DIABETES MELLITUS WITHOUT COMPLICATION, WITHOUT LONG-TERM CURRENT USE OF INSULIN: ICD-10-CM

## 2025-05-06 DIAGNOSIS — D64.9 ANEMIA, UNSPECIFIED TYPE: ICD-10-CM

## 2025-05-06 DIAGNOSIS — J02.9 SORE THROAT: Primary | ICD-10-CM

## 2025-05-06 DIAGNOSIS — Z12.31 SCREENING MAMMOGRAM FOR BREAST CANCER: ICD-10-CM

## 2025-05-06 LAB — POC RAPID STREP: NEGATIVE

## 2025-05-06 PROCEDURE — G2211 COMPLEX E/M VISIT ADD ON: HCPCS

## 2025-05-06 PROCEDURE — 3008F BODY MASS INDEX DOCD: CPT

## 2025-05-06 PROCEDURE — 87880 STREP A ASSAY W/OPTIC: CPT

## 2025-05-06 PROCEDURE — 1036F TOBACCO NON-USER: CPT

## 2025-05-06 PROCEDURE — 99213 OFFICE O/P EST LOW 20 MIN: CPT

## 2025-05-06 PROCEDURE — 1160F RVW MEDS BY RX/DR IN RCRD: CPT

## 2025-05-06 PROCEDURE — 3078F DIAST BP <80 MM HG: CPT

## 2025-05-06 PROCEDURE — 3075F SYST BP GE 130 - 139MM HG: CPT

## 2025-05-06 PROCEDURE — 1159F MED LIST DOCD IN RCRD: CPT

## 2025-05-06 ASSESSMENT — PATIENT HEALTH QUESTIONNAIRE - PHQ9
2. FEELING DOWN, DEPRESSED OR HOPELESS: NOT AT ALL
1. LITTLE INTEREST OR PLEASURE IN DOING THINGS: NOT AT ALL
SUM OF ALL RESPONSES TO PHQ9 QUESTIONS 1 AND 2: 0

## 2025-05-06 ASSESSMENT — ENCOUNTER SYMPTOMS
OCCASIONAL FEELINGS OF UNSTEADINESS: 0
LOSS OF SENSATION IN FEET: 0
DEPRESSION: 0

## 2025-05-06 NOTE — PROGRESS NOTES
"Primary Care Provider: Maru Daniel, GISELA-CNP    Subjective   Taylor Richard is a 74 y.o. female who presents for Follow-up (Not feeling well, sore throat, red dot on roof of mouth. Not sore anymore, dry cough, ears, she cough or sneeze and urinates. ).    HPI   Sick visit    No longer having these symptoms, but for 1 week she was having hard cough and sneezing- having urinary leakage with this, very raw throat, temperature of 101 for 2 days, having pain with ears, nose, and throat  Not having any of these symptoms today- feeling better since Saturday-- just has a slight cough when she goes to bed now      DM II  On metformin 500mg daily  Lab Results   Component Value Date     HGBA1C 5.6 12/16/2024   On zetia 10mg daily for cholesterol    Chronic anemia  Dating back to 2018  Denies any dark tarry stool or blood in the stool  Normal vitamin B12, iron and TIBC, ferritin    Mammogram due    6mo repeat CT chest scheduled for 07/17/2025     Review of Systems  The remainder of the ROS was negative unless otherwise stated in the HPI.       Objective   /62   Pulse 75   Ht 1.549 m (5' 1\")   Wt 67.6 kg (149 lb)   SpO2 95%   BMI 28.15 kg/m²     Physical Exam  Vitals reviewed.   Constitutional:       General: She is not in acute distress.     Appearance: Normal appearance. She is normal weight. She is not ill-appearing, toxic-appearing or diaphoretic.   HENT:      Head: Normocephalic and atraumatic.      Right Ear: Tympanic membrane, ear canal and external ear normal. There is no impacted cerumen.      Left Ear: Tympanic membrane, ear canal and external ear normal. There is no impacted cerumen.      Mouth/Throat:      Mouth: Mucous membranes are moist.      Pharynx: No oropharyngeal exudate or posterior oropharyngeal erythema.      Comments: PND  Eyes:      Conjunctiva/sclera: Conjunctivae normal.   Cardiovascular:      Rate and Rhythm: Normal rate and regular rhythm.      Pulses: Normal pulses.      Heart " sounds: Normal heart sounds. No murmur heard.     No friction rub. No gallop.   Pulmonary:      Effort: Pulmonary effort is normal. No respiratory distress.      Breath sounds: Normal breath sounds.   Abdominal:      General: Abdomen is flat. Bowel sounds are normal.      Palpations: Abdomen is soft.   Musculoskeletal:         General: Normal range of motion.      Cervical back: Normal range of motion and neck supple.   Lymphadenopathy:      Cervical: No cervical adenopathy.   Skin:     General: Skin is warm and dry.   Neurological:      General: No focal deficit present.      Mental Status: She is alert and oriented to person, place, and time. Mental status is at baseline.   Psychiatric:         Mood and Affect: Mood normal.         Behavior: Behavior normal.         Thought Content: Thought content normal.         Judgment: Judgment normal.         Assessment/Plan   Problem List Items Addressed This Visit           ICD-10-CM    Anemia D64.9    Stable  Dating back to 2018  Denies any dark tarry stool or blood in the stool  Normal vitamin B12, iron and TIBC, ferritin  Relevant Orders    CBC and Auto Differential    Referral To Hematology and Oncology    Diabetes mellitus (Multi) E11.9    Stable  Blood work due  On metformin 500mg daily  Lab Results   Component Value Date     HGBA1C 5.6 12/16/2024   On zetia 10mg daily for cholesterol  Relevant Orders    Hemoglobin A1C    Albumin-Creatinine Ratio, Urine Random    Lipid Panel    Comprehensive metabolic panel    CBC and Auto Differential    Urinalysis with Reflex Microscopic    Stress incontinence of urine N39.3    New  With coughing or sneezing  Check UA  Relevant Orders    Referral to Physical Therapy     Other Visit Diagnoses         Codes      Sore throat    -  Primary J02.9    Resolving, negative strep testing  Slight PND- most likely r/t seasonal allergies  Relevant Orders    POCT Rapid Strep A manually resulted (Completed)      Screening mammogram for breast  cancer     Z12.31    Relevant Orders    BI mammo bilateral screening tomosynthesis            Mammogram due    6mo repeat CT chest scheduled for 07/17/2025       Follow up with yearly wellness exam and as needed

## 2025-05-09 NOTE — PROGRESS NOTES
Correction:  Encounter for screening for malignant neoplasm of lung in former smoker who quit in past 15 years with 30 pack year history or greater [Z12.2, Z87.891]     Patient started smoking cigarette at 15 or 16 years old (Started smoking cigarettes in about 1965- quit February 2019). Smoked 1-1.5 PPD.

## 2025-05-10 LAB
ALBUMIN SERPL-MCNC: 4.6 G/DL (ref 3.6–5.1)
ALBUMIN/CREAT UR: NORMAL
ALP SERPL-CCNC: 91 U/L (ref 37–153)
ALT SERPL-CCNC: 7 U/L (ref 6–29)
ANION GAP SERPL CALCULATED.4IONS-SCNC: 12 MMOL/L (CALC) (ref 7–17)
APPEARANCE UR: CLEAR
AST SERPL-CCNC: 13 U/L (ref 10–35)
BACTERIA #/AREA URNS HPF: ABNORMAL /HPF
BASOPHILS # BLD AUTO: 81 CELLS/UL (ref 0–200)
BASOPHILS NFR BLD AUTO: 1 %
BILIRUB SERPL-MCNC: 0.3 MG/DL (ref 0.2–1.2)
BILIRUB UR QL STRIP: NEGATIVE
BUN SERPL-MCNC: 16 MG/DL (ref 7–25)
CALCIUM SERPL-MCNC: 9.9 MG/DL (ref 8.6–10.4)
CHLORIDE SERPL-SCNC: 102 MMOL/L (ref 98–110)
CHOLEST SERPL-MCNC: 171 MG/DL
CHOLEST/HDLC SERPL: 3.6 (CALC)
CO2 SERPL-SCNC: 25 MMOL/L (ref 20–32)
COLOR UR: YELLOW
CREAT SERPL-MCNC: 0.8 MG/DL (ref 0.6–1)
CREAT UR-MCNC: NORMAL MG/DL
EGFRCR SERPLBLD CKD-EPI 2021: 77 ML/MIN/1.73M2
EOSINOPHIL # BLD AUTO: 292 CELLS/UL (ref 15–500)
EOSINOPHIL NFR BLD AUTO: 3.6 %
ERYTHROCYTE [DISTWIDTH] IN BLOOD BY AUTOMATED COUNT: 14.9 % (ref 11–15)
EST. AVERAGE GLUCOSE BLD GHB EST-MCNC: 126 MG/DL
EST. AVERAGE GLUCOSE BLD GHB EST-SCNC: 7 MMOL/L
GLUCOSE SERPL-MCNC: 83 MG/DL (ref 65–139)
GLUCOSE UR QL STRIP: NEGATIVE
HBA1C MFR BLD: 6 %
HCT VFR BLD AUTO: 38.1 % (ref 35–45)
HDLC SERPL-MCNC: 47 MG/DL
HGB BLD-MCNC: 12.4 G/DL (ref 11.7–15.5)
HGB UR QL STRIP: NEGATIVE
HYALINE CASTS #/AREA URNS LPF: ABNORMAL /LPF
KETONES UR QL STRIP: NEGATIVE
LDLC SERPL CALC-MCNC: 104 MG/DL (CALC)
LEUKOCYTE ESTERASE UR QL STRIP: ABNORMAL
LYMPHOCYTES # BLD AUTO: 4358 CELLS/UL (ref 850–3900)
LYMPHOCYTES NFR BLD AUTO: 53.8 %
MCH RBC QN AUTO: 24.9 PG (ref 27–33)
MCHC RBC AUTO-ENTMCNC: 32.5 G/DL (ref 32–36)
MCV RBC AUTO: 76.7 FL (ref 80–100)
MICROALBUMIN UR-MCNC: NORMAL
MONOCYTES # BLD AUTO: 527 CELLS/UL (ref 200–950)
MONOCYTES NFR BLD AUTO: 6.5 %
NEUTROPHILS # BLD AUTO: 2843 CELLS/UL (ref 1500–7800)
NEUTROPHILS NFR BLD AUTO: 35.1 %
NITRITE UR QL STRIP: NEGATIVE
NONHDLC SERPL-MCNC: 124 MG/DL (CALC)
PH UR STRIP: <5 [PH] (ref 5–8)
PLATELET # BLD AUTO: 310 THOUSAND/UL (ref 140–400)
PMV BLD REES-ECKER: 12 FL (ref 7.5–12.5)
POTASSIUM SERPL-SCNC: 4.8 MMOL/L (ref 3.5–5.3)
PROT SERPL-MCNC: 8.2 G/DL (ref 6.1–8.1)
PROT UR QL STRIP: ABNORMAL
RBC # BLD AUTO: 4.97 MILLION/UL (ref 3.8–5.1)
RBC #/AREA URNS HPF: ABNORMAL /HPF
SERVICE CMNT-IMP: ABNORMAL
SODIUM SERPL-SCNC: 139 MMOL/L (ref 135–146)
SP GR UR STRIP: 1.02 (ref 1–1.03)
SQUAMOUS #/AREA URNS HPF: ABNORMAL /HPF
TRIGL SERPL-MCNC: 103 MG/DL
WBC # BLD AUTO: 8.1 THOUSAND/UL (ref 3.8–10.8)
WBC #/AREA URNS HPF: ABNORMAL /HPF

## 2025-05-12 LAB
ALBUMIN SERPL-MCNC: 4.6 G/DL (ref 3.6–5.1)
ALBUMIN/CREAT UR: 70 MG/G CREAT
ALP SERPL-CCNC: 91 U/L (ref 37–153)
ALT SERPL-CCNC: 7 U/L (ref 6–29)
ANION GAP SERPL CALCULATED.4IONS-SCNC: 12 MMOL/L (CALC) (ref 7–17)
APPEARANCE UR: CLEAR
AST SERPL-CCNC: 13 U/L (ref 10–35)
BACTERIA #/AREA URNS HPF: ABNORMAL /HPF
BASOPHILS # BLD AUTO: 81 CELLS/UL (ref 0–200)
BASOPHILS NFR BLD AUTO: 1 %
BILIRUB SERPL-MCNC: 0.3 MG/DL (ref 0.2–1.2)
BILIRUB UR QL STRIP: NEGATIVE
BUN SERPL-MCNC: 16 MG/DL (ref 7–25)
CALCIUM SERPL-MCNC: 9.9 MG/DL (ref 8.6–10.4)
CHLORIDE SERPL-SCNC: 102 MMOL/L (ref 98–110)
CHOLEST SERPL-MCNC: 171 MG/DL
CHOLEST/HDLC SERPL: 3.6 (CALC)
CO2 SERPL-SCNC: 25 MMOL/L (ref 20–32)
COLOR UR: YELLOW
CREAT SERPL-MCNC: 0.8 MG/DL (ref 0.6–1)
CREAT UR-MCNC: 151 MG/DL (ref 20–275)
EGFRCR SERPLBLD CKD-EPI 2021: 77 ML/MIN/1.73M2
EOSINOPHIL # BLD AUTO: 292 CELLS/UL (ref 15–500)
EOSINOPHIL NFR BLD AUTO: 3.6 %
ERYTHROCYTE [DISTWIDTH] IN BLOOD BY AUTOMATED COUNT: 14.9 % (ref 11–15)
EST. AVERAGE GLUCOSE BLD GHB EST-MCNC: 126 MG/DL
EST. AVERAGE GLUCOSE BLD GHB EST-SCNC: 7 MMOL/L
GLUCOSE SERPL-MCNC: 83 MG/DL (ref 65–139)
GLUCOSE UR QL STRIP: NEGATIVE
HBA1C MFR BLD: 6 %
HCT VFR BLD AUTO: 38.1 % (ref 35–45)
HDLC SERPL-MCNC: 47 MG/DL
HGB BLD-MCNC: 12.4 G/DL (ref 11.7–15.5)
HGB UR QL STRIP: NEGATIVE
HYALINE CASTS #/AREA URNS LPF: ABNORMAL /LPF
KETONES UR QL STRIP: NEGATIVE
LDLC SERPL CALC-MCNC: 104 MG/DL (CALC)
LEUKOCYTE ESTERASE UR QL STRIP: ABNORMAL
LYMPHOCYTES # BLD AUTO: 4358 CELLS/UL (ref 850–3900)
LYMPHOCYTES NFR BLD AUTO: 53.8 %
MCH RBC QN AUTO: 24.9 PG (ref 27–33)
MCHC RBC AUTO-ENTMCNC: 32.5 G/DL (ref 32–36)
MCV RBC AUTO: 76.7 FL (ref 80–100)
MICROALBUMIN UR-MCNC: 10.5 MG/DL
MONOCYTES # BLD AUTO: 527 CELLS/UL (ref 200–950)
MONOCYTES NFR BLD AUTO: 6.5 %
NEUTROPHILS # BLD AUTO: 2843 CELLS/UL (ref 1500–7800)
NEUTROPHILS NFR BLD AUTO: 35.1 %
NITRITE UR QL STRIP: NEGATIVE
NONHDLC SERPL-MCNC: 124 MG/DL (CALC)
PH UR STRIP: <5 [PH] (ref 5–8)
PLATELET # BLD AUTO: 310 THOUSAND/UL (ref 140–400)
PMV BLD REES-ECKER: 12 FL (ref 7.5–12.5)
POTASSIUM SERPL-SCNC: 4.8 MMOL/L (ref 3.5–5.3)
PROT SERPL-MCNC: 8.2 G/DL (ref 6.1–8.1)
PROT UR QL STRIP: ABNORMAL
RBC # BLD AUTO: 4.97 MILLION/UL (ref 3.8–5.1)
RBC #/AREA URNS HPF: ABNORMAL /HPF
SERVICE CMNT-IMP: ABNORMAL
SODIUM SERPL-SCNC: 139 MMOL/L (ref 135–146)
SP GR UR STRIP: 1.02 (ref 1–1.03)
SQUAMOUS #/AREA URNS HPF: ABNORMAL /HPF
TRIGL SERPL-MCNC: 103 MG/DL
WBC # BLD AUTO: 8.1 THOUSAND/UL (ref 3.8–10.8)
WBC #/AREA URNS HPF: ABNORMAL /HPF

## 2025-05-14 ENCOUNTER — TELEPHONE (OUTPATIENT)
Dept: CARDIOLOGY | Facility: HOSPITAL | Age: 75
End: 2025-05-14

## 2025-05-14 DIAGNOSIS — E78.5 HYPERLIPIDEMIA, UNSPECIFIED HYPERLIPIDEMIA TYPE: ICD-10-CM

## 2025-05-14 RX ORDER — EZETIMIBE 10 MG/1
10 TABLET ORAL DAILY
Qty: 100 TABLET | Refills: 3 | Status: SHIPPED | OUTPATIENT
Start: 2025-05-14 | End: 2026-05-14

## 2025-05-15 ENCOUNTER — OFFICE VISIT (OUTPATIENT)
Dept: GASTROENTEROLOGY | Facility: CLINIC | Age: 75
End: 2025-05-15
Payer: MEDICARE

## 2025-05-15 VITALS
WEIGHT: 150.4 LBS | SYSTOLIC BLOOD PRESSURE: 148 MMHG | DIASTOLIC BLOOD PRESSURE: 74 MMHG | HEIGHT: 61 IN | HEART RATE: 71 BPM | TEMPERATURE: 96.9 F | BODY MASS INDEX: 28.4 KG/M2

## 2025-05-15 DIAGNOSIS — Z71.41 ALCOHOL ABUSE COUNSELING AND SURVEILLANCE: ICD-10-CM

## 2025-05-15 DIAGNOSIS — K21.9 GERD WITHOUT ESOPHAGITIS: Primary | ICD-10-CM

## 2025-05-15 DIAGNOSIS — Z86.19 HEPATITIS C VIRUS INFECTION CURED AFTER ANTIVIRAL DRUG THERAPY: ICD-10-CM

## 2025-05-15 PROCEDURE — 3078F DIAST BP <80 MM HG: CPT | Performed by: INTERNAL MEDICINE

## 2025-05-15 PROCEDURE — 99213 OFFICE O/P EST LOW 20 MIN: CPT | Performed by: INTERNAL MEDICINE

## 2025-05-15 PROCEDURE — G2211 COMPLEX E/M VISIT ADD ON: HCPCS | Performed by: INTERNAL MEDICINE

## 2025-05-15 PROCEDURE — 1159F MED LIST DOCD IN RCRD: CPT | Performed by: INTERNAL MEDICINE

## 2025-05-15 PROCEDURE — 3077F SYST BP >= 140 MM HG: CPT | Performed by: INTERNAL MEDICINE

## 2025-05-15 PROCEDURE — 1126F AMNT PAIN NOTED NONE PRSNT: CPT | Performed by: INTERNAL MEDICINE

## 2025-05-15 PROCEDURE — 3008F BODY MASS INDEX DOCD: CPT | Performed by: INTERNAL MEDICINE

## 2025-05-15 ASSESSMENT — PAIN SCALES - GENERAL: PAINLEVEL_OUTOF10: 0-NO PAIN

## 2025-05-15 NOTE — PATIENT INSTRUCTIONS
Welcome to Dr[facility].  Dr. Yeh sees patients at the following sites:  [place] [address] [facility] at [facility]  [address], [address] at [facility] at [facility], [facility] [address]    Dr. Yeh's hepatology care coordinator, Ama HUERTA, can be reached at [phone].  Dr. Yeh's , Toshia John, can be reached at [phone].    Continue Omeprazole for the next month.    When your insurance changes, we can try a newer acid medication for your GERD - Vonoprazan. Please call the office after you complete Omeprazole and your insurance changes.     We discussed colonoscopy - which you have chosen to defer.    Return in 1 year.  Call around September/October to schedule your annual visit for May 2026.

## 2025-05-15 NOTE — LETTER
May 16, 2025     Maru Daniel, GISELA-CNP  [address] [place], [facility] [address]  [facility] [address]    Patient: Taylor Richard   YOB: 1950   Date of Visit: 5/15/2025       Dear Dr. Maru Daniel, GISELA-CNP:    Thank you for referring Taylor Richard to me for evaluation. Below are my notes for this consultation.  If you have questions, please do not hesitate to call me. I look forward to following your patient along with you.       Sincerely,     Seth Yeh MD      CC: No Recipients  ______________________________________________________________________________________    Subjective    Hepatology clinic follow up appointment.     History of Present Illness:   Taylor Richard is a 74 y.o. female who presents to GI/Liver clinic for follow up of HCV infection (cured), GERD, dyspepsia.    At her last visit:  Recently developed some right sided abdominal pain that she describes as follows: radiating pain towards her flank and right mid back. It comes and goes. Began a few days ago. Intensity: high intensity that lasts for few seconds then subsides. She believes movement and torque exacerbates the pain. For example when turning around to look backwards, or bending over forward. Since this office visit with me in June 2024, she had an episode of biliary colic and went the Urgent Care and then the [facility] ER on 10/7/24. She underwent a laparoscopic cholecystectomy for symptomatic cholelithiasis. Pathology was consistent with chronic cholecystitis.     Alcohol use: white wine - 2 glasses per day. Drinks out of boredom. She continues to drink wine on a regular basis and then reports her concerns for liver disease at our appointments. Her labwork is grossly normal. We have done imaging and FIbroScan in the past - consistent with mild liver disease.    She said her GERD recently has been bothering her. She restated taking omeprazole earlier this week.     She does report that she will be  "changing her health insurance next month.     Summary:     In 2009, a liver biopsy demonstrated stage 1-2 fibrosis. She was initially treated with peg IFN and RBV and was a non-responder. In 2014, she was retreated with Harvoni and had a sustained response. She has been cured from her HCV infection. Follow-up Fibroscan last year with low LSM c/w very mild liver disease.     There has been no h/o hepatic decompensation.     In the past she had reported some moderate alcohol consumption - I have advised her in the past to stop alcohol and minimize consumption. She continues to drink fairly regularly, \"out of boredom.\"     The past few visits she has had a number of upper GI symptoms including epigastric discomfort and an occasional RUQ pain. She did a RUQ and liver ultrasound. She then had an upper endoscopy.   She has issues with bloating and distension.   H2 breath test for SIBO (9/2023): negative.     She has had severe heartburn - better on omeprazole, under control.    Does drink white wine - a pint every day or two.      Review of Systems  Review of Systems   All other systems reviewed and are negative.      Past Medical History   has a past medical history of Acute candidiasis of vulva and vagina (01/06/2017), Age-related nuclear cataract, left eye (05/21/2018), Age-related nuclear cataract, right eye (01/10/2018), Cellulitis of left lower limb (05/27/2021), Cholelithiasis, Chronic viral hepatitis C (Multi) (08/14/2017), Combined forms of age-related cataract, bilateral (12/13/2017), Contusion of right eyelid and periocular area, initial encounter (01/10/2018), Coronary artery disease, Cortical age-related cataract, left eye (05/21/2018), Cortical age-related cataract, right eye (01/10/2018), Edema of larynx (12/30/2016), Emphysema lung (Multi), Encounter for immunization (05/26/2021), GERD (gastroesophageal reflux disease), Hepatitis C, Hypertensive retinopathy, bilateral (12/19/2022), Meibomian gland " dysfunction of unspecified eye, unspecified eyelid (12/19/2022), Myopia, bilateral (12/19/2022), Ocular hypertension, unspecified eye (01/15/2016), Ocular pain, right eye (01/10/2018), Other conditions influencing health status (05/21/2018), Other pulmonary collapse (06/28/2016), Other specified noninflammatory disorders of vulva and perineum (12/15/2014), Pain in right shoulder (04/06/2016), Pain in unspecified ankle and joints of unspecified foot (10/27/2015), Personal history of nicotine dependence (01/04/2023), Personal history of other diseases of the digestive system (08/17/2017), Personal history of other diseases of the respiratory system (05/03/2018), Personal history of other infectious and parasitic diseases (09/15/2021), Personal history of other infectious and parasitic diseases, Personal history of other specified conditions (08/14/2017), Personal history of other specified conditions (03/27/2018), Prediabetes (12/19/2022), Preglaucoma, unspecified, bilateral (12/19/2022), Presence of intraocular lens (02/15/2018), Presence of intraocular lens (12/19/2022), Rheumatoid arthritis, Right upper quadrant pain (10/14/2021), Right upper quadrant pain (10/14/2021), Strain of unspecified muscle, fascia and tendon at shoulder and upper arm level, right arm, initial encounter (04/06/2016), Systemic lupus erythematosus, unspecified (12/19/2022), Systemic lupus erythematosus, unspecified (11/29/2017), Type 2 diabetes mellitus without complications (01/04/2023), Type 2 diabetes mellitus without complications (12/13/2017), Type 2 diabetes mellitus without complications (10/31/2017), Unspecified symptoms and signs involving the genitourinary system (09/15/2021), and Unspecified visual field defects (04/27/2016).     Social History   reports that she quit smoking about 6 years ago. Her smoking use included cigarettes. She does not have any smokeless tobacco history on file. She reports current alcohol use of about 14.0  "standard drinks of alcohol per week. She reports that she does not use drugs.     Family History  family history includes Breast cancer (age of onset: 35) in her daughter; COPD in her mother; Colon cancer in her mother; Hodgkin's lymphoma in her brother; Multiple myeloma in her sister; Prostate cancer in her father.     Allergies  Allergies   Allergen Reactions   • Acetaminophen Other     \"Didn't sit right with me\"   • Amlodipine Hives   • Colchicine Diarrhea   • Indomethacin GI Upset   • Isosorbide Hives   • Pravastatin Nausea Only and Unknown   • Codeine Unknown and Rash   • Losartan Rash   • Penicillins Unknown and Rash       Medications  Current Outpatient Medications   Medication Instructions   • albuterol 90 mcg/actuation inhaler 2 puffs, inhalation, Every 4 hours PRN   • aspirin 81 mg, Daily   • azelastine (Optivar) 0.05 % ophthalmic solution 1 drop both eyes 2 times a day as needed for itching/allergies   • clobetasol (Temovate) 0.05 % ointment Topical, 2 times daily   • ezetimibe (ZETIA) 10 mg, oral, Daily   • ferrous gluconate (Fergon) 324 (38 Fe) mg tablet Take 1 tablet by mouth with breakfast 3 times per week   • latanoprost (Xalatan) 0.005 % ophthalmic solution INSTILL 1 DROP INTO BOTH EYES  ONCE DAILY AT BEDTIME   • metFORMIN (Glucophage) 500 mg tablet TAKE 2 TABLETS BY MOUTH IN THE  MORNING AND 1 TABLET IN THE  EVENING   • nitroglycerin (NITROSTAT) 0.4 mg, sublingual, Every 5 min PRN, Max 3 tablets in 15 minutes. Call 911 if pain persists   • omeprazole OTC (PRILOSEC OTC) 20 mg, Daily before breakfast   • OneTouch Ultra Test strip Use to check blood sugar 3 times daily.        Objective  Visit Vitals  /74   Pulse 71   Temp 36.1 °C (96.9 °F) (Temporal)          11/25/2024     9:59 AM 12/13/2024     9:29 AM 3/7/2025    10:34 AM 3/24/2025     2:04 PM 5/5/2025    10:28 AM 5/6/2025     1:26 PM 5/15/2025    10:38 AM   Vitals   Systolic 138 132 130 151 132 134 148   Diastolic 70 80 62 82 75 62 74   BP " "Location Left arm    Left arm     Heart Rate  80 95 89  75 71   Temp       36.1 °C (96.9 °F)   Height  1.549 m (5' 1\") 1.549 m (5' 1\") 1.549 m (5' 1\") 1.549 m (5' 1\") 1.549 m (5' 1\") 1.549 m (5' 1\")   Weight (lb)  148.6 153 148 147 149 150.4   BMI  28.08 kg/m2 28.91 kg/m2 27.96 kg/m2 27.78 kg/m2 28.15 kg/m2 28.42 kg/m2   BSA (m2)  1.7 m2 1.73 m2 1.7 m2 1.69 m2 1.71 m2 1.71 m2   Visit Report Report Report Report Report Report    Report Report Report     Physical Exam  Vitals reviewed.   Constitutional:       General: She is awake.      Appearance: Normal appearance.   HENT:      Head: Normocephalic and atraumatic.      Nose: Nose normal.      Mouth/Throat:      Mouth: Mucous membranes are moist.   Eyes:      Pupils: Pupils are equal, round, and reactive to light.   Cardiovascular:      Rate and Rhythm: Normal rate.   Pulmonary:      Effort: Pulmonary effort is normal.   Neurological:      Mental Status: She is alert and oriented to person, place, and time. Mental status is at baseline.   Psychiatric:         Attention and Perception: Attention and perception normal.         Mood and Affect: Mood normal.         Behavior: Behavior normal.         Labs    WHITE BLOOD CELL COUNT   Date/Time Value Ref Range Status   05/09/2025 09:57 AM 8.1 3.8 - 10.8 Thousand/uL Final     HEMOGLOBIN   Date/Time Value Ref Range Status   05/09/2025 09:57 AM 12.4 11.7 - 15.5 g/dL Final     HEMATOCRIT   Date/Time Value Ref Range Status   05/09/2025 09:57 AM 38.1 35.0 - 45.0 % Final     MCV   Date/Time Value Ref Range Status   05/09/2025 09:57 AM 76.7 (L) 80.0 - 100.0 fL Final     PLATELET COUNT   Date/Time Value Ref Range Status   05/09/2025 09:57  140 - 400 Thousand/uL Final        PROTEIN, TOTAL   Date/Time Value Ref Range Status   05/09/2025 09:57 AM 8.2 (H) 6.1 - 8.1 g/dL Final     ALBUMIN   Date/Time Value Ref Range Status   05/09/2025 09:57 AM 4.6 3.6 - 5.1 g/dL Final     AST   Date/Time Value Ref Range Status   05/09/2025 09:57 " AM 13 10 - 35 U/L Final     ALT   Date/Time Value Ref Range Status   05/09/2025 09:57 AM 7 6 - 29 U/L Final     ALKALINE PHOSPHATASE   Date/Time Value Ref Range Status   05/09/2025 09:57 AM 91 37 - 153 U/L Final     BILIRUBIN, TOTAL   Date/Time Value Ref Range Status   05/09/2025 09:57 AM 0.3 0.2 - 1.2 mg/dL Final     BILIRUBIN, DIRECT   Date/Time Value Ref Range Status   02/06/2025 11:39 AM 0.1 < OR = 0.2 mg/dL Final        Vitamin D, 25-Hydroxy, Total   Date/Time Value Ref Range Status   07/25/2024 09:19 AM 77 30 - 100 ng/mL Final        AST   Date/Time Value Ref Range Status   05/09/2025 09:57 AM 13 10 - 35 U/L Final     ALT   Date/Time Value Ref Range Status   05/09/2025 09:57 AM 7 6 - 29 U/L Final     ALKALINE PHOSPHATASE   Date/Time Value Ref Range Status   05/09/2025 09:57 AM 91 37 - 153 U/L Final     BILIRUBIN, TOTAL   Date/Time Value Ref Range Status   05/09/2025 09:57 AM 0.3 0.2 - 1.2 mg/dL Final     BILIRUBIN, DIRECT   Date/Time Value Ref Range Status   02/06/2025 11:39 AM 0.1 < OR = 0.2 mg/dL Final     ALBUMIN   Date/Time Value Ref Range Status   05/09/2025 09:57 AM 4.6 3.6 - 5.1 g/dL Final     PROTEIN, TOTAL   Date/Time Value Ref Range Status   05/09/2025 09:57 AM 8.2 (H) 6.1 - 8.1 g/dL Final        Protime   Date/Time Value Ref Range Status   10/06/2024 03:53 PM 11.3 9.8 - 12.8 seconds Final     INR   Date/Time Value Ref Range Status   10/06/2024 03:53 PM 1.0 0.9 - 1.1 Final     Imaging  10/2021  Ultrasound:  LIVER:  The liver measures 18.1 cm and is grossly unremarkable and free of  any focal lesions.     GALLBLADDER:  The gallbladder is nondistended, and demonstrates no evidence of  wall thickening or surrounding fluid. There is a shadowing echogenic  focus consistent with a gallstone measuring 2.5 cm x 1.9 cm x 1.0 cm.  Sonographic Barker's sign is negative.     BILIARY SYSTEM:  No evidence of intra or extrahepatic biliary dilatation is  identified; the common bile duct measures 4-5 mm.      PANCREAS:  The pancreas is poorly visualized due to overlying bowel gas.     RIGHT KIDNEY:  The right kidney measures 11.5 cm in length. No hydronephrosis or  renal calculi are seen.     PERITONEUM AND RETROPERITONEUM:  There is no free or loculated fluid seen in the abdomen.     IMPRESSION:  Cholelithiasis without sonographic evidence of acute cholecystitis.  No biliary dilatation identified.    Assessment/Plan  Taylor Richard is a 74 y.o. female who presents to GI/LIver clinic for GERD, and non ulcer dyspepsia. She has a history of HCV infection, treated and cured.    Impression:  Chronic HCV infection, treated with Harvoni with a sustained response. She has been cured from her HCV infection many years ago and has mild liver fibrosis.     She continue to have episodic and regular alcohol consumption. I counselled her regarding harms of alcohol abuse especially given her prior HCV infection.     She does have a history of epigastric discmofort, heartburn, abdominal bloating and distension.  Recently, she underwent a cholecystectomy for symptomatic gallstone disease and was found to have chronic cholecystitis. Perhaps this was the reason for symptoms in the past.      Plan:    She will continue her PPI. I offered a trial of Vonoprazan to treat her chronic GERD. We can try next patricia with her new insurance.   I have recommended 1 year follow up.     As before:  Zofran tablets for nausea: not taking and discontinued today.     omeprazole 20 mg daily - will continue, controlled.     We discussed avoiding eating in the late afternoon.     Hydrogen breath test for SIBO : neg (9/23)     Discussed alcohol consumption - provided counseling.    Instructions      Seth Yeh MD

## 2025-05-15 NOTE — PROGRESS NOTES
"Subjective     Hepatology clinic follow up appointment.     History of Present Illness:   Taylor Richard is a 74 y.o. female who presents to GI/Liver clinic for follow up of HCV infection (cured), GERD, dyspepsia.    At her last visit:  Recently developed some right sided abdominal pain that she describes as follows: radiating pain towards her flank and right mid back. It comes and goes. Began a few days ago. Intensity: high intensity that lasts for few seconds then subsides. She believes movement and torque exacerbates the pain. For example when turning around to look backwards, or bending over forward. Since this office visit with me in June 2024, she had an episode of biliary colic and went the Urgent Care and then the [facility] ER on 10/7/24. She underwent a laparoscopic cholecystectomy for symptomatic cholelithiasis. Pathology was consistent with chronic cholecystitis.     Alcohol use: white wine - 2 glasses per day. Drinks out of boredom. She continues to drink wine on a regular basis and then reports her concerns for liver disease at our appointments. Her labwork is grossly normal. We have done imaging and FIbroScan in the past - consistent with mild liver disease.    She said her GERD recently has been bothering her. She restated taking omeprazole earlier this week.     She does report that she will be changing her health insurance next month.     Summary:     In 2009, a liver biopsy demonstrated stage 1-2 fibrosis. She was initially treated with peg IFN and RBV and was a non-responder. In 2014, she was retreated with Harvoni and had a sustained response. She has been cured from her HCV infection. Follow-up Fibroscan last year with low LSM c/w very mild liver disease.     There has been no h/o hepatic decompensation.     In the past she had reported some moderate alcohol consumption - I have advised her in the past to stop alcohol and minimize consumption. She continues to drink fairly regularly, \"out of " "boredom.\"     The past few visits she has had a number of upper GI symptoms including epigastric discomfort and an occasional RUQ pain. She did a RUQ and liver ultrasound. She then had an upper endoscopy.   She has issues with bloating and distension.   H2 breath test for SIBO (9/2023): negative.     She has had severe heartburn - better on omeprazole, under control.    Does drink white wine - a pint every day or two.      Review of Systems  Review of Systems   All other systems reviewed and are negative.      Past Medical History   has a past medical history of Acute candidiasis of vulva and vagina (01/06/2017), Age-related nuclear cataract, left eye (05/21/2018), Age-related nuclear cataract, right eye (01/10/2018), Cellulitis of left lower limb (05/27/2021), Cholelithiasis, Chronic viral hepatitis C (Multi) (08/14/2017), Combined forms of age-related cataract, bilateral (12/13/2017), Contusion of right eyelid and periocular area, initial encounter (01/10/2018), Coronary artery disease, Cortical age-related cataract, left eye (05/21/2018), Cortical age-related cataract, right eye (01/10/2018), Edema of larynx (12/30/2016), Emphysema lung (Multi), Encounter for immunization (05/26/2021), GERD (gastroesophageal reflux disease), Hepatitis C, Hypertensive retinopathy, bilateral (12/19/2022), Meibomian gland dysfunction of unspecified eye, unspecified eyelid (12/19/2022), Myopia, bilateral (12/19/2022), Ocular hypertension, unspecified eye (01/15/2016), Ocular pain, right eye (01/10/2018), Other conditions influencing health status (05/21/2018), Other pulmonary collapse (06/28/2016), Other specified noninflammatory disorders of vulva and perineum (12/15/2014), Pain in right shoulder (04/06/2016), Pain in unspecified ankle and joints of unspecified foot (10/27/2015), Personal history of nicotine dependence (01/04/2023), Personal history of other diseases of the digestive system (08/17/2017), Personal history of other " "diseases of the respiratory system (05/03/2018), Personal history of other infectious and parasitic diseases (09/15/2021), Personal history of other infectious and parasitic diseases, Personal history of other specified conditions (08/14/2017), Personal history of other specified conditions (03/27/2018), Prediabetes (12/19/2022), Preglaucoma, unspecified, bilateral (12/19/2022), Presence of intraocular lens (02/15/2018), Presence of intraocular lens (12/19/2022), Rheumatoid arthritis, Right upper quadrant pain (10/14/2021), Right upper quadrant pain (10/14/2021), Strain of unspecified muscle, fascia and tendon at shoulder and upper arm level, right arm, initial encounter (04/06/2016), Systemic lupus erythematosus, unspecified (12/19/2022), Systemic lupus erythematosus, unspecified (11/29/2017), Type 2 diabetes mellitus without complications (01/04/2023), Type 2 diabetes mellitus without complications (12/13/2017), Type 2 diabetes mellitus without complications (10/31/2017), Unspecified symptoms and signs involving the genitourinary system (09/15/2021), and Unspecified visual field defects (04/27/2016).     Social History   reports that she quit smoking about 6 years ago. Her smoking use included cigarettes. She does not have any smokeless tobacco history on file. She reports current alcohol use of about 14.0 standard drinks of alcohol per week. She reports that she does not use drugs.     Family History  family history includes Breast cancer (age of onset: 35) in her daughter; COPD in her mother; Colon cancer in her mother; Hodgkin's lymphoma in her brother; Multiple myeloma in her sister; Prostate cancer in her father.     Allergies  Allergies   Allergen Reactions    Acetaminophen Other     \"Didn't sit right with me\"    Amlodipine Hives    Colchicine Diarrhea    Indomethacin GI Upset    Isosorbide Hives    Pravastatin Nausea Only and Unknown    Codeine Unknown and Rash    Losartan Rash    Penicillins Unknown and " "Rash       Medications  Current Outpatient Medications   Medication Instructions    albuterol 90 mcg/actuation inhaler 2 puffs, inhalation, Every 4 hours PRN    aspirin 81 mg, Daily    azelastine (Optivar) 0.05 % ophthalmic solution 1 drop both eyes 2 times a day as needed for itching/allergies    clobetasol (Temovate) 0.05 % ointment Topical, 2 times daily    ezetimibe (ZETIA) 10 mg, oral, Daily    ferrous gluconate (Fergon) 324 (38 Fe) mg tablet Take 1 tablet by mouth with breakfast 3 times per week    latanoprost (Xalatan) 0.005 % ophthalmic solution INSTILL 1 DROP INTO BOTH EYES  ONCE DAILY AT BEDTIME    metFORMIN (Glucophage) 500 mg tablet TAKE 2 TABLETS BY MOUTH IN THE  MORNING AND 1 TABLET IN THE  EVENING    nitroglycerin (NITROSTAT) 0.4 mg, sublingual, Every 5 min PRN, Max 3 tablets in 15 minutes. Call 911 if pain persists    omeprazole OTC (PRILOSEC OTC) 20 mg, Daily before breakfast    OneTouch Ultra Test strip Use to check blood sugar 3 times daily.        Objective   Visit Vitals  /74   Pulse 71   Temp 36.1 °C (96.9 °F) (Temporal)          11/25/2024     9:59 AM 12/13/2024     9:29 AM 3/7/2025    10:34 AM 3/24/2025     2:04 PM 5/5/2025    10:28 AM 5/6/2025     1:26 PM 5/15/2025    10:38 AM   Vitals   Systolic 138 132 130 151 132 134 148   Diastolic 70 80 62 82 75 62 74   BP Location Left arm    Left arm     Heart Rate  80 95 89  75 71   Temp       36.1 °C (96.9 °F)   Height  1.549 m (5' 1\") 1.549 m (5' 1\") 1.549 m (5' 1\") 1.549 m (5' 1\") 1.549 m (5' 1\") 1.549 m (5' 1\")   Weight (lb)  148.6 153 148 147 149 150.4   BMI  28.08 kg/m2 28.91 kg/m2 27.96 kg/m2 27.78 kg/m2 28.15 kg/m2 28.42 kg/m2   BSA (m2)  1.7 m2 1.73 m2 1.7 m2 1.69 m2 1.71 m2 1.71 m2   Visit Report Report Report Report Report Report    Report Report Report     Physical Exam  Vitals reviewed.   Constitutional:       General: She is awake.      Appearance: Normal appearance.   HENT:      Head: Normocephalic and atraumatic.      Nose: Nose " normal.      Mouth/Throat:      Mouth: Mucous membranes are moist.   Eyes:      Pupils: Pupils are equal, round, and reactive to light.   Cardiovascular:      Rate and Rhythm: Normal rate.   Pulmonary:      Effort: Pulmonary effort is normal.   Neurological:      Mental Status: She is alert and oriented to person, place, and time. Mental status is at baseline.   Psychiatric:         Attention and Perception: Attention and perception normal.         Mood and Affect: Mood normal.         Behavior: Behavior normal.         Labs    WHITE BLOOD CELL COUNT   Date/Time Value Ref Range Status   05/09/2025 09:57 AM 8.1 3.8 - 10.8 Thousand/uL Final     HEMOGLOBIN   Date/Time Value Ref Range Status   05/09/2025 09:57 AM 12.4 11.7 - 15.5 g/dL Final     HEMATOCRIT   Date/Time Value Ref Range Status   05/09/2025 09:57 AM 38.1 35.0 - 45.0 % Final     MCV   Date/Time Value Ref Range Status   05/09/2025 09:57 AM 76.7 (L) 80.0 - 100.0 fL Final     PLATELET COUNT   Date/Time Value Ref Range Status   05/09/2025 09:57  140 - 400 Thousand/uL Final        PROTEIN, TOTAL   Date/Time Value Ref Range Status   05/09/2025 09:57 AM 8.2 (H) 6.1 - 8.1 g/dL Final     ALBUMIN   Date/Time Value Ref Range Status   05/09/2025 09:57 AM 4.6 3.6 - 5.1 g/dL Final     AST   Date/Time Value Ref Range Status   05/09/2025 09:57 AM 13 10 - 35 U/L Final     ALT   Date/Time Value Ref Range Status   05/09/2025 09:57 AM 7 6 - 29 U/L Final     ALKALINE PHOSPHATASE   Date/Time Value Ref Range Status   05/09/2025 09:57 AM 91 37 - 153 U/L Final     BILIRUBIN, TOTAL   Date/Time Value Ref Range Status   05/09/2025 09:57 AM 0.3 0.2 - 1.2 mg/dL Final     BILIRUBIN, DIRECT   Date/Time Value Ref Range Status   02/06/2025 11:39 AM 0.1 < OR = 0.2 mg/dL Final        Vitamin D, 25-Hydroxy, Total   Date/Time Value Ref Range Status   07/25/2024 09:19 AM 77 30 - 100 ng/mL Final        AST   Date/Time Value Ref Range Status   05/09/2025 09:57 AM 13 10 - 35 U/L Final     ALT    Date/Time Value Ref Range Status   05/09/2025 09:57 AM 7 6 - 29 U/L Final     ALKALINE PHOSPHATASE   Date/Time Value Ref Range Status   05/09/2025 09:57 AM 91 37 - 153 U/L Final     BILIRUBIN, TOTAL   Date/Time Value Ref Range Status   05/09/2025 09:57 AM 0.3 0.2 - 1.2 mg/dL Final     BILIRUBIN, DIRECT   Date/Time Value Ref Range Status   02/06/2025 11:39 AM 0.1 < OR = 0.2 mg/dL Final     ALBUMIN   Date/Time Value Ref Range Status   05/09/2025 09:57 AM 4.6 3.6 - 5.1 g/dL Final     PROTEIN, TOTAL   Date/Time Value Ref Range Status   05/09/2025 09:57 AM 8.2 (H) 6.1 - 8.1 g/dL Final        Protime   Date/Time Value Ref Range Status   10/06/2024 03:53 PM 11.3 9.8 - 12.8 seconds Final     INR   Date/Time Value Ref Range Status   10/06/2024 03:53 PM 1.0 0.9 - 1.1 Final     Imaging  10/2021  Ultrasound:  LIVER:  The liver measures 18.1 cm and is grossly unremarkable and free of  any focal lesions.     GALLBLADDER:  The gallbladder is nondistended, and demonstrates no evidence of  wall thickening or surrounding fluid. There is a shadowing echogenic  focus consistent with a gallstone measuring 2.5 cm x 1.9 cm x 1.0 cm.  Sonographic Barker's sign is negative.     BILIARY SYSTEM:  No evidence of intra or extrahepatic biliary dilatation is  identified; the common bile duct measures 4-5 mm.     PANCREAS:  The pancreas is poorly visualized due to overlying bowel gas.     RIGHT KIDNEY:  The right kidney measures 11.5 cm in length. No hydronephrosis or  renal calculi are seen.     PERITONEUM AND RETROPERITONEUM:  There is no free or loculated fluid seen in the abdomen.     IMPRESSION:  Cholelithiasis without sonographic evidence of acute cholecystitis.  No biliary dilatation identified.    Assessment/Plan   Taylor Richard is a 74 y.o. female who presents to GI/LIver clinic for GERD, and non ulcer dyspepsia. She has a history of HCV infection, treated and cured.    Impression:  Chronic HCV infection, treated with Harvoni with a  sustained response. She has been cured from her HCV infection many years ago and has mild liver fibrosis.     She continue to have episodic and regular alcohol consumption. I counselled her regarding harms of alcohol abuse especially given her prior HCV infection.     She does have a history of epigastric discmofort, heartburn, abdominal bloating and distension.  Recently, she underwent a cholecystectomy for symptomatic gallstone disease and was found to have chronic cholecystitis. Perhaps this was the reason for symptoms in the past.      Plan:    She will continue her PPI. I offered a trial of Vonoprazan to treat her chronic GERD. We can try next patricia with her new insurance.   I have recommended 1 year follow up.     As before:  Zofran tablets for nausea: not taking and discontinued today.     omeprazole 20 mg daily - will continue, controlled.     We discussed avoiding eating in the late afternoon.     Hydrogen breath test for SIBO : neg (9/23)     Discussed alcohol consumption - provided counseling.    Instructions      Seth Yeh MD

## 2025-05-19 DIAGNOSIS — D64.9 ANEMIA, UNSPECIFIED TYPE: ICD-10-CM

## 2025-05-19 DIAGNOSIS — E78.5 HYPERLIPIDEMIA, UNSPECIFIED HYPERLIPIDEMIA TYPE: ICD-10-CM

## 2025-05-19 DIAGNOSIS — E11.36 TYPE 2 DIABETES MELLITUS WITH DIABETIC CATARACT, WITHOUT LONG-TERM CURRENT USE OF INSULIN: Primary | ICD-10-CM

## 2025-05-19 DIAGNOSIS — I10 BENIGN ESSENTIAL HYPERTENSION: ICD-10-CM

## 2025-06-09 ENCOUNTER — APPOINTMENT (OUTPATIENT)
Dept: PRIMARY CARE | Facility: CLINIC | Age: 75
End: 2025-06-09
Payer: MEDICARE

## 2025-06-11 ENCOUNTER — TELEPHONE (OUTPATIENT)
Dept: GASTROENTEROLOGY | Facility: HOSPITAL | Age: 75
End: 2025-06-11
Payer: MEDICARE

## 2025-06-11 NOTE — TELEPHONE ENCOUNTER
Patient called informing that her insurance has changed and she can now be prescribed the Vonotrazan that was discussed during most recent visit.

## 2025-06-16 ENCOUNTER — APPOINTMENT (OUTPATIENT)
Facility: CLINIC | Age: 75
End: 2025-06-16
Payer: MEDICARE

## 2025-06-18 DIAGNOSIS — K21.00 GASTROESOPHAGEAL REFLUX DISEASE WITH ESOPHAGITIS WITHOUT HEMORRHAGE: Primary | ICD-10-CM

## 2025-06-18 NOTE — TELEPHONE ENCOUNTER
Hepatology Nurse Coordinator Note   Called patient to let her know Dr. Yeh prescribed the vonoprazan. No answer. Left a voicemail message requesting a call back.    Use Map Statement For Sites (Optional): No

## 2025-06-19 DIAGNOSIS — K21.00 GASTROESOPHAGEAL REFLUX DISEASE WITH ESOPHAGITIS WITHOUT HEMORRHAGE: ICD-10-CM

## 2025-06-19 NOTE — TELEPHONE ENCOUNTER
Hepatology Nurse Coordinator Note   Called patient to make her aware that Dr. Yeh sent her prescription for Vonoprazan. Patient states she got a call from [facility], which is the wrong pharmacy. She is requesting for it to be sent to [facility]. She's aware Dr. Yeh will resend prescription. She verbalized understanding.    Confirmed updated Rx was sent.

## 2025-06-20 ENCOUNTER — TELEPHONE (OUTPATIENT)
Dept: GASTROENTEROLOGY | Facility: HOSPITAL | Age: 75
End: 2025-06-20
Payer: MEDICARE

## 2025-06-20 NOTE — TELEPHONE ENCOUNTER
Hepatology Nurse Coordinator Note   Patient called and left a voicemail that [facility] contacted her stating her new medication, vonoprazan, was not covered. She is inquiring if another medication can be ordered. Will forward request to Dr. Yeh for review.

## 2025-06-23 RX ORDER — CALC/MAG/B COMPLEX/D3/HERB 61
15 TABLET ORAL DAILY
Qty: 90 CAPSULE | Refills: 3 | Status: SHIPPED | OUTPATIENT
Start: 2025-06-23 | End: 2026-06-23

## 2025-06-23 NOTE — TELEPHONE ENCOUNTER
Hepatology Nurse Coordinator Note  Patient left an additional medication regarding medication not being covered. Called her back to let her know Dr. Yeh is aware of issue with medication and will provide an update today after reviewing alternatives. Patient did not answer. Left a voicemail message for patient.

## 2025-06-23 NOTE — TELEPHONE ENCOUNTER
"Hepatology Nurse Coordinator Note  Spoke with patient. She's aware Dr. Yeh discontinued Vonoprazan since it's not covered by insurance. She's aware he would like her to take Lansoprazole 15 mg daily. Patient states she can not take that. She states she has been on in the past and it causes \"uncontrollable bowel issues\" and she can't take again. She states she doesn't want to do the omeprazole due to hair loss. She's aware I will review with Dr. Yeh. She verbalized understanding.     "

## 2025-06-23 NOTE — TELEPHONE ENCOUNTER
Hepatology Nurse Coordinator Note  Reviewed plan for patient with Dr. Yeh as Vonoprazan isn't covered by insurance. Per MD, Vonoprazan discontinued. Will change to Lansoprazole 15 mg daily. Will call patient to review change in recommendations.

## 2025-06-27 NOTE — TELEPHONE ENCOUNTER
Hepatology Nurse Coordinator Note  Per Dr. Yeh, he has samples of Vonoprazan for patient to try to see if it works. If it does, then he will be able to submit a Rx to  [facility] to see if they can assist with approval. Patient is agreeable to plan. She states she can meet with Dr. Yeh on 7/10 a  [place] for samples. Will provide update to Dr. Yeh. Patient aware if any further questions to call the office back at [phone]. Reminder set to be sent to Dr. Yeh.

## 2025-07-08 ENCOUNTER — APPOINTMENT (OUTPATIENT)
Dept: PRIMARY CARE | Facility: CLINIC | Age: 75
End: 2025-07-08
Payer: MEDICARE

## 2025-07-11 ENCOUNTER — CLINICAL SUPPORT (OUTPATIENT)
Dept: AUDIOLOGY | Facility: CLINIC | Age: 75
End: 2025-07-11
Payer: MEDICARE

## 2025-07-11 DIAGNOSIS — H90.3 SENSORINEURAL HEARING LOSS (SNHL) OF BOTH EARS: Primary | ICD-10-CM

## 2025-07-11 PROCEDURE — 92557 COMPREHENSIVE HEARING TEST: CPT | Performed by: AUDIOLOGIST

## 2025-07-11 PROCEDURE — 92567 TYMPANOMETRY: CPT | Performed by: AUDIOLOGIST

## 2025-07-11 ASSESSMENT — PAIN - FUNCTIONAL ASSESSMENT: PAIN_FUNCTIONAL_ASSESSMENT: 0-10

## 2025-07-11 ASSESSMENT — PAIN SCALES - GENERAL: PAINLEVEL_OUTOF10: 0 - NO PAIN

## 2025-07-11 NOTE — PROGRESS NOTES
AUDIOLOGY ADULT AUDIOMETRIC EVALUATION      Name:  Taylor Richard  :  1950  Age:  74 y.o.  Date of Evaluation: 25  Time: 8651-8703    History:    Reason for visit:  Ms. Taylor Richard was seen today because she has concerns that her hearing has worsened.  Patient reports worsening hearing bilaterally since her last visit in 2024. She also reports occasional ear pain in her right ear. She is interested in hearing aids and requested information on places that may accept her insurance.  Her last hearing test was in 2024 and revealed mild hearing loss at 125-1000 Hz sloping to severe sensorineural hearing loss bilaterally.    Denied any aural fullness, tinnitus, ear pressure, dizziness/vertigo, recent ear/sinus infections, significant noise exposure, or sudden hearing loss.    EVALUATION    See Audiogram    RESULTS:    Otoscopic Evaluation:   Right Ear: Otoscopy revealed a clear healthy canal and a healthy tympanic membrane was visualized.   Left Ear:  Otoscopy revealed a clear healthy canal and a healthy tympanic membrane was visualized.     Immittance:  Immittance Measures: 226 Hz  Right Ear: Tympanometric testing  type A tympanogram with normal middle ear pressure and normal static compliance.  Left Ear: Tympanometric testing  type A tympanogram with normal middle ear pressure and normal static compliance.     Test technique:  Standard Audiometry via Transducer: Insert earphones    Reliability:   good    Pure Tone Audiometry:    Right Ear: Audiometric testing indicated normal sloping to severe sensorineural hearing loss.   Left Ear:   Audiometric testing indicated normal sloping to severe sensorineural hearing loss.       Speech Audiometry:   Right Ear:  Speech Reception Threshold (SRT) was obtained at 40 dBHL                  Word Recognition scores were good (88%) in quiet when words were presented at 90 dBHL  Left Ear:  Speech Reception Threshold (SRT) was obtained at  30 dBHL                   Word Recognition scores were excellent (100%) in quiet when words were presented at 80 dBHL  Testing was performed with recorded NU-6 speech words in quiet. Speech thresholds were in good agreement with the pure tone averages in each ear.     IMPRESSIONS:  Today's test results are consistent with normal middle ear function and  normal sloping to severe sensorineural hearing loss bilaterally.  The patient was counseled with regard to the findings.    When compared to the previous test results on 11/8/24, stable hearing thresholds were found.  Ms. Richard has also been counseled since 2023 on the fact that hearing aids are recommended.  Instructions have been written out in the past to call her insurance to see who is in network to take advantage of any hearing aid benefits.  Today instructions were written out again and the phone number to [facility] was provided.      RECOMMENDATIONS:  Follow up with PCP.  Bilateral hearing aids are recommended due to hearing loss and patient concerns for hearing difficultly.  Contact insurance to see what providers are in network for hearing aids. [facility] may be in network.   Return for annual hearing test or sooner in concerns arise.    PATIENT EDUCATION:   Discussed results and recommendations with the patient and a copy of the hearing test was provided.  Questions were addressed and the patient was encouraged to contact our department should concerns arise.    DIANA Zeng.  Audiology Extern     Under the supervision of  Joanne Lyon, CCC-A  Clinical Audiologist

## 2025-07-16 ENCOUNTER — OFFICE VISIT (OUTPATIENT)
Dept: GASTROENTEROLOGY | Facility: CLINIC | Age: 75
End: 2025-07-16
Payer: MEDICARE

## 2025-07-16 VITALS
WEIGHT: 148 LBS | HEART RATE: 77 BPM | BODY MASS INDEX: 27.94 KG/M2 | TEMPERATURE: 97.2 F | HEIGHT: 61 IN | SYSTOLIC BLOOD PRESSURE: 128 MMHG | DIASTOLIC BLOOD PRESSURE: 67 MMHG

## 2025-07-16 DIAGNOSIS — K21.9 GASTROESOPHAGEAL REFLUX DISEASE, UNSPECIFIED WHETHER ESOPHAGITIS PRESENT: Primary | ICD-10-CM

## 2025-07-16 DIAGNOSIS — K59.00 INFREQUENT BOWEL MOVEMENTS: ICD-10-CM

## 2025-07-16 DIAGNOSIS — Z80.0 FAMILY HISTORY OF COLON CANCER IN MOTHER: ICD-10-CM

## 2025-07-16 DIAGNOSIS — R14.0 ABDOMINAL BLOATING: ICD-10-CM

## 2025-07-16 DIAGNOSIS — R14.0 ABDOMINAL DISTENSION: ICD-10-CM

## 2025-07-16 PROCEDURE — 3078F DIAST BP <80 MM HG: CPT | Performed by: NURSE PRACTITIONER

## 2025-07-16 PROCEDURE — 3074F SYST BP LT 130 MM HG: CPT | Performed by: NURSE PRACTITIONER

## 2025-07-16 PROCEDURE — 99212 OFFICE O/P EST SF 10 MIN: CPT | Performed by: NURSE PRACTITIONER

## 2025-07-16 PROCEDURE — 1159F MED LIST DOCD IN RCRD: CPT | Performed by: NURSE PRACTITIONER

## 2025-07-16 PROCEDURE — 99213 OFFICE O/P EST LOW 20 MIN: CPT | Performed by: NURSE PRACTITIONER

## 2025-07-16 PROCEDURE — 1126F AMNT PAIN NOTED NONE PRSNT: CPT | Performed by: NURSE PRACTITIONER

## 2025-07-16 PROCEDURE — 3008F BODY MASS INDEX DOCD: CPT | Performed by: NURSE PRACTITIONER

## 2025-07-16 PROCEDURE — 1160F RVW MEDS BY RX/DR IN RCRD: CPT | Performed by: NURSE PRACTITIONER

## 2025-07-16 ASSESSMENT — ENCOUNTER SYMPTOMS
WEAKNESS: 0
ARTHRALGIAS: 0
NERVOUS/ANXIOUS: 0
SHORTNESS OF BREATH: 0
NUMBNESS: 0
LIGHT-HEADEDNESS: 0
COUGH: 0
FATIGUE: 0
MYALGIAS: 0
DIAPHORESIS: 0
PHOTOPHOBIA: 0
WHEEZING: 0
EYE PAIN: 0
AGITATION: 0
ADENOPATHY: 0
PALPITATIONS: 0
FLANK PAIN: 0
JOINT SWELLING: 0
DIZZINESS: 0
SORE THROAT: 0
FEVER: 0
HALLUCINATIONS: 0
HEADACHES: 0
DYSURIA: 0
CHILLS: 0
BACK PAIN: 0
FREQUENCY: 0
HEMATURIA: 0

## 2025-07-16 ASSESSMENT — PAIN SCALES - GENERAL: PAINLEVEL_OUTOF10: 0-NO PAIN

## 2025-07-16 NOTE — PROGRESS NOTES
Subjective   Patient ID: Taylor Richard is a 74 y.o. female who presents for Follow-up (Follow up).    This is a 74 year old AAF with history of CAD s/p NISHA to OM1 (2/2022 ), HTN, type II DM, GERD, hepatitis C s/p treatment (cured), and glaucoma who is presenting to the GI clinic for follow up. Last seen in the GI clinic on 7/5/24.     History per patient and review of EMR     She did not complete the screening colonoscopy I previously ordered. She is not interested in doing a colonoscopy.      Interval history:     In 10/2024 she was hospitalized at [facility] for acute cholecystitis for which she underwent laparoscopic cholecystectomy.     Reports hair loss which she believes is secondary to omeprazole use.  She has since stopped using omeprazole and her hair is growing back.    Reports a decrease in bowel movement frequency since her cholecystectomy. She has a BM every 2-3 days since her cholecystectomy.  Stool are softer since cholecystectomy.  Does not feel constipated per se.    Heartburn has improved with Voquezna as per Dr. Yeh with  hepatology.  Denies any food triggers for heartburn.    Reports abdominal bloating and abdominal distension.     Denies abdominal pain, diarrhea, hematemesis, hematochezia, and melena.     Cooks with garlic and onions. Eats a variety of beans including lima beans, kidney beans and black eyed peas. She admits that her diet overall does not include a lot of fiber, though.    Review of Systems   Constitutional:  Negative for chills, diaphoresis, fatigue and fever.   HENT:  Negative for congestion, ear pain, hearing loss, sneezing and sore throat.    Eyes:  Negative for photophobia, pain and visual disturbance.   Respiratory:  Negative for cough, shortness of breath and wheezing.    Cardiovascular:  Negative for chest pain, palpitations and leg swelling.   Endocrine: Negative for cold intolerance and heat intolerance.   Genitourinary:  Negative for dysuria, flank pain,  "frequency and hematuria.   Musculoskeletal:  Negative for arthralgias, back pain, gait problem, joint swelling and myalgias.   Skin:  Negative for rash.   Neurological:  Negative for dizziness, syncope, weakness, light-headedness, numbness and headaches.   Hematological:  Negative for adenopathy.   Psychiatric/Behavioral:  Negative for agitation and hallucinations. The patient is not nervous/anxious.        Allergies[1]    Current Medications[2]     Objective     Lab Results   Component Value Date    WBC 8.1 05/09/2025    HGB 12.4 05/09/2025    HCT 38.1 05/09/2025    MCV 76.7 (L) 05/09/2025     05/09/2025      Lab Results   Component Value Date    CREATININE 0.80 05/09/2025    BUN 16 05/09/2025     05/09/2025    K 4.8 05/09/2025     05/09/2025    CO2 25 05/09/2025      Lab Results   Component Value Date    ALT 7 05/09/2025    AST 13 05/09/2025    ALKPHOS 91 05/09/2025    BILITOT 0.3 05/09/2025       /67   Pulse 77   Temp 36.2 °C (97.2 °F) (Temporal)   Ht (!) 1.549 m (5' 1\")   Wt 67.1 kg (148 lb)   BMI 27.96 kg/m²     Physical Exam  Constitutional:       General: She is not in acute distress.     Appearance: Normal appearance.   HENT:      Head: Normocephalic and atraumatic.     Eyes:      Conjunctiva/sclera: Conjunctivae normal.       Cardiovascular:      Rate and Rhythm: Normal rate and regular rhythm.      Heart sounds: No murmur heard.     No gallop.   Pulmonary:      Effort: Pulmonary effort is normal.      Breath sounds: Normal breath sounds.   Abdominal:      General: Bowel sounds are normal. There is no distension.      Tenderness: There is no abdominal tenderness. There is no guarding.     Musculoskeletal:         General: No swelling or deformity. Normal range of motion.      Cervical back: Normal range of motion. No rigidity.     Skin:     General: Skin is warm and dry.      Coloration: Skin is not jaundiced.      Findings: No lesion or rash.     Neurological:      General: No " "focal deficit present.      Mental Status: She is alert and oriented to person, place, and time.     Psychiatric:         Mood and Affect: Mood normal.         Assessment/Plan   Problem List Items Addressed This Visit       GERD (gastroesophageal reflux disease) - Primary     Other Visit Diagnoses         Abdominal bloating          Abdominal distension          Family history of colon cancer in mother          Infrequent bowel movements               1.  Infrequent bowel movements:  - Recommend a daily fiber supplement such as Metamucil or Benefiber (or the generic equivalent)    2.  Abdominal bloating, abdominal distension: Most likely functional in nature.  - Advise trial of the low FODMAP diet; dietary reading materials once again provided per patient request   - Advise trial of OTC IBgard     3.  GERD: Improved with Voquenza    4. Family history of colon cancer in [place] (mother):  - Strongly recommend screening colonoscopy as ordered, but she declined at this time     5.  Follow-up:  - Patient plans to follow-up as needed           [1]   Allergies  Allergen Reactions    Acetaminophen Other     \"Didn't sit right with me\"    Amlodipine Hives    Colchicine Diarrhea    Indomethacin GI Upset    Isosorbide Hives    Pravastatin Nausea Only and Unknown    Codeine Unknown and Rash    Losartan Rash    Penicillins Unknown and Rash   [2]   Current Outpatient Medications   Medication Sig Dispense Refill    albuterol 90 mcg/actuation inhaler INHALE 2 INHALATIONS BY MOUTH  EVERY 4 HOURS IF NEEDED FOR  WHEEZING OR SHORTNESS OF BREATH 40.2 g 2    aspirin 81 mg EC tablet Take 1 tablet (81 mg) by mouth once daily.      azelastine (Optivar) 0.05 % ophthalmic solution 1 drop both eyes 2 times a day as needed for itching/allergies 6 mL 6    clobetasol (Temovate) 0.05 % ointment Apply topically 2 times a day. 45 g 1    ezetimibe (Zetia) 10 mg tablet Take 1 tablet (10 mg) by mouth once daily. 100 tablet 3    latanoprost (Xalatan) 0.005 " % ophthalmic solution INSTILL 1 DROP INTO BOTH EYES  ONCE DAILY AT BEDTIME 10 mL 3    metFORMIN (Glucophage) 500 mg tablet TAKE 2 TABLETS BY MOUTH IN THE  MORNING AND 1 TABLET IN THE  EVENING 300 tablet 3    nitroglycerin (Nitrostat) 0.4 mg SL tablet DISSOLVE 1 TABLET UNDER THE  TONGUE EVERY 5 MINUTES AS NEEDED FOR CHEST PAIN. MAX OF 3 TABLETS IN 15 MINUTES. CALL 911 IF PAIN  PERSISTS. 100 tablet 3    OneTouch Ultra Test strip Use to check blood sugar 3 times daily. 300 strip 2     No current facility-administered medications for this visit.

## 2025-07-16 NOTE — PATIENT INSTRUCTIONS
Thanks for coming to the GI clinic.     Try the low FODMAP diet.     Try OTC IBgard.     I recommend a daily fiber supplement such as Metamucil or Benefiber (or the generic equivalent) to help you have more regular bowel movements.     Follow up as needed.

## 2025-07-17 ENCOUNTER — HOSPITAL ENCOUNTER (OUTPATIENT)
Dept: RADIOLOGY | Facility: HOSPITAL | Age: 75
Discharge: HOME | End: 2025-07-17
Payer: MEDICARE

## 2025-07-17 DIAGNOSIS — R91.8 LUNG NODULES: ICD-10-CM

## 2025-07-17 PROCEDURE — 71250 CT THORAX DX C-: CPT

## 2025-07-24 ENCOUNTER — APPOINTMENT (OUTPATIENT)
Dept: GASTROENTEROLOGY | Facility: CLINIC | Age: 75
End: 2025-07-24
Payer: MEDICARE

## 2025-07-26 ASSESSMENT — CUP TO DISC RATIO
OS_RATIO: 0.25
OD_RATIO: 0.25

## 2025-07-26 NOTE — PROGRESS NOTES
Glaucoma suspect of both eyesH40.003  -On latanoprost OU QHS - started Jan 2016  -Pachymetry (10/31/17) - 510/520  -OCT RNFL (8/1/25) - SS: 7/10 OD and 6/10 OS. OD: Thin S. Bord T/I. OS: Thin S/T, bord I. 63/63. Stable from 8/14/23.   -HVF 24-2 (2/12/24) - OD: 13/14FL 3%FP. Scattered nasal, but WNL. OS: 5/16FL 1%FN. Inferonasal depression. Similar to 5/21/18.   -Patient had stopped latanoprost 9/2022 due to foreign body sensation, burning, irritation. Had been taking latanoprost OU since 2016 and not experienced symptoms before. Recommended changing to timolol 0.5% OU qAM, but patient declined to take due to potential side effects (cardiac). Restarted latanoprost OU QHS 12/2022 - continue.  -Plan: Risk of permanent vision loss with diagnosis of glaucoma - recommend good compliance with drops and office visits.  Patient using latanoprost OU nightly without issue - reports good compliance. F/u 6 months for IOP check and HVF 24-2.     Allergic conjunctivitis, bilateral  Meibomian gland dysfunction (MGD)H02.89  Blepharitis of both eyesH01.003  -FBS, irritation, redness, itching OS>OD  -History of eyelids and eyebrows itchy like there are mites in them - likely some dryness and blepharitis - history of using erythromycin ophthalmic ointment.  -Sleeps on side, R>L  -Continue warm compresses 1-2x/day  -Baby shampoo lid wash daily or iVizia wipes (samples given) - feels that these don't help  -Using artificial tears once a day, advised to increase to 3-4x/day  -Will Rx: Azelastine OU BID prn itching/allergies (tried but doesn't seem like it's helping). Eyes are itchy mainly in the fall - possible allergy component.   -Can consider Xdemvy as needed, or Restasis, Cequa, Vevye, Xiidra, Miebo, Tyrvaya for keratoconjunctivitis sicca. Verkazia for vernal conjunctivitis. Punctal plugs.   -Patient would like to try prescription medication for dry eye. Will Rx: Xiidra OU BID.   -F/u 6 months for IOP check and HVF 24-2.      Pseudophakia of left eyeZ96.1 - 11/21/24 - Complex with Trypan Blue  -Doing well. Good vision. IOP good. Off all gtts.   -Continue artificial tears PRN  -F/u 6 months for IOP check and HVF 24-2.     PVD (posterior vitreous detachment), right eyeH43.811  -Stable, continue to monitor.     Pseudophakia of right eyeZ96.1 - 12/7/17  -Doing well. Good vision. IOP good.     Pre-[id number]  Hypertensive retinopathy of both eyesH35.033  -OCT macula (9/23/24) - SS: 4/10 OD and 4/10 OS. Normal thickness and contour OU. Intact IS-OS OU. No edema OU. 255/247. Stable from 12/19/22.   -HbA1c= 6.2 (7/30/25). On Metformin. No diabetic retinopathy seen. Continue close monitoring of blood glucose, blood pressure, and cholesterol. Plan for annual dilated eye exam. Advised smoking cessation.     Myopia of both eyes with astigmatism and caiwzilvbxX23.13  -Rx given and confirmation signature obtained 2/7/25  -F/u 6 months for IOP check and HVF 24-2.   -New Rx given per patient request.         No history of refractive surgery.

## 2025-07-28 ENCOUNTER — APPOINTMENT (OUTPATIENT)
Dept: PRIMARY CARE | Facility: CLINIC | Age: 75
End: 2025-07-28
Payer: MEDICARE

## 2025-07-28 VITALS
WEIGHT: 149 LBS | DIASTOLIC BLOOD PRESSURE: 64 MMHG | SYSTOLIC BLOOD PRESSURE: 136 MMHG | HEIGHT: 61 IN | OXYGEN SATURATION: 96 % | BODY MASS INDEX: 28.13 KG/M2 | HEART RATE: 72 BPM

## 2025-07-28 DIAGNOSIS — R91.8 GROUND GLASS OPACITY PRESENT ON IMAGING OF LUNG: ICD-10-CM

## 2025-07-28 DIAGNOSIS — E11.9 TYPE 2 DIABETES MELLITUS WITHOUT COMPLICATION, WITHOUT LONG-TERM CURRENT USE OF INSULIN: ICD-10-CM

## 2025-07-28 DIAGNOSIS — Z00.00 MEDICARE ANNUAL WELLNESS VISIT, SUBSEQUENT: Primary | ICD-10-CM

## 2025-07-28 DIAGNOSIS — E11.36 TYPE 2 DIABETES MELLITUS WITH DIABETIC CATARACT, WITHOUT LONG-TERM CURRENT USE OF INSULIN: ICD-10-CM

## 2025-07-28 DIAGNOSIS — Z78.0 ENCOUNTER FOR OSTEOPOROSIS SCREENING IN ASYMPTOMATIC POSTMENOPAUSAL PATIENT: ICD-10-CM

## 2025-07-28 DIAGNOSIS — J43.9 PULMONARY EMPHYSEMA, UNSPECIFIED EMPHYSEMA TYPE (MULTI): ICD-10-CM

## 2025-07-28 DIAGNOSIS — R91.8 LUNG NODULES: ICD-10-CM

## 2025-07-28 DIAGNOSIS — Z00.00 HEALTHCARE MAINTENANCE: ICD-10-CM

## 2025-07-28 DIAGNOSIS — Z13.820 ENCOUNTER FOR OSTEOPOROSIS SCREENING IN ASYMPTOMATIC POSTMENOPAUSAL PATIENT: ICD-10-CM

## 2025-07-28 DIAGNOSIS — F10.10 NONDEPENDENT ALCOHOL ABUSE, EPISODIC DRINKING BEHAVIOR: ICD-10-CM

## 2025-07-28 DIAGNOSIS — R09.89 PULMONARY AIR TRAPPING: ICD-10-CM

## 2025-07-28 DIAGNOSIS — Z87.891 FORMER SMOKER: ICD-10-CM

## 2025-07-28 PROCEDURE — 1160F RVW MEDS BY RX/DR IN RCRD: CPT

## 2025-07-28 PROCEDURE — 3078F DIAST BP <80 MM HG: CPT

## 2025-07-28 PROCEDURE — 99397 PER PM REEVAL EST PAT 65+ YR: CPT

## 2025-07-28 PROCEDURE — 1159F MED LIST DOCD IN RCRD: CPT

## 2025-07-28 PROCEDURE — 3075F SYST BP GE 130 - 139MM HG: CPT

## 2025-07-28 PROCEDURE — 1170F FXNL STATUS ASSESSED: CPT

## 2025-07-28 PROCEDURE — G0439 PPPS, SUBSEQ VISIT: HCPCS

## 2025-07-28 PROCEDURE — 3008F BODY MASS INDEX DOCD: CPT

## 2025-07-28 RX ORDER — LANCETS 26 GAUGE
EACH MISCELLANEOUS
Qty: 100 EACH | Refills: 3 | Status: SHIPPED | OUTPATIENT
Start: 2025-07-28 | End: 2026-07-28

## 2025-07-28 RX ORDER — DEXTROSE 4 G
TABLET,CHEWABLE ORAL
Qty: 1 EACH | Refills: 0 | Status: SHIPPED | OUTPATIENT
Start: 2025-07-28

## 2025-07-28 RX ORDER — METFORMIN HYDROCHLORIDE 500 MG/1
TABLET ORAL
Qty: 300 TABLET | Refills: 3 | Status: SHIPPED | OUTPATIENT
Start: 2025-07-28

## 2025-07-28 ASSESSMENT — ACTIVITIES OF DAILY LIVING (ADL)
MANAGING_FINANCES: INDEPENDENT
DRESSING: INDEPENDENT
DOING_HOUSEWORK: INDEPENDENT
BATHING: INDEPENDENT
GROCERY_SHOPPING: INDEPENDENT
TAKING_MEDICATION: INDEPENDENT

## 2025-07-28 ASSESSMENT — PATIENT HEALTH QUESTIONNAIRE - PHQ9
1. LITTLE INTEREST OR PLEASURE IN DOING THINGS: NOT AT ALL
SUM OF ALL RESPONSES TO PHQ9 QUESTIONS 1 AND 2: 0
SUM OF ALL RESPONSES TO PHQ9 QUESTIONS 1 AND 2: 0
2. FEELING DOWN, DEPRESSED OR HOPELESS: NOT AT ALL
2. FEELING DOWN, DEPRESSED OR HOPELESS: NOT AT ALL
SUM OF ALL RESPONSES TO PHQ9 QUESTIONS 1 AND 2: 0
1. LITTLE INTEREST OR PLEASURE IN DOING THINGS: NOT AT ALL
1. LITTLE INTEREST OR PLEASURE IN DOING THINGS: NOT AT ALL
2. FEELING DOWN, DEPRESSED OR HOPELESS: NOT AT ALL

## 2025-07-28 ASSESSMENT — ENCOUNTER SYMPTOMS
LOSS OF SENSATION IN FEET: 0
OCCASIONAL FEELINGS OF UNSTEADINESS: 0
DEPRESSION: 0

## 2025-07-28 NOTE — PROGRESS NOTES
Primary Care Provider: MIGUEL ANGEL Joseph    Chief Complaint: Medicare Wellness Exam/Comprehensive Problem Focused Follow Up and Physical Exam    HPI:    MWE & CPE    DM II  On metformin 500mg TAKE 2 TABLETS BY MOUTH IN THE MORNING AND 1 TABLET IN THE EVENING   Check B12 def  Lab Results   Component Value Date    HGBA1C 6.0 (H) 05/09/2025       HM:  CT lung CA screening with lung noduels, ground glass opacity, and air trapping/ pulmonary emphysema- 3 month repeat scan due Oct 2025; Former smoker ;  Last PFT 5/19: no obst, no rest, mild red DLCO  Pulmonary referral placed & PFT ordered; no current inhaler use - consider starting on Anoro Ellipta inhaler    Alcohol use: has been alcohol free for 7 days now, doing well; check vit def to due alcohol use hx and mild anemia on blood work    Colonoscopy due- encouraged her to reschedule     Healthcare POA- Maria Elena Wade - niece    Declines shingles vaccine due to swelling she developed in her hand    DEXA scan- due- ordered    Mammogram- refuses    Well-balanced diet      Active Problem List  Problem List[1]    Comprehensive Medical/Surgical/Social/Family History  Medical History[2]  Surgical History[3]  Social History[4]  Family History[5]      Allergies and Medications  Acetaminophen, Amlodipine, Colchicine, Indomethacin, Isosorbide, Pravastatin, Codeine, Losartan, and Penicillins  Medications Ordered Prior to Encounter[6]    Medications and Supplements  prescribed by me and other practitioners or clinical pharmacist (such as prescriptions, OTC's, herbal therapies and supplements) were reviewed and documented in the medical record.    Tobacco/Alcohol/Opioid use, as well as Illicit Drug Use was screened for/reviewed and documented in Social History section and medication list as appropriate       Advance directives  Advanced Care Planning (including a Living Will, Healthcare POA, as well as specific end of life choices and/or directives), was discussed with  "the patient and/or surrogate, voluntarily, and documented in the medical record.     ROS otherwise negative aside from what was mentioned above in HPI.    Vitals  /64   Pulse 72   Ht (!) 1.549 m (5' 1\")   Wt 67.6 kg (149 lb)   SpO2 96%   BMI 28.15 kg/m²   Body mass index is 28.15 kg/m².  Physical Exam  Gen: Alert, NAD  HEENT:  PERRLA, EOMI, conjunctiva and sclera normal in appearance. External auditory canals/TMs normal; Oral cavity and posterior pharynx without lesions/exudate  Neck:  Supple with FROM; No masses/nodes palpable; Thyroid nontender and without nodules; No YOHAN  Respiratory:  Lungs CTAB  Cardiovascular:  Heart RRR. No M/R/G. Peripheral pulses equal bilaterally  Abdomen:  Soft, nontender, BS present throughout; No R/G/R; No HSM or masses palpated  Extremities:  FROM all extremities; Muscle strength grossly normal with good tone  Neuro:  CN II-XII intact; Reflexes 2+/2+; Gross motor and sensory intact  Skin:  warm, dry    Assessment and Plan:  Problem List Items Addressed This Visit    MWE & CPE    DM II  On metformin 500mg TAKE 2 TABLETS BY MOUTH IN THE MORNING AND 1 TABLET IN THE EVENING   Check B12 def  Lab Results   Component Value Date    HGBA1C 6.0 (H) 05/09/2025       HM:  CT lung CA screening with lung noduels, ground glass opacity, and air trapping/ pulmonary emphysema- 3 month repeat scan due Oct 2025; Former smoker ;  Last PFT 5/19: no obst, no rest, mild red DLCO  Pulmonary referral placed & PFT ordered; no current inhaler use - consider starting on Anoro Ellipta inhaler    Alcohol use: has been alcohol free for 7 days now, doing well; check vit def to due alcohol use hx and mild anemia on blood work    Colonoscopy due- encouraged her to reschedule     Healthcare POA- Mauro,Maria Elena - niece    Declines shingles vaccine due to swelling she developed in her hand    DEXA scan- due- ordered    Mammogram- refuses           Diabetes mellitus (Multi)    Stable  On metformin 500mg TAKE 2 " TABLETS BY MOUTH IN THE MORNING AND 1 TABLET IN THE EVENING   Check B12 def  Lab Results   Component Value Date    HGBA1C 6.0 (H) 05/09/2025     Relevant Medications    Autolet lancing device    isopropyl alcohoL 70 % towelette    blood-glucose meter misc    Other Relevant Orders    CBC and Auto Differential    Vitamin B12    Vitamin B6    Hemoglobin A1C    Nondependent alcohol abuse, episodic drinking behavior    Improving  No alcohol for the last 7 days; feeling well  check vit def to due alcohol use hx and mild anemia on blood work  Relevant Orders    CBC and Auto Differential    Folate    Vitamin B12    Vitamin B6    Pulmonary emphysema (Multi)    Persisting  CT lung CA screening with lung noduels, ground glass opacity, and air trapping/ pulmonary emphysema- 3 month repeat scan due Oct 2025; Former smoker ;  Last PFT 5/19: no obst, no rest, mild red DLCO  Pulmonary referral placed & PFT ordered; no current inhaler use - consider starting on Anoro Ellipta inhaler  Relevant Orders    Referral to Pulmonology    Complete Pulmonary Function Test (Spirometry/DLCO/Lung Volumes)     Other Visit Diagnoses         Medicare annual wellness visit, subsequent    -  Primary      Healthcare maintenance          Ground glass opacity present on imaging of lung        new  CT lung CA screening with lung noduels, ground glass opacity, and air trapping/ pulmonary emphysema- 3 month repeat scan due Oct 2025; Former smoker ;  Last PFT 5/19: no obst, no rest, mild red DLCO  Pulmonary referral placed & PFT ordered; no current inhaler use - consider starting on Anoro Ellipta inhaler  Relevant Orders    Referral to Pulmonology    Complete Pulmonary Function Test (Spirometry/DLCO/Lung Volumes)      Lung nodules        Persisting  CT lung CA screening with lung noduels, ground glass opacity, and air trapping/ pulmonary emphysema- 3 month repeat scan due Oct 2025; Former smoker ;  Last PFT 5/19: no obst, no rest, mild red DLCO  Pulmonary  referral placed & PFT ordered; no current inhaler use - consider starting on Anoro Ellipta inhaler  Relevant Orders    Referral to Pulmonology    Complete Pulmonary Function Test (Spirometry/DLCO/Lung Volumes)      Former smoker        In remission  CT lung CA screening with lung noduels, ground glass opacity, and air trapping/ pulmonary emphysema- 3 month repeat scan due Oct 2025; Former smoker ;  Last PFT 5/19: no obst, no rest, mild red DLCO  Pulmonary referral placed & PFT ordered; no current inhaler use - consider starting on Anoro Ellipta inhaler  Relevant Orders    Complete Pulmonary Function Test (Spirometry/DLCO/Lung Volumes)      Pulmonary air trapping        Persisting  Persisting  CT lung CA screening with lung noduels, ground glass opacity, and air trapping/ pulmonary emphysema- 3 month repeat scan due Oct 2025; Former smoker ;  Last PFT 5/19: no obst, no rest, mild red DLCO  Pulmonary referral placed & PFT ordered; no current inhaler use - consider starting on Anoro Ellipta inhaler  Relevant Orders    Complete Pulmonary Function Test (Spirometry/DLCO/Lung Volumes)      Encounter for osteoporosis screening in asymptomatic postmenopausal patient        Relevant Orders    XR DEXA bone density                During the course of the visit the patient was educated and counseled about age appropriate screening and preventive services. Completed preventive screenings were documented in the chart and orders were placed for outstanding screenings/procedures as documented in the Assessment and Plan.      Patient Instructions (the written plan) was given to the patient at check out.    Follow up in 6 months or sooner if needed      MIGUEL ANGEL Joseph         [1]   Patient Active Problem List  Diagnosis    Allergic rhinitis    Antiphospholipid syndrome (Multi)    Atherosclerosis of aorta    Benign essential hypertension    Bilateral myopia    Binocular visual disturbance    CAD S/P percutaneous coronary  angioplasty    Blepharitis of both eyes    Cervicalgia    Chronic liver disease    Combined form of age-related cataract, left eye    Degenerative arthritis of lumbar spine    Dry eye    Dysphonia    GERD (gastroesophageal reflux disease)    Hepatitis C virus infection cured after antiviral drug therapy    HLD (hyperlipidemia)    Nuclear sclerosis    Numbness and tingling of both feet    POAG (primary open-angle glaucoma)    Postmenopausal atrophic vaginitis    Postmenopausal bone loss    Presbyopia    PVD (posterior vitreous detachment), right eye    Rheumatoid arthritis involving multiple sites with positive rheumatoid factor (Multi)    Lumbar pain    Adhesive capsulitis of shoulder    Anemia    Chest pain    Cholelithiasis    Diabetes mellitus (Multi)    Eustachian tube dysfunction    Elevated serum creatinine    Globus sensation    Hair thinning    Hearing loss sensory, bilateral    Left foot pain    Osteoarthritis    Right hip pain    Sensation of cold in lower extremity    Spasm of back muscles    Constipation    Arteriosclerosis of coronary artery    Arthralgia of ankle    Blepharitis    Cold extremities    Disorder of lung    Edema of glottis    Hypertensive retinopathy    Meibomian gland dysfunction    Myopia    Nondependent alcohol abuse, episodic drinking behavior    Nuclear senile cataract    Prediabetes    Glaucoma suspect of both eyes    Pseudophakia    Astigmatism of both eyes    Pulmonary emphysema (Multi)    Nasal discharge    Symptomatic cholelithiasis    Encounter to establish care    Type 2 diabetes mellitus with diabetic cataract, without long-term current use of insulin    Torticollis    Allergic conjunctivitis of both eyes    Stress incontinence of urine   [2]   Past Medical History:  Diagnosis Date    Acute candidiasis of vulva and vagina 01/06/2017    Vaginal candida    Age-related nuclear cataract, left eye 05/21/2018    Age-related nuclear cataract, left    Age-related nuclear cataract,  right eye 01/10/2018    Age-related nuclear cataract, right    Anemia Dont know    Cellulitis of left lower limb 05/27/2021    Cellulitis of left lower extremity    Cholelithiasis     Chronic viral hepatitis C (Multi) 08/14/2017    Chronic viral hepatitis C    Combined forms of age-related cataract, bilateral 12/13/2017    Combined form of age-related cataract, both eyes    Contusion of right eyelid and periocular area, initial encounter 01/10/2018    Periorbital ecchymosis of right eye    Coronary artery disease Three years ago    Cortical age-related cataract, left eye 05/21/2018    Cortical age-related cataract of left eye    Cortical age-related cataract, right eye 01/10/2018    Cortical age-related cataract of right eye    Edema of larynx 12/30/2016    Vocal cord edema    Emphysema lung (Multi)     Encounter for immunization 05/26/2021    Encounter for immunization    GERD (gastroesophageal reflux disease) Years ago    Hepatitis C     Hypertension Years ago    Hypertensive retinopathy, bilateral 12/19/2022    Hypertensive retinopathy of both eyes    Meibomian gland dysfunction of unspecified eye, unspecified eyelid 12/19/2022    Meibomian gland dysfunction (MGD)    Myopia, bilateral 12/19/2022    Myopia of both eyes with astigmatism and presbyopia    Ocular hypertension, unspecified eye 01/15/2016    Elevated IOP    Ocular pain, right eye 01/10/2018    Pain of right orbit    Other conditions influencing health status 05/21/2018    History of cough    Other pulmonary collapse 06/28/2016    Collapsed lung    Other specified noninflammatory disorders of vulva and perineum 12/15/2014    Vulvar lesion    Pain in right shoulder 04/06/2016    Right shoulder pain    Pain in unspecified ankle and joints of unspecified foot 10/27/2015    Ankle joint pain    Personal history of nicotine dependence 01/04/2023    Former cigarette smoker    Personal history of other diseases of the digestive system 08/17/2017    History of  cholelithiasis    Personal history of other diseases of the respiratory system 05/03/2018    History of acute bronchitis    Personal history of other infectious and parasitic diseases 09/15/2021    History of candidiasis of vagina    Personal history of other infectious and parasitic diseases     History of hepatitis    Personal history of other specified conditions 08/14/2017    History of nausea    Personal history of other specified conditions 03/27/2018    History of abdominal pain    Prediabetes 12/19/2022    Pre-diabetes    Preglaucoma, unspecified, bilateral 12/19/2022    Glaucoma suspect of both eyes    Presence of intraocular lens 02/15/2018    Bilateral pseudophakia    Presence of intraocular lens 12/19/2022    Pseudophakia of right eye    Rheumatoid arthritis     Right upper quadrant pain 10/14/2021    Right upper quadrant pain    Right upper quadrant pain 10/14/2021    RUQ pain    Strain of unspecified muscle, fascia and tendon at shoulder and upper arm level, right arm, initial encounter 04/06/2016    Right shoulder strain    Systemic lupus erythematosus, unspecified 12/19/2022    Lupus (systemic lupus erythematosus)    Systemic lupus erythematosus, unspecified 11/29/2017    History of systemic lupus erythematosus (SLE)    Type 2 diabetes mellitus without complications 01/04/2023    Diabetes mellitus    Type 2 diabetes mellitus without complications 12/13/2017    Controlled diabetes mellitus type II without complication    Type 2 diabetes mellitus without complications 10/31/2017    Controlled diabetes mellitus type II without complication    Unspecified symptoms and signs involving the genitourinary system 09/15/2021    UTI symptoms    Unspecified visual field defects 04/27/2016    Visual field defect   [3]   Past Surgical History:  Procedure Laterality Date    CATARACT EXTRACTION W/  INTRAOCULAR LENS IMPLANT Right 12/07/2017    Dr. Kaur    CATARACT EXTRACTION W/  INTRAOCULAR LENS IMPLANT Left  2024    Dr. Kaur, Aim: -2.50sph    CHOLECYSTECTOMY      10/7/24    CT ANGIO CORONARY ART WITH HEARTFLOW IF SCORE >30%  2019    CT HEART CORONARY ANGIOGRAM 2019 [facility]    OTHER SURGICAL HISTORY  10/14/2021    Complete colonoscopy    OTHER SURGICAL HISTORY  10/14/2021    Endoscopy    OTHER SURGICAL HISTORY  10/14/2021    Oral surgery    OTHER SURGICAL HISTORY  2022    Cardiac catheterization with stent placement    TOTAL ABDOMINAL HYSTERECTOMY W/ BILATERAL SALPINGOOPHORECTOMY  2014    Total Abdominal Hysterectomy With Removal Of Both Ovaries   [4]   Social History  Tobacco Use    Smoking status: Former     Current packs/day: 0.00     Types: Cigarettes     Quit date:      Years since quittin.5    Smokeless tobacco: Never    Tobacco comments:     No history of anesthesia reactions. No family history of MH.     Had cholecystectomy on 10/07/24 and is recovered.     Does not get SOB with a flight of stairs. HARD STICK!     Does not use a cane, walker, or wheelchair.     Is able to lie flat for 30 minutes.     Vaping Use    Vaping status: Never Used   Substance Use Topics    Alcohol use: Yes     Alcohol/week: 0.0 standard drinks of alcohol     Comment: about abottle of wine per day    Drug use: Not Currently   [5]   Family History  Problem Relation Name Age of Onset    COPD Mother      Colon cancer Mother      Prostate cancer Father      Multiple myeloma Sister      Hodgkin's lymphoma Brother      Breast cancer Daughter  35    Cancer Father Mr. Alvarez     Cancer Brother Fredi     Cancer Sister Lucrecia     Cancer Brother Sea     Cancer Mother Ragini    [6]   Current Outpatient Medications on File Prior to Visit   Medication Sig Dispense Refill    albuterol 90 mcg/actuation inhaler INHALE 2 INHALATIONS BY MOUTH  EVERY 4 HOURS IF NEEDED FOR  WHEEZING OR SHORTNESS OF BREATH 40.2 g 2    aspirin 81 mg EC tablet Take 1 tablet (81 mg) by mouth once daily.      azelastine (Optivar)  0.05 % ophthalmic solution 1 drop both eyes 2 times a day as needed for itching/allergies 6 mL 6    clobetasol (Temovate) 0.05 % ointment Apply topically 2 times a day. 45 g 1    ezetimibe (Zetia) 10 mg tablet Take 1 tablet (10 mg) by mouth once daily. 100 tablet 3    latanoprost (Xalatan) 0.005 % ophthalmic solution INSTILL 1 DROP INTO BOTH EYES  ONCE DAILY AT BEDTIME 10 mL 3    nitroglycerin (Nitrostat) 0.4 mg SL tablet DISSOLVE 1 TABLET UNDER THE  TONGUE EVERY 5 MINUTES AS NEEDED FOR CHEST PAIN. MAX OF 3 TABLETS IN 15 MINUTES. CALL 911 IF PAIN  PERSISTS. 100 tablet 3    OneTouch Ultra Test strip Use to check blood sugar 3 times daily. 300 strip 2    [DISCONTINUED] metFORMIN (Glucophage) 500 mg tablet TAKE 2 TABLETS BY MOUTH IN THE  MORNING AND 1 TABLET IN THE  EVENING 300 tablet 3     No current facility-administered medications on file prior to visit.

## 2025-07-31 ENCOUNTER — TELEPHONE (OUTPATIENT)
Dept: PRIMARY CARE | Facility: CLINIC | Age: 75
End: 2025-07-31
Payer: MEDICARE

## 2025-07-31 DIAGNOSIS — E11.9 TYPE 2 DIABETES MELLITUS WITHOUT COMPLICATION, WITHOUT LONG-TERM CURRENT USE OF INSULIN: Primary | ICD-10-CM

## 2025-07-31 LAB
BASOPHILS # BLD AUTO: 72 CELLS/UL (ref 0–200)
BASOPHILS NFR BLD AUTO: 1 %
EOSINOPHIL # BLD AUTO: 295 CELLS/UL (ref 15–500)
EOSINOPHIL NFR BLD AUTO: 4.1 %
ERYTHROCYTE [DISTWIDTH] IN BLOOD BY AUTOMATED COUNT: 15.6 % (ref 11–15)
EST. AVERAGE GLUCOSE BLD GHB EST-MCNC: 131 MG/DL
EST. AVERAGE GLUCOSE BLD GHB EST-SCNC: 7.3 MMOL/L
FOLATE SERPL-MCNC: 9.2 NG/ML
HBA1C MFR BLD: 6.2 %
HCT VFR BLD AUTO: 36.6 % (ref 35–45)
HGB BLD-MCNC: 11.9 G/DL (ref 11.7–15.5)
LYMPHOCYTES # BLD AUTO: 3679 CELLS/UL (ref 850–3900)
LYMPHOCYTES NFR BLD AUTO: 51.1 %
MCH RBC QN AUTO: 25.4 PG (ref 27–33)
MCHC RBC AUTO-ENTMCNC: 32.5 G/DL (ref 32–36)
MCV RBC AUTO: 78.2 FL (ref 80–100)
MONOCYTES # BLD AUTO: 526 CELLS/UL (ref 200–950)
MONOCYTES NFR BLD AUTO: 7.3 %
NEUTROPHILS # BLD AUTO: 2628 CELLS/UL (ref 1500–7800)
NEUTROPHILS NFR BLD AUTO: 36.5 %
PLATELET # BLD AUTO: 259 THOUSAND/UL (ref 140–400)
PMV BLD REES-ECKER: 10.9 FL (ref 7.5–12.5)
PYRIDOXAL PHOS SERPL-MCNC: NORMAL UG/L
RBC # BLD AUTO: 4.68 MILLION/UL (ref 3.8–5.1)
VIT B12 SERPL-MCNC: 281 PG/ML (ref 200–1100)
WBC # BLD AUTO: 7.2 THOUSAND/UL (ref 3.8–10.8)

## 2025-07-31 RX ORDER — BLOOD-GLUCOSE SENSOR
EACH MISCELLANEOUS
Qty: 2 EACH | Refills: 3 | Status: CANCELLED | OUTPATIENT
Start: 2025-07-31

## 2025-07-31 NOTE — TELEPHONE ENCOUNTER
PT called in and stated that she specifically told KYRIE Daniel that she did not want lancets to stick her fingers. PT also stated that her insurance pays for the Freestyle Lanette and that is what she asked for please advise patient is upset

## 2025-08-01 ENCOUNTER — APPOINTMENT (OUTPATIENT)
Dept: OPHTHALMOLOGY | Facility: CLINIC | Age: 75
End: 2025-08-01
Payer: MEDICARE

## 2025-08-01 DIAGNOSIS — H52.203 ASTIGMATISM OF BOTH EYES, UNSPECIFIED TYPE: ICD-10-CM

## 2025-08-01 DIAGNOSIS — H02.884 MEIBOMIAN GLAND DYSFUNCTION (MGD) OF UPPER EYELIDS OF BOTH EYES: ICD-10-CM

## 2025-08-01 DIAGNOSIS — H35.033 HYPERTENSIVE RETINOPATHY OF BOTH EYES: ICD-10-CM

## 2025-08-01 DIAGNOSIS — H10.13 ALLERGIC CONJUNCTIVITIS OF BOTH EYES: ICD-10-CM

## 2025-08-01 DIAGNOSIS — Z96.1 PSEUDOPHAKIA: ICD-10-CM

## 2025-08-01 DIAGNOSIS — H40.003 GLAUCOMA SUSPECT OF BOTH EYES: Primary | ICD-10-CM

## 2025-08-01 DIAGNOSIS — H01.004 BLEPHARITIS OF UPPER EYELIDS OF BOTH EYES, UNSPECIFIED TYPE: ICD-10-CM

## 2025-08-01 DIAGNOSIS — H02.881 MEIBOMIAN GLAND DYSFUNCTION (MGD) OF UPPER EYELIDS OF BOTH EYES: ICD-10-CM

## 2025-08-01 DIAGNOSIS — H52.4 PRESBYOPIA: ICD-10-CM

## 2025-08-01 DIAGNOSIS — H01.001 BLEPHARITIS OF UPPER EYELIDS OF BOTH EYES, UNSPECIFIED TYPE: ICD-10-CM

## 2025-08-01 DIAGNOSIS — R73.03 PREDIABETES: ICD-10-CM

## 2025-08-01 DIAGNOSIS — H52.13 MYOPIA OF BOTH EYES: ICD-10-CM

## 2025-08-01 DIAGNOSIS — H43.811 PVD (POSTERIOR VITREOUS DETACHMENT), RIGHT EYE: ICD-10-CM

## 2025-08-01 RX ORDER — LIFITEGRAST 50 MG/ML
1 SOLUTION/ DROPS OPHTHALMIC 2 TIMES DAILY
Qty: 60 EACH | Refills: 11 | Status: SHIPPED | OUTPATIENT
Start: 2025-08-01

## 2025-08-01 RX ORDER — LATANOPROST 50 UG/ML
1 SOLUTION/ DROPS OPHTHALMIC NIGHTLY
Qty: 7.5 ML | Refills: 3 | Status: SHIPPED | OUTPATIENT
Start: 2025-08-01

## 2025-08-01 RX ORDER — LANCETS
EACH MISCELLANEOUS
COMMUNITY
Start: 2025-07-30

## 2025-08-01 RX ORDER — ISOPROPYL ALCOHOL 70 ML/100ML
SWAB TOPICAL
COMMUNITY
Start: 2025-07-28

## 2025-08-01 RX ORDER — BLOOD-GLUCOSE SENSOR
EACH MISCELLANEOUS
Qty: 5 EACH | Refills: 0 | Status: SHIPPED | OUTPATIENT
Start: 2025-08-01

## 2025-08-01 RX ORDER — BLOOD-GLUCOSE,RECEIVER,CONT
EACH MISCELLANEOUS
Qty: 1 EACH | Refills: 0 | Status: SHIPPED | OUTPATIENT
Start: 2025-08-01

## 2025-08-01 ASSESSMENT — VISUAL ACUITY
OS_CC: 20/30
OD_CC: 20/20
METHOD: SNELLEN - LINEAR
CORRECTION_TYPE: GLASSES

## 2025-08-01 ASSESSMENT — ENCOUNTER SYMPTOMS
MUSCULOSKELETAL NEGATIVE: 0
CARDIOVASCULAR NEGATIVE: 0
ALLERGIC/IMMUNOLOGIC NEGATIVE: 0
CONSTITUTIONAL NEGATIVE: 0
EYES NEGATIVE: 1
RESPIRATORY NEGATIVE: 0
PSYCHIATRIC NEGATIVE: 0
NEUROLOGICAL NEGATIVE: 0
GASTROINTESTINAL NEGATIVE: 0
HEMATOLOGIC/LYMPHATIC NEGATIVE: 0
ENDOCRINE NEGATIVE: 0

## 2025-08-01 ASSESSMENT — REFRACTION_MANIFEST
OS_ADD: +2.50
OD_AXIS: 090
OD_ADD: +2.50
OD_CYLINDER: -0.50
OS_SPHERE: -3.00
OS_AXIS: 080
OS_CYLINDER: -0.50
OD_SPHERE: -2.25

## 2025-08-01 ASSESSMENT — REFRACTION_WEARINGRX
OD_CYLINDER: -0.50
OS_CYLINDER: -0.50
OS_ADD: +2.50
OD_AXIS: 090
OD_ADD: +2.50
OD_SPHERE: -2.25
OS_SPHERE: -2.50
OS_AXIS: 080

## 2025-08-01 ASSESSMENT — PACHYMETRY
OD_CT(UM): 510
OS_CT(UM): 520

## 2025-08-01 ASSESSMENT — TONOMETRY
OS_IOP_MMHG: 17
IOP_METHOD: GOLDMANN APPLANATION
OD_IOP_MMHG: 18

## 2025-08-01 NOTE — TELEPHONE ENCOUNTER
At the time of her apt we discussed how many insurances do not cover freestyle abdi unless on insulin. So I am surprised to here this. I can send the freestyle abdi now, unsure if insurance will cover.

## 2025-08-02 DIAGNOSIS — E11.9 TYPE 2 DIABETES MELLITUS WITHOUT COMPLICATION, WITHOUT LONG-TERM CURRENT USE OF INSULIN: ICD-10-CM

## 2025-08-04 ENCOUNTER — TELEPHONE (OUTPATIENT)
Dept: GASTROENTEROLOGY | Facility: HOSPITAL | Age: 75
End: 2025-08-04
Payer: MEDICARE

## 2025-08-04 NOTE — TELEPHONE ENCOUNTER
Patient called requesting a new medication for acid reflux due to the increase in price for the current medication prescribed.

## 2025-08-04 NOTE — TELEPHONE ENCOUNTER
Hepatology Nurse Coordinator Note  Returned call to patient. Patient states she tried the Vonoprazan samples and they seemed to help her symptoms. She states she can not get it; however, due to cost being over $300 a month. Patient states she needs another medication called in. She's aware I will have Dr. Yeh review. She is agreeable to have [facility] to see if they would be able to find away/assistance to get the medication filled. She's aware I will make Dr. Yeh aware. She's aware if she doesn't hear anything from  [facility] by the end of the week, to call back for an update. Patient verbalized understanding.

## 2025-08-05 DIAGNOSIS — H16.223 KERATOCONJUNCTIVITIS SICCA OF BOTH EYES NOT SPECIFIED AS SJOGREN'S: Primary | ICD-10-CM

## 2025-08-05 LAB
BASOPHILS # BLD AUTO: 72 CELLS/UL (ref 0–200)
BASOPHILS NFR BLD AUTO: 1 %
EOSINOPHIL # BLD AUTO: 295 CELLS/UL (ref 15–500)
EOSINOPHIL NFR BLD AUTO: 4.1 %
ERYTHROCYTE [DISTWIDTH] IN BLOOD BY AUTOMATED COUNT: 15.6 % (ref 11–15)
EST. AVERAGE GLUCOSE BLD GHB EST-MCNC: 131 MG/DL
EST. AVERAGE GLUCOSE BLD GHB EST-SCNC: 7.3 MMOL/L
FOLATE SERPL-MCNC: 9.2 NG/ML
HBA1C MFR BLD: 6.2 %
HCT VFR BLD AUTO: 36.6 % (ref 35–45)
HGB BLD-MCNC: 11.9 G/DL (ref 11.7–15.5)
LYMPHOCYTES # BLD AUTO: 3679 CELLS/UL (ref 850–3900)
LYMPHOCYTES NFR BLD AUTO: 51.1 %
MCH RBC QN AUTO: 25.4 PG (ref 27–33)
MCHC RBC AUTO-ENTMCNC: 32.5 G/DL (ref 32–36)
MCV RBC AUTO: 78.2 FL (ref 80–100)
MONOCYTES # BLD AUTO: 526 CELLS/UL (ref 200–950)
MONOCYTES NFR BLD AUTO: 7.3 %
NEUTROPHILS # BLD AUTO: 2628 CELLS/UL (ref 1500–7800)
NEUTROPHILS NFR BLD AUTO: 36.5 %
PLATELET # BLD AUTO: 259 THOUSAND/UL (ref 140–400)
PMV BLD REES-ECKER: 10.9 FL (ref 7.5–12.5)
PYRIDOXAL PHOS SERPL-MCNC: 7.5 NG/ML (ref 2.1–21.7)
RBC # BLD AUTO: 4.68 MILLION/UL (ref 3.8–5.1)
VIT B12 SERPL-MCNC: 281 PG/ML (ref 200–1100)
WBC # BLD AUTO: 7.2 THOUSAND/UL (ref 3.8–10.8)

## 2025-08-05 RX ORDER — BLOOD-GLUCOSE SENSOR
EACH MISCELLANEOUS
Qty: 5 EACH | Refills: 4 | Status: SHIPPED | OUTPATIENT
Start: 2025-08-05

## 2025-08-05 RX ORDER — CYCLOSPORINE 0.5 MG/ML
1 EMULSION OPHTHALMIC 2 TIMES DAILY
Qty: 60 ML | Refills: 11 | Status: SHIPPED | OUTPATIENT
Start: 2025-08-05

## 2025-08-07 ENCOUNTER — TELEPHONE (OUTPATIENT)
Dept: GASTROENTEROLOGY | Facility: HOSPITAL | Age: 75
End: 2025-08-07
Payer: MEDICARE

## 2025-08-07 NOTE — TELEPHONE ENCOUNTER
Suhas Vidales,    Ms. Mezas would like for you to put in a new order for a colonoscopy. The order that's active now expires next month and she does not want to have the colonoscopy this month.    Thanks,  Maria Luisa

## 2025-08-08 ENCOUNTER — TELEPHONE (OUTPATIENT)
Dept: OPHTHALMOLOGY | Age: 75
End: 2025-08-08
Payer: MEDICARE

## 2025-08-08 ENCOUNTER — DOCUMENTATION (OUTPATIENT)
Dept: GASTROENTEROLOGY | Facility: HOSPITAL | Age: 75
End: 2025-08-08
Payer: MEDICARE

## 2025-08-08 DIAGNOSIS — Z80.0 FAMILY HISTORY OF COLON CANCER IN MOTHER: ICD-10-CM

## 2025-08-08 DIAGNOSIS — Z80.0 FAMILY HX OF COLON CANCER REQUIRING SCREENING COLONOSCOPY: Primary | ICD-10-CM

## 2025-08-08 NOTE — TELEPHONE ENCOUNTER
Pt called, sts Restasis and Xiidra are too costly for her out of pocket cost. I explain those are the only Rx drops for dry eye. Explain that Dr. Kaur is out of the office, can check with her when she comes back on what next steps to take. Offered to make pt an apt in [facility], those apts are at our [facility] office at [facility], pt refused to go there. I explain will reach out to see if a PA would help with either medication to be covered. PT expressed verbal understanding of all information given.

## 2025-08-08 NOTE — PROGRESS NOTES
Colonoscopy with conscious sedation reordered per patient request.  Current colonoscopy order will  before patient is able to to complete colonoscopy.

## 2025-08-11 ENCOUNTER — APPOINTMENT (OUTPATIENT)
Dept: OPHTHALMOLOGY | Facility: CLINIC | Age: 75
End: 2025-08-11
Payer: MEDICARE

## 2025-08-11 ENCOUNTER — HOSPITAL ENCOUNTER (OUTPATIENT)
Dept: RESPIRATORY THERAPY | Facility: HOSPITAL | Age: 75
Discharge: HOME | End: 2025-08-11
Payer: MEDICARE

## 2025-08-11 ENCOUNTER — SPECIALTY PHARMACY (OUTPATIENT)
Dept: PHARMACY | Facility: CLINIC | Age: 75
End: 2025-08-11

## 2025-08-11 DIAGNOSIS — R91.8 GROUND GLASS OPACITY PRESENT ON IMAGING OF LUNG: ICD-10-CM

## 2025-08-11 DIAGNOSIS — R09.89 PULMONARY AIR TRAPPING: ICD-10-CM

## 2025-08-11 DIAGNOSIS — J43.9 PULMONARY EMPHYSEMA, UNSPECIFIED EMPHYSEMA TYPE (MULTI): ICD-10-CM

## 2025-08-11 DIAGNOSIS — R91.8 LUNG NODULES: ICD-10-CM

## 2025-08-11 DIAGNOSIS — Z87.891 FORMER SMOKER: ICD-10-CM

## 2025-08-11 DIAGNOSIS — K21.9 GERD WITHOUT ESOPHAGITIS: Primary | ICD-10-CM

## 2025-08-11 LAB
MGC ASCENT PFT - FEV1 - POST: 1.51
MGC ASCENT PFT - FEV1 - PRE: 1.34
MGC ASCENT PFT - FEV1 - PREDICTED: 1.82
MGC ASCENT PFT - FVC - POST: 2.11
MGC ASCENT PFT - FVC - PRE: 1.8
MGC ASCENT PFT - FVC - PREDICTED: 2.34

## 2025-08-11 PROCEDURE — 94726 PLETHYSMOGRAPHY LUNG VOLUMES: CPT | Performed by: INTERNAL MEDICINE

## 2025-08-11 PROCEDURE — 94729 DIFFUSING CAPACITY: CPT | Performed by: INTERNAL MEDICINE

## 2025-08-11 PROCEDURE — 94060 EVALUATION OF WHEEZING: CPT | Performed by: INTERNAL MEDICINE

## 2025-08-11 PROCEDURE — 94726 PLETHYSMOGRAPHY LUNG VOLUMES: CPT

## 2025-08-12 ENCOUNTER — TELEPHONE (OUTPATIENT)
Dept: PRIMARY CARE | Facility: CLINIC | Age: 75
End: 2025-08-12
Payer: MEDICARE

## 2025-08-12 DIAGNOSIS — J43.2 CENTRILOBULAR EMPHYSEMA (MULTI): ICD-10-CM

## 2025-08-12 PROBLEM — E53.8 VITAMIN B12 DEFICIENCY: Status: ACTIVE | Noted: 2025-08-12

## 2025-08-12 RX ORDER — ALBUTEROL SULFATE 90 UG/1
2 INHALANT RESPIRATORY (INHALATION) EVERY 4 HOURS PRN
Qty: 40.2 G | Refills: 3 | Status: SHIPPED | OUTPATIENT
Start: 2025-08-12

## 2025-08-18 ENCOUNTER — OFFICE VISIT (OUTPATIENT)
Dept: CARDIOLOGY | Facility: CLINIC | Age: 75
End: 2025-08-18
Payer: MEDICARE

## 2025-08-18 ENCOUNTER — TELEPHONE (OUTPATIENT)
Dept: PRIMARY CARE | Facility: CLINIC | Age: 75
End: 2025-08-18

## 2025-08-18 VITALS
BODY MASS INDEX: 27.56 KG/M2 | WEIGHT: 146 LBS | SYSTOLIC BLOOD PRESSURE: 134 MMHG | OXYGEN SATURATION: 99 % | RESPIRATION RATE: 20 BRPM | HEIGHT: 61 IN | DIASTOLIC BLOOD PRESSURE: 73 MMHG | HEART RATE: 71 BPM

## 2025-08-18 DIAGNOSIS — J43.2 CENTRILOBULAR EMPHYSEMA (MULTI): Primary | ICD-10-CM

## 2025-08-18 DIAGNOSIS — I25.10 CAD S/P PERCUTANEOUS CORONARY ANGIOPLASTY: ICD-10-CM

## 2025-08-18 DIAGNOSIS — Z98.61 CAD S/P PERCUTANEOUS CORONARY ANGIOPLASTY: ICD-10-CM

## 2025-08-18 DIAGNOSIS — R07.9 CHEST PAIN, UNSPECIFIED TYPE: ICD-10-CM

## 2025-08-18 DIAGNOSIS — E78.5 HYPERLIPIDEMIA, UNSPECIFIED HYPERLIPIDEMIA TYPE: ICD-10-CM

## 2025-08-18 PROCEDURE — 3075F SYST BP GE 130 - 139MM HG: CPT | Performed by: INTERNAL MEDICINE

## 2025-08-18 PROCEDURE — 93010 ELECTROCARDIOGRAM REPORT: CPT | Performed by: INTERNAL MEDICINE

## 2025-08-18 PROCEDURE — 93005 ELECTROCARDIOGRAM TRACING: CPT | Performed by: INTERNAL MEDICINE

## 2025-08-18 PROCEDURE — 99214 OFFICE O/P EST MOD 30 MIN: CPT | Performed by: INTERNAL MEDICINE

## 2025-08-18 PROCEDURE — 3008F BODY MASS INDEX DOCD: CPT | Performed by: INTERNAL MEDICINE

## 2025-08-18 PROCEDURE — 1159F MED LIST DOCD IN RCRD: CPT | Performed by: INTERNAL MEDICINE

## 2025-08-18 PROCEDURE — 99212 OFFICE O/P EST SF 10 MIN: CPT | Mod: 25 | Performed by: INTERNAL MEDICINE

## 2025-08-18 PROCEDURE — 3078F DIAST BP <80 MM HG: CPT | Performed by: INTERNAL MEDICINE

## 2025-08-18 RX ORDER — EZETIMIBE 10 MG/1
10 TABLET ORAL DAILY
Qty: 90 TABLET | Refills: 3 | Status: SHIPPED | OUTPATIENT
Start: 2025-08-18 | End: 2026-08-18

## 2025-08-18 RX ORDER — FLUTICASONE FUROATE AND VILANTEROL 100; 25 UG/1; UG/1
1 POWDER RESPIRATORY (INHALATION) DAILY
Qty: 60 EACH | Refills: 3 | Status: SHIPPED | OUTPATIENT
Start: 2025-08-18 | End: 2025-08-19 | Stop reason: WASHOUT

## 2025-08-18 ASSESSMENT — ENCOUNTER SYMPTOMS
ABDOMINAL PAIN: 0
VOMITING: 0
DIARRHEA: 0
OCCASIONAL FEELINGS OF UNSTEADINESS: 0
CHILLS: 0
FEVER: 0
WHEEZING: 0
NAUSEA: 0
DEPRESSION: 0
MYALGIAS: 0
HEMATURIA: 0
CONSTIPATION: 0
HEMOPTYSIS: 0
DYSURIA: 0
BLOATING: 0
MEMORY LOSS: 0
FALLS: 0
LOSS OF SENSATION IN FEET: 0
ALTERED MENTAL STATUS: 0
HEADACHES: 0
COUGH: 0

## 2025-08-19 ENCOUNTER — TELEPHONE (OUTPATIENT)
Dept: PRIMARY CARE | Facility: CLINIC | Age: 75
End: 2025-08-19
Payer: MEDICARE

## 2025-08-19 DIAGNOSIS — E11.9 TYPE 2 DIABETES MELLITUS WITHOUT COMPLICATION, WITHOUT LONG-TERM CURRENT USE OF INSULIN: ICD-10-CM

## 2025-08-19 DIAGNOSIS — J43.2 CENTRILOBULAR EMPHYSEMA (MULTI): Primary | ICD-10-CM

## 2025-08-19 DIAGNOSIS — K21.9 GASTROESOPHAGEAL REFLUX DISEASE WITHOUT ESOPHAGITIS: Primary | ICD-10-CM

## 2025-08-19 DIAGNOSIS — J43.2 CENTRILOBULAR EMPHYSEMA (MULTI): ICD-10-CM

## 2025-08-19 RX ORDER — FLUTICASONE FUROATE, UMECLIDINIUM BROMIDE AND VILANTEROL TRIFENATATE 100; 62.5; 25 UG/1; UG/1; UG/1
1 POWDER RESPIRATORY (INHALATION) DAILY
Qty: 60 EACH | Refills: 0 | Status: SHIPPED | OUTPATIENT
Start: 2025-08-19 | End: 2025-08-19

## 2025-08-19 RX ORDER — BUDESONIDE AND FORMOTEROL FUMARATE DIHYDRATE 80; 4.5 UG/1; UG/1
2 AEROSOL RESPIRATORY (INHALATION)
Qty: 30.6 G | Refills: 3 | Status: SHIPPED | OUTPATIENT
Start: 2025-08-19 | End: 2025-08-22 | Stop reason: SDUPTHER

## 2025-08-20 LAB
ATRIAL RATE: 65 BPM
P AXIS: 64 DEGREES
P OFFSET: 201 MS
P ONSET: 151 MS
PR INTERVAL: 154 MS
Q ONSET: 228 MS
QRS COUNT: 10 BEATS
QRS DURATION: 84 MS
QT INTERVAL: 406 MS
QTC CALCULATION(BAZETT): 422 MS
QTC FREDERICIA: 416 MS
R AXIS: 10 DEGREES
T AXIS: -43 DEGREES
T OFFSET: 431 MS
VENTRICULAR RATE: 65 BPM

## 2025-08-21 DIAGNOSIS — K21.9 GERD WITHOUT ESOPHAGITIS: Primary | ICD-10-CM

## 2025-08-21 RX ORDER — ESOMEPRAZOLE MAGNESIUM 40 MG/1
40 CAPSULE, DELAYED RELEASE ORAL
Qty: 30 CAPSULE | Refills: 0 | Status: SHIPPED | OUTPATIENT
Start: 2025-08-21 | End: 2025-09-20

## 2025-08-22 ENCOUNTER — TELEMEDICINE (OUTPATIENT)
Dept: PHARMACY | Facility: HOSPITAL | Age: 75
End: 2025-08-22
Payer: MEDICARE

## 2025-08-22 DIAGNOSIS — J43.2 CENTRILOBULAR EMPHYSEMA (MULTI): ICD-10-CM

## 2025-08-22 DIAGNOSIS — E11.9 TYPE 2 DIABETES MELLITUS WITHOUT COMPLICATION, WITHOUT LONG-TERM CURRENT USE OF INSULIN: ICD-10-CM

## 2025-08-22 RX ORDER — BUDESONIDE AND FORMOTEROL FUMARATE DIHYDRATE 80; 4.5 UG/1; UG/1
2 AEROSOL RESPIRATORY (INHALATION)
Qty: 30.6 G | Refills: 3 | Status: SHIPPED | OUTPATIENT
Start: 2025-08-22 | End: 2026-08-22

## 2025-08-25 ENCOUNTER — APPOINTMENT (OUTPATIENT)
Dept: CARDIOLOGY | Facility: CLINIC | Age: 75
End: 2025-08-25
Payer: MEDICARE

## 2025-08-26 ENCOUNTER — APPOINTMENT (OUTPATIENT)
Dept: CARDIOLOGY | Facility: CLINIC | Age: 75
End: 2025-08-26
Payer: MEDICARE

## 2025-08-27 DIAGNOSIS — H16.223 KERATOCONJUNCTIVITIS SICCA OF BOTH EYES NOT SPECIFIED AS SJOGREN'S: ICD-10-CM

## 2025-08-27 RX ORDER — CYCLOSPORINE 0.5 MG/ML
1 EMULSION OPHTHALMIC 2 TIMES DAILY
Qty: 60 ML | Refills: 11 | Status: SHIPPED | OUTPATIENT
Start: 2025-08-27

## 2025-08-28 ENCOUNTER — HOSPITAL ENCOUNTER (OUTPATIENT)
Dept: CARDIOLOGY | Facility: CLINIC | Age: 75
Discharge: HOME | End: 2025-08-28
Payer: MEDICARE

## 2025-08-28 DIAGNOSIS — R07.9 CHEST PAIN, UNSPECIFIED TYPE: ICD-10-CM

## 2025-08-28 DIAGNOSIS — Z98.61 CAD S/P PERCUTANEOUS CORONARY ANGIOPLASTY: ICD-10-CM

## 2025-08-28 DIAGNOSIS — I25.10 CAD S/P PERCUTANEOUS CORONARY ANGIOPLASTY: ICD-10-CM

## 2025-08-28 PROCEDURE — 93018 CV STRESS TEST I&R ONLY: CPT | Performed by: INTERNAL MEDICINE

## 2025-08-28 PROCEDURE — 93016 CV STRESS TEST SUPVJ ONLY: CPT | Performed by: INTERNAL MEDICINE

## 2025-08-28 PROCEDURE — 93017 CV STRESS TEST TRACING ONLY: CPT

## 2025-08-28 PROCEDURE — 93350 STRESS TTE ONLY: CPT | Performed by: INTERNAL MEDICINE

## 2025-09-02 ENCOUNTER — APPOINTMENT (OUTPATIENT)
Dept: DERMATOLOGY | Facility: CLINIC | Age: 75
End: 2025-09-02
Payer: MEDICARE

## 2025-09-02 ASSESSMENT — ITCH NUMERIC RATING SCALE: HOW SEVERE IS YOUR ITCHING?: 0

## 2025-09-05 ENCOUNTER — APPOINTMENT (OUTPATIENT)
Dept: PHARMACY | Facility: HOSPITAL | Age: 75
End: 2025-09-05
Payer: MEDICARE

## 2025-09-06 ENCOUNTER — DOCUMENTATION (OUTPATIENT)
Dept: HEMATOLOGY/ONCOLOGY | Facility: HOSPITAL | Age: 75
End: 2025-09-06
Payer: MEDICARE

## 2025-09-12 ENCOUNTER — APPOINTMENT (OUTPATIENT)
Dept: PHARMACY | Facility: HOSPITAL | Age: 75
End: 2025-09-12
Payer: MEDICARE

## 2025-10-23 ENCOUNTER — APPOINTMENT (OUTPATIENT)
Dept: PULMONOLOGY | Facility: CLINIC | Age: 75
End: 2025-10-23
Payer: MEDICARE

## 2025-11-12 ENCOUNTER — APPOINTMENT (OUTPATIENT)
Dept: AUDIOLOGY | Facility: CLINIC | Age: 75
End: 2025-11-12
Payer: MEDICARE

## 2025-12-11 ENCOUNTER — APPOINTMENT (OUTPATIENT)
Dept: PULMONOLOGY | Facility: CLINIC | Age: 75
End: 2025-12-11
Payer: MEDICARE

## 2026-02-02 ENCOUNTER — APPOINTMENT (OUTPATIENT)
Dept: OPHTHALMOLOGY | Facility: CLINIC | Age: 76
End: 2026-02-02
Payer: MEDICARE

## (undated) DEVICE — CLIP, LIGATING, HEM-O-LOCK, MEDIUM/LARGE, LF, GREEN

## (undated) DEVICE — SYSTEM, TROCAR LAP, 5X100MM, SHIELD BLADED, KII ADVANCED FIX ABDOMINAL

## (undated) DEVICE — PUMP, STRYKERFLOW 2 & HANDPIECE W/10FT. IRRIGATION TUBING

## (undated) DEVICE — APPLICATOR, COTTON TIP, 6 IN, 2PK, STERILE

## (undated) DEVICE — CATHETER, IV, ANGIOCATH, 14 G X 5.25 IN, FEP POLYMER

## (undated) DEVICE — TROCAR SYSTEM, BALLOON, KII GELPORT, 12 X 100MM

## (undated) DEVICE — SUTURE, VICRYL, 0, 27 IN, UR-6, VIOLET

## (undated) DEVICE — GLOVE, SURGICAL, PROTEXIS PI ORTHO, 7.0, PF, LF

## (undated) DEVICE — APPLIER, 5MM ENDOSCOPIC, HEM-O-LOCK, W/15 ML CLIPS

## (undated) DEVICE — TOWEL, SURGICAL, NEURO, O/R, 16 X 26, BLUE, STERILE

## (undated) DEVICE — TUBE SET, PNEUMOLAR HEATED, SMOKE EVACU, HIGH-FLOW

## (undated) DEVICE — CLIP, ENDO, CLINCH II, W/RATCHET, ON/OFF, CLINCH II, 5 MM

## (undated) DEVICE — Device

## (undated) DEVICE — SCOPE WARMER, LAPAROSCOPE, BAG ONLY, LF

## (undated) DEVICE — RETRIEVAL SYSTEM, MONARCH, 10MM DISP ENDOSCOPIC

## (undated) DEVICE — SOLUTION, INJECTION, USP, SODIUM CHLORIDE 0.9%, .9, NACL, 1000 ML, BAG

## (undated) DEVICE — ADHESIVE, SKIN, DERMABOND ADVANCED, 15CM, PEN-STYLE

## (undated) DEVICE — SHEAR, W/UNIPOLAR CAUTERY, ENDOSHEAR, 5 MM

## (undated) DEVICE — CANNULA, KII ADVANCED FIXATION, 5X100MM W/SEAL

## (undated) DEVICE — SOLUTION, OPHTHALMIC, TETRACAINE HCL 0.5%, 2 ML, VIAL

## (undated) DEVICE — APPLIER,  LIGACLIP, ENDO ROTATE, ROTATING, 10MM 20M/L, DISP

## (undated) DEVICE — SUTURE, MONOCRYL, 4-0, 18 IN, PS2, UNDYED

## (undated) DEVICE — CORD, MONOPOLARD, 10FT, DISP